# Patient Record
Sex: MALE | Race: WHITE | NOT HISPANIC OR LATINO | Employment: OTHER | URBAN - NONMETROPOLITAN AREA
[De-identification: names, ages, dates, MRNs, and addresses within clinical notes are randomized per-mention and may not be internally consistent; named-entity substitution may affect disease eponyms.]

---

## 2017-01-04 DIAGNOSIS — I10 BENIGN ESSENTIAL HYPERTENSION: Primary | ICD-10-CM

## 2017-01-05 RX ORDER — ATENOLOL 50 MG/1
TABLET ORAL
Qty: 90 TABLET | Refills: 2 | Status: SHIPPED | OUTPATIENT
Start: 2017-01-05 | End: 2017-07-03

## 2017-01-11 DIAGNOSIS — E78.5 HYPERLIPIDEMIA LDL GOAL <130: Primary | ICD-10-CM

## 2017-01-13 RX ORDER — PRAVASTATIN SODIUM 40 MG
TABLET ORAL
Qty: 90 TABLET | Refills: 2 | Status: SHIPPED | OUTPATIENT
Start: 2017-01-13 | End: 2017-05-12

## 2017-01-13 NOTE — TELEPHONE ENCOUNTER
Pravastatin Sodium 40mg tab     Last Written Prescription Date: 10-  Last Fill Quantity: 90, # refills: 0  Last Office Visit with [facility], P or [facility] prescribing provider: 10-   Next 5 appointments (look out 90 days)     Jan 31, 2017 11:00 AM   Return Visit with Radha Sales NP   [facility] [place] ([facility] [place] [facility])    [address] [place] [address]  [place] [address]   [phone]                   CHOL      140   10/11/2016  HDL       56   10/11/2016  LDL       61   10/11/2016  TRIG      113   10/11/2016  CHOLHDLRATIO      3.4   5/18/2015

## 2017-01-24 DIAGNOSIS — C50.911 PRIMARY BREAST MALIGNANCY, RIGHT (H): Primary | ICD-10-CM

## 2017-01-24 RX ORDER — TAMOXIFEN CITRATE 20 MG/1
20 TABLET ORAL DAILY
Qty: 90 TABLET | Refills: 3 | Status: SHIPPED | OUTPATIENT
Start: 2017-01-24 | End: 2017-02-09

## 2017-01-24 NOTE — PATIENT INSTRUCTIONS
Refill request for tamoxifen pt last seen in November, has appt to see you in Feb    Zara Hester RN

## 2017-02-01 DIAGNOSIS — K21.9 GASTROESOPHAGEAL REFLUX DISEASE, ESOPHAGITIS PRESENCE NOT SPECIFIED: Primary | ICD-10-CM

## 2017-02-01 NOTE — TELEPHONE ENCOUNTER
Omeprazole 20mg cap      Last Written Prescription Date: 10-  Last Fill Quantity: 90,  # refills: 0   Last Office Visit with G, P or [facility] prescribing provider: 10-                                         Next 5 appointments (look out 90 days)     Feb 07, 2017 11:30 AM   Return Visit with Radha Sales NP   [facility] ([facility])    [address] [place] [address] [place]  [place] [address]   [phone]

## 2017-02-03 ENCOUNTER — HOSPITAL ENCOUNTER (OUTPATIENT)
Dept: CT IMAGING | Facility: HOSPITAL | Age: 82
Discharge: HOME | End: 2017-02-03
Attending: NURSE PRACTITIONER | Admitting: NURSE PRACTITIONER
Payer: MEDICARE

## 2017-02-03 DIAGNOSIS — R73.9 ELEVATED BLOOD SUGAR: ICD-10-CM

## 2017-02-03 DIAGNOSIS — Z15.01 BRCA2 POSITIVE: ICD-10-CM

## 2017-02-03 DIAGNOSIS — Z15.09 BRCA2 POSITIVE: ICD-10-CM

## 2017-02-03 DIAGNOSIS — C50.911 PRIMARY BREAST MALIGNANCY, RIGHT (H): ICD-10-CM

## 2017-02-03 LAB
ALBUMIN SERPL-MCNC: 3.7 G/DL (ref 3.4–5)
ALP SERPL-CCNC: 57 U/L (ref 40–150)
ALT SERPL W P-5'-P-CCNC: 18 U/L (ref 0–70)
ANION GAP SERPL CALCULATED.3IONS-SCNC: 9 MMOL/L (ref 3–14)
AST SERPL W P-5'-P-CCNC: 16 U/L (ref 0–45)
BASOPHILS # BLD AUTO: 0.1 10E9/L (ref 0–0.2)
BASOPHILS NFR BLD AUTO: 0.8 %
BILIRUB SERPL-MCNC: 0.5 MG/DL (ref 0.2–1.3)
BUN SERPL-MCNC: 21 MG/DL (ref 7–30)
CALCIUM SERPL-MCNC: 8.7 MG/DL (ref 8.5–10.1)
CHLORIDE SERPL-SCNC: 105 MMOL/L (ref 94–109)
CO2 SERPL-SCNC: 26 MMOL/L (ref 20–32)
CREAT SERPL-MCNC: 1.15 MG/DL (ref 0.66–1.25)
DIFFERENTIAL METHOD BLD: ABNORMAL
EOSINOPHIL # BLD AUTO: 0.4 10E9/L (ref 0–0.7)
EOSINOPHIL NFR BLD AUTO: 6.6 %
ERYTHROCYTE [DISTWIDTH] IN BLOOD BY AUTOMATED COUNT: 15.2 % (ref 10–15)
EST. AVERAGE GLUCOSE BLD GHB EST-MCNC: 131 MG/DL
GFR SERPL CREATININE-BSD FRML MDRD: 61 ML/MIN/1.7M2
GLUCOSE SERPL-MCNC: 110 MG/DL (ref 70–99)
HBA1C MFR BLD: 6.2 % (ref 4.3–6)
HCT VFR BLD AUTO: 37 % (ref 40–53)
HGB BLD-MCNC: 12 G/DL (ref 13.3–17.7)
LDH SERPL L TO P-CCNC: 188 U/L (ref 85–227)
LYMPHOCYTES # BLD AUTO: 1.8 10E9/L (ref 0.8–5.3)
LYMPHOCYTES NFR BLD AUTO: 29.6 %
MCH RBC QN AUTO: 29.9 PG (ref 26.5–33)
MCHC RBC AUTO-ENTMCNC: 32.4 G/DL (ref 31.5–36.5)
MCV RBC AUTO: 92 FL (ref 78–100)
MONOCYTES # BLD AUTO: 0.5 10E9/L (ref 0–1.3)
MONOCYTES NFR BLD AUTO: 8.9 %
NEUTROPHILS # BLD AUTO: 3.2 10E9/L (ref 1.6–8.3)
NEUTROPHILS NFR BLD AUTO: 54.1 %
PLATELET # BLD AUTO: 223 10E9/L (ref 150–450)
POTASSIUM SERPL-SCNC: 4.7 MMOL/L (ref 3.4–5.3)
PROT SERPL-MCNC: 7.2 G/DL (ref 6.8–8.8)
PSA SERPL-ACNC: 1.81 UG/L (ref 0–4)
RBC # BLD AUTO: 4.01 10E12/L (ref 4.4–5.9)
SODIUM SERPL-SCNC: 140 MMOL/L (ref 133–144)
WBC # BLD AUTO: 5.9 10E9/L (ref 4–11)

## 2017-02-03 PROCEDURE — 83615 LACTATE (LD) (LDH) ENZYME: CPT | Performed by: NURSE PRACTITIONER

## 2017-02-03 PROCEDURE — 86300 IMMUNOASSAY TUMOR CA 15-3: CPT | Performed by: NURSE PRACTITIONER

## 2017-02-03 PROCEDURE — 83036 HEMOGLOBIN GLYCOSYLATED A1C: CPT | Performed by: NURSE PRACTITIONER

## 2017-02-03 PROCEDURE — 99000 SPECIMEN HANDLING OFFICE-LAB: CPT | Performed by: NURSE PRACTITIONER

## 2017-02-03 PROCEDURE — 36415 COLL VENOUS BLD VENIPUNCTURE: CPT | Performed by: NURSE PRACTITIONER

## 2017-02-03 PROCEDURE — 74177 CT ABD & PELVIS W/CONTRAST: CPT | Mod: TC

## 2017-02-03 PROCEDURE — G0103 PSA SCREENING: HCPCS | Performed by: NURSE PRACTITIONER

## 2017-02-03 PROCEDURE — 85025 COMPLETE CBC W/AUTO DIFF WBC: CPT | Performed by: NURSE PRACTITIONER

## 2017-02-03 PROCEDURE — 40000788 ZZHCL STATISTIC ESTIMATED AVERAGE GLUCOSE: Performed by: NURSE PRACTITIONER

## 2017-02-03 PROCEDURE — 80053 COMPREHEN METABOLIC PANEL: CPT | Performed by: NURSE PRACTITIONER

## 2017-02-03 RX ORDER — IOPAMIDOL 612 MG/ML
100 INJECTION, SOLUTION INTRAVASCULAR ONCE
Status: COMPLETED | OUTPATIENT
Start: 2017-02-03 | End: 2017-02-03

## 2017-02-03 RX ADMIN — DIATRIZOATE MEGLUMINE AND DIATRIZOATE SODIUM 30 ML: 660; 100 SOLUTION ORAL; RECTAL at 12:23

## 2017-02-03 RX ADMIN — IOPAMIDOL 100 ML: 612 INJECTION, SOLUTION INTRAVASCULAR at 12:23

## 2017-02-06 LAB — CANCER AG27-29 SERPL-ACNC: 59 U/ML

## 2017-02-09 ENCOUNTER — ONCOLOGY VISIT (OUTPATIENT)
Dept: ONCOLOGY | Facility: OTHER | Age: 82
End: 2017-02-09
Attending: NURSE PRACTITIONER
Payer: COMMERCIAL

## 2017-02-09 VITALS
HEART RATE: 74 BPM | OXYGEN SATURATION: 96 % | SYSTOLIC BLOOD PRESSURE: 126 MMHG | BODY MASS INDEX: 28.12 KG/M2 | WEIGHT: 175 LBS | TEMPERATURE: 98.2 F | RESPIRATION RATE: 14 BRPM | DIASTOLIC BLOOD PRESSURE: 72 MMHG | HEIGHT: 66 IN

## 2017-02-09 DIAGNOSIS — R06.2 WHEEZING: ICD-10-CM

## 2017-02-09 DIAGNOSIS — N64.4 BREAST PAIN: ICD-10-CM

## 2017-02-09 DIAGNOSIS — C50.911 PRIMARY BREAST MALIGNANCY, RIGHT (H): Primary | ICD-10-CM

## 2017-02-09 DIAGNOSIS — N40.0 PROSTATISM: ICD-10-CM

## 2017-02-09 PROCEDURE — 99212 OFFICE O/P EST SF 10 MIN: CPT

## 2017-02-09 PROCEDURE — 99214 OFFICE O/P EST MOD 30 MIN: CPT | Performed by: NURSE PRACTITIONER

## 2017-02-09 ASSESSMENT — PAIN SCALES - GENERAL: PAINLEVEL: NO PAIN (0)

## 2017-02-09 NOTE — PATIENT INSTRUCTIONS
We would like to see you back after mammo and CT are complete.. When you are in need of a refill, please call your pharmacy and they will send us a request. If you have any questions please call [phone]

## 2017-02-09 NOTE — PROGRESS NOTES
Oncology Follow-up Visit:  February 9, 2017    Reason for Visit:  Patient presents with:  RECHECK: 3 month f/u     Nursing Note and documentation reviewed: yes    HPI:  This is an 82-year-old male patient who presents to the oncology/hematology clinic today in follow up of right breast cancer.  Patient underwent a right mastectomy with axillary node dissection for breast cancer in July 2015.  He was placed on 20 mg of tamoxifen.  He continues on this daily and is tolerating well.  He is feeling good.  He is missing his wife.  He thinks the lymphedema in his RUE is improved some and continues to do his own therapy on it.  He has no issues with hot flashes or muscle pains.  He has no new visual changes or headaches or bone pain.  He does complain of some discomfort to the left breast that is new over the past month or so.  No lumps or nipple discharge.    Oncologic History: Patient had presented with complaints of right nipple irritation over a six-month period and was noted to have a mass in the right breast. Mammogram was completed followed by biopsy and pathology showed an invasive carcinoma. On 7/27/2015, he underwent a right modified radical mastectomy with axillary node dissection by Dr. Garsia. 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 DCIS was present, grade 2 with expansive comedonecrosis. Node dissection revealed 1/17 nodes revealing micrometastasis. Tumor was ER/OH positive, HER-2/thomas negative. Patient was staged pathologic T1c N1mi M0.    At consultation, patient was not interested in adjuvant chemotherapy and was placed on tamoxifen 20 mg daily.    Current Chemo Regime/TX: Tamoxifen 20mg daily  Current Cycle: n/a  # of completed cycles: n/a    Previous treatment:  n/a    Past Medical History   Diagnosis Date     Shortness of breath 03/28/2006     Dysphagia 06/28/2004     Need for  prophylactic vaccination and inoculation 12/10/2001     Other and unspecified hyperlipidemia 05/01/2001     Unspecified essential hypertension 05/18/2000     Shoulder pain 06/15/2012     Need for prophylactic vaccination and inoculation, 11/06/2003     Prediabetes      HTN (hypertension)        Past Surgical History   Procedure Laterality Date     Orthopedic surgery       bilateral knee replacements     Cardiac surgery       angiogram 1992 [facility]     Colonoscopy       1998     Gi surgery       EGD     Gi surgery       Pt has EGD with removal of food from esophagus     Genitourinary surgery       prostate bx [facility]     Genitourinary surgery       Prostate bx [facility]     Mastectomy simple, sentinel node, combined Right 7/27/2015     Procedure: COMBINED MASTECTOMY SIMPLE, SENTINEL NODE;  Surgeon: Aliyah Garsia MD;  Location: [facility] OR       Family History   Problem Relation Age of Onset     Cancer - colorectal Mother      Eye Disorder Mother      cataracts     Musculoskeletal Disorder Mother      CTS     CANCER Father      lung cancer     Genitourinary Problems Father      prostate problems     C.A.D. Brother      CABG     Eye Disorder Brother      cataracts       Social History     Social History     Marital Status:      Spouse Name: Marisa     Number of Children: N/A     Years of Education: N/A     Occupational History     Not on file.     Social History Main Topics     Smoking status: Former Smoker     Types: Cigarettes     Smokeless tobacco: Never Used      Comment: tried to quit-yes     Alcohol Use: No      Comment: former     Drug Use: No     Sexual Activity: Not on file     Other Topics Concern     Caffeine Concern Yes     coffee, 2 cups daily     Parent/Sibling W/ Cabg, Mi Or Angioplasty Before 65f 55m? No     Social History Narrative       Current Outpatient Prescriptions   Medication     omeprazole (PRILOSEC) 20 MG CR capsule     pravastatin (PRAVACHOL) 40 MG tablet     atenolol (TENORMIN) 50 MG  "tablet     levothyroxine (SYNTHROID/LEVOTHROID) 75 MCG tablet     ALPRAZolam (XANAX) 0.5 MG tablet     tamoxifen (NOLVADEX) 20 MG tablet     tamsulosin (FLOMAX) 0.4 MG 24 hr capsule     triamcinolone (KENALOG) 0.5 % cream     sennosides (SENOKOT) 8.6 MG tablet     psyllium (METAMUCIL) 58.6 % POWD     Ascorbic Acid (VITAMIN C PO)     VENTOLIN  (90 BASE) MCG/ACT inhaler     [DISCONTINUED] tamoxifen (NOLVADEX) 20 MG tablet     No current facility-administered medications for this visit.        No Known Allergies    Review Of Systems:  Constitutional: denies fever, weight changes, chills, and night sweats.  Eyes: denies blurred or double vision  Ears/Nose/Throat: denies ear pain, nose problems, difficulty swallowing  Respiratory: denies shortness of breath, cough   Skin: denies rash, lesions  Breast/Chest wall: see HPI  Cardiovascular: denies chest pain, palpitations, edema  Gastrointestinal: denies abdominal pain, bloating, nausea, vomiting, early satiety  Genitourinary: denies difficulty with urination, blood in urine  Musculoskeletal:denies new muscle pain, bone pain  Neurologic: denies lightheadedness, headaches, numbness or tingling  Psychiatric: denies anxiety, depression  Hematologic/Lymphatic/Immunologic: denies easy bruising, easy bleeding, lumps or bumps noted  Endocrine: Denies increased thirst    Physical Exam:  /72 mmHg  Pulse 74  Temp(Src) 98.2  F (36.8  C) (Oral)  Resp 14  Ht 1.676 m (5' 6\")  Wt 79.379 kg (175 lb)  BMI 28.26 kg/m2  SpO2 96%    GENERAL APPEARANCE: Healthy, alert and in no acute distress.  HEENT: Normocephalic, Sclerae anicteric. Oropharynx without ulcers, lesions, or thrush.  NECK:  No asymmetry or masses, no thyromegaly.  LYMPHATICS: No palpable cervical, supraclavicular, axillary, or inguinal nodes   RESP:  Expiratory wheezes t/o, respirations regular and easy  CARDIOVASCULAR: Regular rate and rhythm. Normal S1, S2  ABDOMEN: Soft, nontender. Bowel sounds auscultated " all 4 quadrants. No palpable organomegaly or masses.  BREAST/Chest wall: no lumps, masses; pain with palpation to the lateral portoin of the left breast  MUSCULOSKELETAL: Extremities without gross deformities noted. No edema of bilateral lower extremities.  Edema remains to RUE.  SKIN: No suspicious lesions or rashes.  NEURO: Alert and oriented x 3.  Gait steady.  PSYCHIATRIC: Mentation and affect appear normal.  Mood appropriate.    Laboratory:  Results for orders placed or performed in visit on 02/03/17   CBC with platelets differential   Result Value Ref Range    WBC 5.9 4.0 - 11.0 10e9/L    RBC Count 4.01 (L) 4.4 - 5.9 10e12/L    Hemoglobin 12.0 (L) 13.3 - 17.7 g/dL    Hematocrit 37.0 (L) 40.0 - 53.0 %    MCV 92 78 - 100 fl    MCH 29.9 26.5 - 33.0 pg    MCHC 32.4 31.5 - 36.5 g/dL    RDW 15.2 (H) 10.0 - 15.0 %    Platelet Count 223 150 - 450 10e9/L    Diff Method Automated Method     % Neutrophils 54.1 %    % Lymphocytes 29.6 %    % Monocytes 8.9 %    % Eosinophils 6.6 %    % Basophils 0.8 %    Absolute Neutrophil 3.2 1.6 - 8.3 10e9/L    Absolute Lymphocytes 1.8 0.8 - 5.3 10e9/L    Absolute Monocytes 0.5 0.0 - 1.3 10e9/L    Absolute Eosinophils 0.4 0.0 - 0.7 10e9/L    Absolute Basophils 0.1 0.0 - 0.2 10e9/L   Comprehensive metabolic panel   Result Value Ref Range    Sodium 140 133 - 144 mmol/L    Potassium 4.7 3.4 - 5.3 mmol/L    Chloride 105 94 - 109 mmol/L    Carbon Dioxide 26 20 - 32 mmol/L    Anion Gap 9 3 - 14 mmol/L    Glucose 110 (H) 70 - 99 mg/dL    Urea Nitrogen 21 7 - 30 mg/dL    Creatinine 1.15 0.66 - 1.25 mg/dL    GFR Estimate 61 >60 mL/min/1.7m2    GFR Estimate If Black 74 >60 mL/min/1.7m2    Calcium 8.7 8.5 - 10.1 mg/dL    Bilirubin Total 0.5 0.2 - 1.3 mg/dL    Albumin 3.7 3.4 - 5.0 g/dL    Protein Total 7.2 6.8 - 8.8 g/dL    Alkaline Phosphatase 57 40 - 150 U/L    ALT 18 0 - 70 U/L    AST 16 0 - 45 U/L   Ca27.29  breast tumor marker   Result Value Ref Range    CA 27-29 59 U/mL   Lactate  Dehydrogenase   Result Value Ref Range    Lactate Dehydrogenase 188 85 - 227 U/L   Prostate spec antigen screen   Result Value Ref Range    PSA 1.81 0 - 4 ug/L   Hemoglobin A1c   Result Value Ref Range    Hemoglobin A1C 6.2 (H) 4.3 - 6.0 %   Estimated Average Glucose   Result Value Ref Range    Estimated Average Glucose 131 mg/dL       Imaging Studies:      Results for orders placed or performed in visit on 11/16/16   CT Abdomen Pelvis w Contrast    Narrative    ABDOMEN AND PELVIS CT    HISTORY:  Male breast cancer, evaluate for metastases.    FINDINGS:  There is some calcific pleural plaquing seen in both lungs.  There is interstitial thickening seen bilaterally.  The liver is free  of masses or biliary duct enlargement.  No gallstones are seen.  The  spleen and pancreas appear normal.  There are no adrenal masses.  Right and left kidneys show no evidence of mass or hydronephrosis.  Periaortic lymph nodes are normal in caliber.  In the pelvis, the  bladder and rectum appear normal.  The prostate is enlarged.  Sigmoid  diverticulosis is noted.  Severe degenerative changes are present in  the lumbar spine.    IMPRESSION:  NO EVIDENCE OF METASTATIC DISEASE FROM THE PATIENT'S  BREAST CANCER.  Exam Date: Feb 03, 2017 12:48:00 AM  Author: FLORENTIN PRATHER  This report is final and signed         ASSESSMENT/PLAN:    #1  Breast cancer:  Diagnosed with Pathologic T1c N1mi M0 carcinoma of the right breast in July 2015.  He underwent mastectomy with node dissection with 1/17 nodes revealing micrometastases; tumor was 2cm, ER/SC positive, HER-2/thomas negative.  He'll continue with tamoxifen 20 mg daily.  Will plan routine follow up once results are received for the CT chest and mammogram/US.    #2  Breast pain:  Will obtain a left mammogram and ultrasound and follow up with the results.    #3  Wheezing:  New.  CT chest was completed last October.  Will obtain a repeat CT chest and follow up with the results.    #4  BPH:  PSA  checked related to BRCA2 status and normal.  CT shows enlarged prostate.  On flomax through his PCP.    #5  Elevated blood sugar: A1C 6.2.  Will monitor in follow ups.    I encouraged patient to call with any questions or concerns.      Radha Sales  FNP-BC

## 2017-02-09 NOTE — NURSING NOTE
"Chief Complaint   Patient presents with     RECHECK     3 month f/u       Initial /72 mmHg  Pulse 74  Temp(Src) 98.2  F (36.8  C) (Oral)  Resp 14  Ht 1.676 m (5' 6\")  Wt 79.379 kg (175 lb)  BMI 28.26 kg/m2  SpO2 96% Estimated body mass index is 28.26 kg/(m^2) as calculated from the following:    Height as of this encounter: 1.676 m (5' 6\").    Weight as of this encounter: 79.379 kg (175 lb).  Medication Reconciliation: complete     Patient was assessed using the NCCN psychosocial distress thermometer. Patient rated the score as a 0. Patient rated current stressors as none. Stressors will be brought to the attention of provider or Oncology RN Care Coordinator for a score of 6 or greater or per nurses discretion.       Zara Hester RN          "

## 2017-02-09 NOTE — MR AVS SNAPSHOT
After Visit Summary   2/9/2017    Lio Daly    MRN: [id number]           Patient Information     Date Of Birth          5/28/1934        Visit Information        Provider Department      2/9/2017 1:30 PM Radha Sales NP [facility]        Today's Diagnoses     Primary breast malignancy, right (H)    -  1     Wheezing         Breast pain           Care Instructions    We would like to see you back after mammo and CT are complete.. When you are in need of a refill, please call your pharmacy and they will send us a request. If you have any questions please call [phone]        Follow-ups after your visit        Your next 10 appointments already scheduled     Feb 15, 2017 12:30 PM   Radiology with [facility] CT SCAN   [facility] CT Scan ([facility] )    [address] [id number] [phone]            Feb 15, 2017  1:30 PM   MAMMOGRAM with  MAMMOGRAM [facility])    [address] [facility] [address]   [phone]           Do not wear any body powder, lotions, deodorant or perfume the day of the exam. Bring a list of all medications, especially hormones.  If your last mammogram was not done at [place] [facility], please bring your mammogram films. We will need the name of your MD/PA to send a copy of your report.            Feb 15, 2017  2:00 PM   Radiology with [facility] UNTRASOUND 1   [facility] Ultrasound ([facility] )    [address] [place] [address]   [phone]            Feb 17, 2017  1:30 PM   Return Visit with Radha Sales NP   [facility])    [address] [facility] [address] [phone]              Who to contact     If you have questions or need follow up information about today's clinic visit or your schedule please contact [facility] directly at [phone].  Normal or non-critical lab and imaging results will be communicated to you  "by [employer]art, letter or phone within 4 business days after the clinic has received the results. If you do not hear from us within 7 days, please contact the clinic through Ge[employer] or phone. If you have a critical or abnormal lab result, we will notify you by phone as soon as possible.  Submit refill requests through [email or web address] or call your pharmacy and they will forward the refill request to us. Please allow 3 business days for your refill to be completed.          Additional Information About Your Visit        [facility] [facility]am[facility] HospitalTeleFlip Information     [email or web address] lets you send messages to your doctor, view your test results, renew your prescriptions, schedule appointments and more. To sign up, go to [email or web address] . Click on \"Log in\" on the left side of the screen, which will take you to the Welcome page. Then click on \"Sign up Now\" on the right side of the page.     You will be asked to enter the access code listed below, as well as some personal information. Please follow the directions to create your username and password.     Your access code is: [email or web address]  Expires: 5/10/2017  2:27 PM     Your access code will  in 90 days. If you need help or a new code, please call your [facility] clinic or [phone].        Care EveryWhere ID     This is your Care EveryWhere ID. This could be used by other organizations to access your [facility] medical records  [id number]        Your Vitals Were     Pulse Temperature Respirations Height BMI (Body Mass Index) Pulse Oximetry    74 98.2  F (36.8  C) (Oral) 14 1.676 m (5' 6\") 28.26 kg/m2 96%       Blood Pressure from Last 3 Encounters:   17 126/72   16 146/88   10/11/16 136/66    Weight from Last 3 Encounters:   17 79.379 kg (175 lb)   16 82.827 kg (182 lb 9.6 oz)   10/11/16 83.915 kg (185 lb)              We Performed the Following     CT Chest w Contrast     MA Diagnostic Left w/[place]     US Breast Left Limited 1-3 Quadrants        Primary Care Provider Office " Phone # Fax #    Rubén Wren [phone]       [facility] [address]  [facility] [address]        Thank you!     Thank you for [facility]  for your care. Our goal is always to provide you with excellent care. Hearing back from our patients is one way we can continue to improve our services. Please take a few minutes to complete the written survey that you may receive in the mail after your visit with us. Thank you!             Your Updated Medication List - Protect others around you: Learn how to safely use, store and throw away your medicines at www.disposemymeds.org.          This list is accurate as of: 2/9/17  2:46 PM.  Always use your most recent med list.                   Brand Name Dispense Instructions for use    ALPRAZolam 0.5 MG tablet    XANAX    60 tablet    TAKE 1 OR 2 TABLETS BY MOUTH AT BEDTIME AS NEEDED       atenolol 50 MG tablet    TENORMIN    90 tablet    TAKE 1 TABLET BY MOUTH DAILY       levothyroxine 75 MCG tablet    SYNTHROID/LEVOTHROID    30 tablet    TAKE 1 TABLET BY MOUTH DAILY       omeprazole 20 MG CR capsule    priLOSEC    90 capsule    TAKE 1 CAPSULE BY MOUTH DAILY       pravastatin 40 MG tablet    PRAVACHOL    90 tablet    TAKE 1 TABLET BY MOUTH EVERY MORNING AND 1 EVERY OTHER EVENING       psyllium 58.6 % Powd    METAMUCIL     Take 1 teaspoonful by mouth daily       sennosides 8.6 MG tablet    SENOKOT     Take 2 tablets by mouth At Bedtime       tamoxifen 20 MG tablet    NOLVADEX    90 tablet    Take 1 tablet (20 mg) by mouth daily       tamsulosin 0.4 MG capsule    FLOMAX    90 capsule    TAKE 1 CAPSULE BY MOUTH DAILY       triamcinolone 0.5 % cream    KENALOG    30 g    Apply sparingly to affected area three times daily.       VENTOLIN  (90 BASE) MCG/ACT Inhaler   Generic drug:  albuterol     18 each    USE 2 PUFFS EVERY 6 HOURS AS NEEDED FOR SHORTNESS OF BREATH/DYSPNEA       VITAMIN C PO      Take 500 mg by mouth  daily

## 2017-02-14 DIAGNOSIS — N64.4 PAIN OF LEFT BREAST: Primary | ICD-10-CM

## 2017-02-14 DIAGNOSIS — C50.921 MALIGNANT NEOPLASM OF RIGHT MALE BREAST, UNSPECIFIED SITE OF BREAST: ICD-10-CM

## 2017-02-15 ENCOUNTER — HOSPITAL ENCOUNTER (OUTPATIENT)
Dept: CT IMAGING | Facility: HOSPITAL | Age: 82
Discharge: HOME | End: 2017-02-15
Attending: NURSE PRACTITIONER | Admitting: NURSE PRACTITIONER
Payer: MEDICARE

## 2017-02-15 ENCOUNTER — HOSPITAL ENCOUNTER (OUTPATIENT)
Dept: ULTRASOUND IMAGING | Facility: HOSPITAL | Age: 82
End: 2017-02-15
Attending: NURSE PRACTITIONER
Payer: MEDICARE

## 2017-02-15 PROCEDURE — G0206 DX MAMMO INCL CAD UNI: HCPCS | Mod: TC,LT | Performed by: RADIOLOGY

## 2017-02-15 PROCEDURE — 76642 ULTRASOUND BREAST LIMITED: CPT | Mod: TC,LT

## 2017-02-15 PROCEDURE — 71260 CT THORAX DX C+: CPT | Mod: TC

## 2017-02-15 PROCEDURE — 77061 BREAST TOMOSYNTHESIS UNI: CPT | Mod: TC,LT | Performed by: RADIOLOGY

## 2017-02-15 RX ORDER — IOPAMIDOL 612 MG/ML
100 INJECTION, SOLUTION INTRAVASCULAR ONCE
Status: COMPLETED | OUTPATIENT
Start: 2017-02-15 | End: 2017-02-15

## 2017-02-15 RX ADMIN — IOPAMIDOL 100 ML: 612 INJECTION, SOLUTION INTRAVASCULAR at 13:04

## 2017-02-17 ENCOUNTER — ONCOLOGY VISIT (OUTPATIENT)
Dept: ONCOLOGY | Facility: OTHER | Age: 82
End: 2017-02-17
Attending: NURSE PRACTITIONER
Payer: COMMERCIAL

## 2017-02-17 VITALS
SYSTOLIC BLOOD PRESSURE: 136 MMHG | WEIGHT: 181 LBS | TEMPERATURE: 98.3 F | OXYGEN SATURATION: 97 % | RESPIRATION RATE: 18 BRPM | HEART RATE: 73 BPM | DIASTOLIC BLOOD PRESSURE: 81 MMHG | BODY MASS INDEX: 27.43 KG/M2 | HEIGHT: 68 IN

## 2017-02-17 DIAGNOSIS — J32.9 SINUSITIS, UNSPECIFIED CHRONICITY, UNSPECIFIED LOCATION: ICD-10-CM

## 2017-02-17 DIAGNOSIS — C50.921 MALIGNANT NEOPLASM OF RIGHT MALE BREAST, UNSPECIFIED SITE OF BREAST: Primary | ICD-10-CM

## 2017-02-17 PROCEDURE — 99213 OFFICE O/P EST LOW 20 MIN: CPT | Performed by: NURSE PRACTITIONER

## 2017-02-17 PROCEDURE — 99212 OFFICE O/P EST SF 10 MIN: CPT

## 2017-02-17 ASSESSMENT — PAIN SCALES - GENERAL: PAINLEVEL: NO PAIN (0)

## 2017-02-17 NOTE — NURSING NOTE
"Chief Complaint   Patient presents with     RECHECK     Follow up breast/imaging results.       Cold Symptoms     Patient has had a cough and sinus issues for about one week.  No fever and appetite and liquid intake okay       Initial /81 (BP Location: Left arm, Patient Position: Chair, Cuff Size: Adult Regular)  Pulse 73  Temp 98.3  F (36.8  C) (Tympanic)  Resp 18  Ht 1.727 m (5' 8\")  Wt 82.1 kg (181 lb)  SpO2 97%  BMI 27.52 kg/m2 Estimated body mass index is 27.52 kg/(m^2) as calculated from the following:    Height as of this encounter: 1.727 m (5' 8\").    Weight as of this encounter: 82.1 kg (181 lb).  Medication Reconciliation: complete     Gi Lazo    Patient was assessed using the NCCN psychosocial distress thermometer. Patient rated the score as a 0. Patient rated current stressors as n/a. Stressors will be brought to the attention of provider or Oncology RN Care Coordinator for a score of 6 or greater or per nurses discretion.   Gi Lazo      "

## 2017-02-17 NOTE — MR AVS SNAPSHOT
After Visit Summary   2/17/2017    Lio Daly    MRN: [id number]           Patient Information     Date Of Birth          5/28/1934        Visit Information        Provider Department      2/17/2017 1:30 PM Radha Sales NP [address]        Today's Diagnoses     Sinusitis, unspecified chronicity, unspecified location    -  1      Care Instructions    We would like to see you back in 3 months. Please come 1week(s) prior for lab work.  When you are in need of a refill of your medications, please call your pharmacy and they will send us the request. If you have any questions please call [phone]          Follow-ups after your visit        Your next 10 appointments already scheduled     May 10, 2017  1:00 PM CDT   LAB with NA LAB   [facility] ([facility])    [address]  [facility] [address] [phone]            May 17, 2017  1:00 PM CDT   Return Visit with Radha Sales NP   [facility] ([facility])    [address] [place] [address] Ave  [place] [id number] [phone]              Who to contact     If you have questions or need follow up information about today's clinic visit or your schedule please contact [facility] directly at [phone].  Normal or non-critical lab and imaging results will be communicated to you by MyChart, letter or phone within 4 business days after the clinic has received the results. If you do not hear from us within 7 days, please contact the clinic through MyChart or phone. If you have a critical or abnormal lab result, we will notify you by phone as soon as possible.  Submit refill requests through [employer] or call your pharmacy and they will forward the refill request to us. Please allow 3 business days for your refill to be completed.          Additional Information About Your Visit        [employer] Information     [employer] lets you send messages to your doctor, view your test  "results, renew your prescriptions, schedule appointments and more. To sign up, go to [email or web address] . Click on \"Log in\" on the left side of the screen, which will take you to the Welcome page. Then click on \"Sign up Now\" on the right side of the page.     You will be asked to enter the access code listed below, as well as some personal information. Please follow the directions to create your username and password.     Your access code is: [email or web address]  Expires: 5/10/2017  2:27 PM     Your access code will  in 90 days. If you need help or a new code, please call your [facility] clinic or [phone].        Care EveryWhere ID     This is your Care EveryWhere ID. This could be used by other organizations to access your [facility] medical records  [id number]        Your Vitals Were     Pulse Temperature Respirations Height Pulse Oximetry BMI (Body Mass Index)    73 98.3  F (36.8  C) (Tympanic) 18 1.727 m (5' 8\") 97% 27.52 kg/m2       Blood Pressure from Last 3 Encounters:   17 136/81   17 126/72   16 146/88    Weight from Last 3 Encounters:   17 82.1 kg (181 lb)   17 79.4 kg (175 lb)   16 82.8 kg (182 lb 9.6 oz)              Today, you had the following     No orders found for display         Today's Medication Changes          These changes are accurate as of: 17  2:09 PM.  If you have any questions, ask your nurse or doctor.               Start taking these medicines.        Dose/Directions    amoxicillin-clavulanate 875-125 MG per tablet   Commonly known as:  AUGMENTIN   Used for:  Sinusitis, unspecified chronicity, unspecified location   Started by:  Radha Sales NP        Dose:  1 tablet   Take 1 tablet by mouth 2 times daily   Quantity:  20 tablet   Refills:  0            Where to get your medicines      These medications were sent to [facility] - [address]  [address] [address]     Phone:  " [phone]     amoxicillin-clavulanate 875-125 MG per tablet                Primary Care Provider Office Phone # Fax #    Rubén Wren [phone]       [facility] [address] [facility] [address]        Thank you!     Thank you for choosing [facility]  [facility] your [facility]. Our goal is always to provide you with excellent care. Hearing back from our patients is one way we can continue to improve our services. Please take a few minutes to complete the written survey that you may receive in the mail after your visit with us. Thank you!             Your Updated Medication List - Protect others around you: Learn how to safely use, store and throw away your medicines at www.disposemymeds.org.          This list is accurate as of: 2/17/17  2:09 PM.  Always use your most recent med list.                   Brand Name Dispense Instructions for use    ALPRAZolam 0.5 MG tablet    XANAX    60 tablet    TAKE 1 OR 2 TABLETS BY MOUTH AT BEDTIME AS NEEDED       amoxicillin-clavulanate 875-125 MG per tablet    AUGMENTIN    20 tablet    Take 1 tablet by mouth 2 times daily       atenolol 50 MG tablet    TENORMIN    90 tablet    TAKE 1 TABLET BY MOUTH DAILY       levothyroxine 75 MCG tablet    SYNTHROID/LEVOTHROID    30 tablet    TAKE 1 TABLET BY MOUTH DAILY       omeprazole 20 MG CR capsule    priLOSEC    90 capsule    TAKE 1 CAPSULE BY MOUTH DAILY       pravastatin 40 MG tablet    PRAVACHOL    90 tablet    TAKE 1 TABLET BY MOUTH EVERY MORNING AND 1 EVERY OTHER EVENING       psyllium 58.6 % Powd    METAMUCIL     Take 1 teaspoonful by mouth daily       sennosides 8.6 MG tablet    SENOKOT     Take 2 tablets by mouth At Bedtime       tamoxifen 20 MG tablet    NOLVADEX    90 tablet    Take 1 tablet (20 mg) by mouth daily       tamsulosin 0.4 MG capsule    FLOMAX    90 capsule    TAKE 1 CAPSULE BY MOUTH DAILY       triamcinolone 0.5 % cream    KENALOG    30 g    Apply sparingly to affected  area three times daily.       VENTOLIN  (90 BASE) MCG/ACT Inhaler   Generic drug:  albuterol     18 each    USE 2 PUFFS EVERY 6 HOURS AS NEEDED FOR SHORTNESS OF BREATH/DYSPNEA       VITAMIN C PO      Take 500 mg by mouth daily

## 2017-02-17 NOTE — PROGRESS NOTES
Oncology Visit:  February 17, 2017    Reason for Visit:  Patient presents with:  RECHECK: Follow up breast/imaging results.    Cold Symptoms: Patient has had a cough and sinus issues for about one week.  No fever and appetite and liquid intake okay     Nursing Note and documentation reviewed: yes    HPI:  This is an 82-year-old male patient who presents to the oncology/hematology clinic today for results of recent imaging.  He was seen in follow for right breast cancer on 2/9/17.  Patient underwent a right mastectomy with axillary node dissection in July 2015.  He had complaints of left breast pain and also wheezing.  Mammogram and ultrasound along with CT chest was done last week. He has complaints today of sinus congestion and is coughing some.  He states this started after he was here on 2/9/17 and doesn't seem to be getting better.  He is using a nebulizer once a day.  He has no SOB and no fevers.  He has no other new complaints since our visit on 2/9.    Oncologic History: Patient had presented with complaints of right nipple irritation over a six-month period and was noted to have a mass in the right breast. Mammogram was completed followed by biopsy and pathology showed an invasive carcinoma. On 7/27/2015, he underwent a right modified radical mastectomy with axillary node dissection by Dr. Garsia. 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 DCIS was present, grade 2 with expansive comedonecrosis. Node dissection revealed 1/17 nodes revealing micrometastasis. Tumor was ER/IL positive, HER-2/thomas negative. Patient was staged pathologic T1c N1mi M0.     At consultation, patient was not interested in adjuvant chemotherapy and was placed on tamoxifen 20 mg daily.     Current Chemo Regime/TX: Tamoxifen 20mg daily  Current Cycle: n/a  # of completed cycles: n/a     Previous treatment:  n/a    Past  Medical History   Diagnosis Date     Dysphagia 06/28/2004     HTN (hypertension)      Need for prophylactic vaccination and inoculation 12/10/2001     Need for prophylactic vaccination and inoculation, 11/06/2003     Other and unspecified hyperlipidemia 05/01/2001     Prediabetes      Shortness of breath 03/28/2006     Shoulder pain 06/15/2012     Unspecified essential hypertension 05/18/2000       Past Surgical History   Procedure Laterality Date     Orthopedic surgery       bilateral knee replacements     Cardiac surgery       angiogram 1992 [facility]     Colonoscopy       1998     Gi surgery       EGD     Gi surgery       Pt has EGD with removal of food from esophagus     Genitourinary surgery       prostate bx [place]     Genitourinary surgery       Prostate bx [facility]     Mastectomy simple, sentinel node, combined Right 7/27/2015     Procedure: COMBINED MASTECTOMY SIMPLE, SENTINEL NODE;  Surgeon: Aliyah Garsia MD;  Location: [facility] OR       Family History   Problem Relation Age of Onset     Cancer - colorectal Mother      Eye Disorder Mother      cataracts     Musculoskeletal Disorder Mother      CTS     CANCER Father      lung cancer     Genitourinary Problems Father      prostate problems     C.A.D. Brother      CABG     Eye Disorder Brother      cataracts       Social History     Social History     Marital status:      Spouse name: Marisa     Number of children: N/A     Years of education: N/A     Occupational History     Not on file.     Social History Main Topics     Smoking status: Former Smoker     Types: Cigarettes     Smokeless tobacco: Never Used      Comment: tried to quit-yes     Alcohol use No      Comment: former     Drug use: No     Sexual activity: Not on file     Other Topics Concern     Caffeine Concern Yes     coffee, 2 cups daily     Parent/Sibling W/ Cabg, Mi Or Angioplasty Before 65f 55m? No     Social History Narrative       Current Outpatient Prescriptions   Medication      "omeprazole (PRILOSEC) 20 MG CR capsule     pravastatin (PRAVACHOL) 40 MG tablet     atenolol (TENORMIN) 50 MG tablet     levothyroxine (SYNTHROID/LEVOTHROID) 75 MCG tablet     ALPRAZolam (XANAX) 0.5 MG tablet     tamoxifen (NOLVADEX) 20 MG tablet     tamsulosin (FLOMAX) 0.4 MG 24 hr capsule     sennosides (SENOKOT) 8.6 MG tablet     psyllium (METAMUCIL) 58.6 % POWD     Ascorbic Acid (VITAMIN C PO)     triamcinolone (KENALOG) 0.5 % cream     VENTOLIN  (90 BASE) MCG/ACT inhaler     No current facility-administered medications for this visit.         No Known Allergies      Physical Exam:  /81 (BP Location: Left arm, Patient Position: Chair, Cuff Size: Adult Regular)  Pulse 73  Temp 98.3  F (36.8  C) (Tympanic)  Resp 18  Ht 1.727 m (5' 8\")  Wt 82.1 kg (181 lb)  SpO2 97%  BMI 27.52 kg/m2    GENERAL APPEARANCE: Healthy, alert and in no acute distress.  HEENT: Normocephalic, Sclerae anicteric. Oropharynx without ulcers, lesions, or thrush.  NECK:  No asymmetry or masses, no thyromegaly.  LYMPHATICS: No palpable cervical, supraclavicular, axillary, or inguinal nodes   RESP: Lungs with expiratory wheezes bilaterally, respirations regular and easy  CARDIOVASCULAR: Regular rate and rhythm. Normal S1, S2; no murmur, gallop, or rub.  NEURO: Alert and oriented x 3.  Gait steady.  PSYCHIATRIC: Mentation and affect appear normal.  Mood appropriate.    Laboratory:  None for today      Imaging Studies:      DIAGNOSTIC LEFT MAMMOGRAM WITH TOMOSYNTHESIS     REPORT: The patient has gynecomastia. No left breast masses or  malignant calcifications are noted.     IMPRESSION: GYNECOMASTIA. NO MASSES SUSPICIOUS FOR MALIGNANCY ARE  SEEN.     BI-RADS CATEGORY: 2 - Benign finding.     BREAST DENSITY: B - There are scattered areas of fibroglandular  density.     The examination was reviewed with R2 CAD.  Exam Date: No Exam date!  Author: FLORENTIN PRATHER  This report is final and null    Results for orders placed or performed during " the hospital encounter of 02/15/17   US Breast Left    Narrative    LEFT BREAST ULTRASOUND    FINDINGS:  No breast masses are seen by ultrasound at the 3 o'clock  position, 2 to 3 cm from the nipple.    BI-RADS CATEGORY:  1 - Negative.  Exam Date: Feb 15, 2017 01:55:00 PM  Author: FLORENTIN PRATHER  This report is final and signed         CT SCAN OF CHEST WITH IV CONTRAST     REPORT: There is calcific pleural plaques in both hemithoraces, as  well as along the right hemidiaphragm. There is some interstitial  thickening seen peripherally in both lungs. No pulmonary masses or  infiltrates are seen. There is no hilar or mediastinal masses or  lymphadenopathy. Axillary and supraclavicular lymph nodes appear  normal. Severe arthritic changes are present in both shoulders.     IMPRESSION: CALCIFIC PLEURAL PLAQUING BILATERALLY. INTERSTITIAL  THICKENING SEEN IN THE LUNGS.  Exam Date: Feb 15, 2017 01:00:42 PM  Author: FLORENTIN PRATHER  This report is final and signed       ASSESSMENT/PLAN:    #1  Breast cancer:  Diagnosed with Pathologic T1c N1mi M0 carcinoma of the right breast in July 2015.  He underwent mastectomy with node dissection with 1/17 nodes revealing micrometastases; tumor was 2cm, ER/NV positive, HER-2/thomas negative.  He'll continue with tamoxifen 20 mg daily.  He will follow up in 3 months with CBC, CMP, LDH and CA 27.29.    #2  Sinusitis:  Will treat with Augmentin 75/125mg BID for 10 days.  I recommended follow up with his PCP with no improvement or worsening.    I encouraged patient to call with any questions or concerns.    Radha Sales  FNP-BC

## 2017-02-17 NOTE — PATIENT INSTRUCTIONS
We would like to see you back in 3 months. Please come 1week(s) prior for lab work.  When you are in need of a refill of your medications, please call your pharmacy and they will send us the request. If you have any questions please call [phone]

## 2017-02-18 PROBLEM — C50.921: Status: ACTIVE | Noted: 2017-02-18

## 2017-04-18 DIAGNOSIS — F41.9 ANXIETY: ICD-10-CM

## 2017-04-19 NOTE — TELEPHONE ENCOUNTER
Xanax      Last Written Prescription Date: 11/2/16  Last Fill Quantity: 60,  # refills: 5   Last Office Visit with G, [facility] or [facility] prescribing provider: 10/11/16                                         Next 5 appointments (look out 90 days)     May 10, 2017  1:30 PM CDT   (Arrive by 1:15 PM)   SHORT with Rubén Wren MD   [facility] ([facility])    [address]  [facility] [address]   [phone]            May 17, 2017  1:00 PM CDT   Return Visit with Radha Sales NP   [facility] [place] ([facility] [place] [facility])    [address]  [place] [address]   [phone]

## 2017-04-20 RX ORDER — ALPRAZOLAM 0.5 MG
TABLET ORAL
Qty: 60 TABLET | Refills: 0 | Status: SHIPPED | OUTPATIENT
Start: 2017-04-20 | End: 2017-05-20

## 2017-05-10 ENCOUNTER — OFFICE VISIT (OUTPATIENT)
Dept: FAMILY MEDICINE | Facility: OTHER | Age: 82
End: 2017-05-10
Attending: FAMILY MEDICINE
Payer: MEDICARE

## 2017-05-10 ENCOUNTER — APPOINTMENT (OUTPATIENT)
Dept: LAB | Facility: OTHER | Age: 82
End: 2017-05-10
Attending: NURSE PRACTITIONER
Payer: MEDICARE

## 2017-05-10 VITALS
SYSTOLIC BLOOD PRESSURE: 112 MMHG | HEART RATE: 87 BPM | BODY MASS INDEX: 26.22 KG/M2 | TEMPERATURE: 98.9 F | WEIGHT: 173 LBS | HEIGHT: 68 IN | DIASTOLIC BLOOD PRESSURE: 86 MMHG | RESPIRATION RATE: 20 BRPM | OXYGEN SATURATION: 97 %

## 2017-05-10 DIAGNOSIS — M54.16 LUMBAR RADICULOPATHY: Primary | ICD-10-CM

## 2017-05-10 DIAGNOSIS — J32.9 SINUSITIS, UNSPECIFIED CHRONICITY, UNSPECIFIED LOCATION: ICD-10-CM

## 2017-05-10 DIAGNOSIS — G47.00 INSOMNIA, UNSPECIFIED TYPE: ICD-10-CM

## 2017-05-10 DIAGNOSIS — C50.921 MALIGNANT NEOPLASM OF RIGHT MALE BREAST, UNSPECIFIED SITE OF BREAST: ICD-10-CM

## 2017-05-10 LAB
BASOPHILS # BLD AUTO: 0 10E9/L (ref 0–0.2)
BASOPHILS NFR BLD AUTO: 0.6 %
DIFFERENTIAL METHOD BLD: ABNORMAL
EOSINOPHIL # BLD AUTO: 0.4 10E9/L (ref 0–0.7)
EOSINOPHIL NFR BLD AUTO: 5.2 %
ERYTHROCYTE [DISTWIDTH] IN BLOOD BY AUTOMATED COUNT: 14.7 % (ref 10–15)
HCT VFR BLD AUTO: 36.7 % (ref 40–53)
HGB BLD-MCNC: 12 G/DL (ref 13.3–17.7)
LYMPHOCYTES # BLD AUTO: 1.7 10E9/L (ref 0.8–5.3)
LYMPHOCYTES NFR BLD AUTO: 24.2 %
MCH RBC QN AUTO: 30.1 PG (ref 26.5–33)
MCHC RBC AUTO-ENTMCNC: 32.7 G/DL (ref 31.5–36.5)
MCV RBC AUTO: 92 FL (ref 78–100)
MONOCYTES # BLD AUTO: 0.6 10E9/L (ref 0–1.3)
MONOCYTES NFR BLD AUTO: 8.4 %
NEUTROPHILS # BLD AUTO: 4.3 10E9/L (ref 1.6–8.3)
NEUTROPHILS NFR BLD AUTO: 61.6 %
PLATELET # BLD AUTO: 269 10E9/L (ref 150–450)
RBC # BLD AUTO: 3.99 10E12/L (ref 4.4–5.9)
WBC # BLD AUTO: 6.9 10E9/L (ref 4–11)

## 2017-05-10 PROCEDURE — 99000 SPECIMEN HANDLING OFFICE-LAB: CPT | Performed by: NURSE PRACTITIONER

## 2017-05-10 PROCEDURE — 86300 IMMUNOASSAY TUMOR CA 15-3: CPT | Mod: ZL | Performed by: NURSE PRACTITIONER

## 2017-05-10 PROCEDURE — 99212 OFFICE O/P EST SF 10 MIN: CPT

## 2017-05-10 PROCEDURE — 83615 LACTATE (LD) (LDH) ENZYME: CPT | Mod: ZL | Performed by: NURSE PRACTITIONER

## 2017-05-10 PROCEDURE — 99213 OFFICE O/P EST LOW 20 MIN: CPT | Performed by: PHYSICIAN ASSISTANT

## 2017-05-10 PROCEDURE — 72100 X-RAY EXAM L-S SPINE 2/3 VWS: CPT | Mod: TC

## 2017-05-10 PROCEDURE — 85025 COMPLETE CBC W/AUTO DIFF WBC: CPT | Mod: ZL | Performed by: NURSE PRACTITIONER

## 2017-05-10 PROCEDURE — 80053 COMPREHEN METABOLIC PANEL: CPT | Mod: ZL | Performed by: NURSE PRACTITIONER

## 2017-05-10 PROCEDURE — 36415 COLL VENOUS BLD VENIPUNCTURE: CPT | Mod: ZL | Performed by: NURSE PRACTITIONER

## 2017-05-10 RX ORDER — ZOLPIDEM TARTRATE 5 MG/1
5 TABLET ORAL
Qty: 30 TABLET | Refills: 5 | Status: SHIPPED | OUTPATIENT
Start: 2017-05-10 | End: 2017-07-10

## 2017-05-10 RX ORDER — PREDNISONE 20 MG/1
40 TABLET ORAL DAILY
Qty: 10 TABLET | Refills: 0 | Status: SHIPPED | OUTPATIENT
Start: 2017-05-10 | End: 2017-05-15

## 2017-05-10 ASSESSMENT — ANXIETY QUESTIONNAIRES
3. WORRYING TOO MUCH ABOUT DIFFERENT THINGS: NOT AT ALL
IF YOU CHECKED OFF ANY PROBLEMS ON THIS QUESTIONNAIRE, HOW DIFFICULT HAVE THESE PROBLEMS MADE IT FOR YOU TO DO YOUR WORK, TAKE CARE OF THINGS AT HOME, OR GET ALONG WITH OTHER PEOPLE: NOT DIFFICULT AT ALL
5. BEING SO RESTLESS THAT IT IS HARD TO SIT STILL: NOT AT ALL
6. BECOMING EASILY ANNOYED OR IRRITABLE: SEVERAL DAYS
GAD7 TOTAL SCORE: 1
1. FEELING NERVOUS, ANXIOUS, OR ON EDGE: NOT AT ALL
2. NOT BEING ABLE TO STOP OR CONTROL WORRYING: NOT AT ALL
4. TROUBLE RELAXING: NOT AT ALL
7. FEELING AFRAID AS IF SOMETHING AWFUL MIGHT HAPPEN: NOT AT ALL

## 2017-05-10 ASSESSMENT — PAIN SCALES - GENERAL: PAINLEVEL: EXTREME PAIN (9)

## 2017-05-10 NOTE — NURSING NOTE
"Chief Complaint   Patient presents with     Pain     back and legs       Initial /86 (BP Location: Left arm, Patient Position: Chair, Cuff Size: Adult Large)  Pulse 87  Temp 98.9  F (37.2  C) (Tympanic)  Resp 20  Ht 5' 8\" (1.727 m)  Wt 173 lb (78.5 kg)  SpO2 97%  BMI 26.3 kg/m2 Estimated body mass index is 26.3 kg/(m^2) as calculated from the following:    Height as of this encounter: 5' 8\" (1.727 m).    Weight as of this encounter: 173 lb (78.5 kg).  Medication Reconciliation: complete   Emelia King      "

## 2017-05-10 NOTE — PROGRESS NOTES
Subjective:  HPI: Lio Daly is a 82 year old male who presents with a 2 month history of low back pain on the left. Pain radiates to the lateral thigh, calf and to bottom of foot with associated paresthesias. Denies injury. Pain is a 5-6/10. It is worse with walking too much. He has been to the chiropractor twice. He got 2 TENS units from him. Next he is having difficulty sleeping. Has taken Ambien in past.    Past Medical History:   Diagnosis Date     Dysphagia 06/28/2004     HTN (hypertension)      Need for prophylactic vaccination and inoculation 12/10/2001     Need for prophylactic vaccination and inoculation, 11/06/2003     Other and unspecified hyperlipidemia 05/01/2001     Prediabetes      Shortness of breath 03/28/2006     Shoulder pain 06/15/2012     Unspecified essential hypertension 05/18/2000     Past Surgical History:   Procedure Laterality Date     CARDIAC SURGERY      angiogram 1992 [facility]     COLONOSCOPY      1998     GENITOURINARY SURGERY      prostate bx [place]     GENITOURINARY SURGERY      Prostate bx [facility]     GI SURGERY      EGD     GI SURGERY      Pt has EGD with removal of food from esophagus     MASTECTOMY SIMPLE, SENTINEL NODE, COMBINED Right 7/27/2015    Procedure: COMBINED MASTECTOMY SIMPLE, SENTINEL NODE;  Surgeon: Aliyah Garsia MD;  Location: [facility] OR     ORTHOPEDIC SURGERY      bilateral knee replacements     Current Outpatient Prescriptions   Medication     ALPRAZolam (XANAX) 0.5 MG tablet     omeprazole (PRILOSEC) 20 MG CR capsule     pravastatin (PRAVACHOL) 40 MG tablet     atenolol (TENORMIN) 50 MG tablet     levothyroxine (SYNTHROID/LEVOTHROID) 75 MCG tablet     tamoxifen (NOLVADEX) 20 MG tablet     tamsulosin (FLOMAX) 0.4 MG 24 hr capsule     triamcinolone (KENALOG) 0.5 % cream     sennosides (SENOKOT) 8.6 MG tablet     psyllium (METAMUCIL) 58.6 % POWD     Ascorbic Acid (VITAMIN C PO)     VENTOLIN  (90 BASE) MCG/ACT inhaler     No current  facility-administered medications for this visit.      Social History   Substance Use Topics     Smoking status: Former Smoker     Types: Cigarettes     Smokeless tobacco: Never Used      Comment: tried to quit-yes     Alcohol use No      Comment: former       ROS:  CONSTITUTIONAL: negative for fever, chills, change in weight  MUSCULOSKELETAL: as above  NEURO: as above    Objective:  B/P: 112/86, T: 98.9, P: 87, R: 20    GEN: healthy, alert and no distress  DERM: no suspicious lesions or rash  MS:ROM of back is decreased due to pain. Patient is NTPP over thoracic and lumbar spine. Is TTP over lumbar paraspinous area and over left SI joint. Straight leg raising is negative  NEURO: Sensation intact. Patellar and achilles DTR's 2+ and symmetric bilateral    Assessment:  (M54.16) Lumbar radiculopathy  (primary encounter diagnosis)  Comment: Wait for read. Prednisone burst at 40 mg  Plan: XR LUMBAR SPINE 2/3 VIEWS (Clinic Performed),         predniSONE (DELTASONE) 20 MG tablet            (G47.00) Insomnia, unspecified type  Plan: zolpidem (AMBIEN) 5 MG tablet                    [unfilled]:      1.  Stretching and strengthening exercises.       2.  Prn heat or ice application.      3.  Follow-up if symptoms persist or worsen.      Katerina Beverly PA-C

## 2017-05-10 NOTE — MR AVS SNAPSHOT
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  Normal or non-critical lab and imaging results will be communicated to you by MyChart, letter or phone within 4 business days after the clinic has received the results. If you do not hear from us within 7 days, please contact the clinic through [facility] or phone. If you have a critical or abnormal lab result, we will notify you by phone as soon as possible.  Submit refill requests through [employer] or call your pharmacy and they will forward the refill request to us. Please allow 3 business days for your refill to be completed.          Additional Information About Your Visit        [employer] Information     [employer] lets you send messages to your doctor, view your test results, renew your prescriptions, schedule appointments and more. To sign up, go to [email or web address] . Click on \"Log in\" on the left side of the screen, which will take you to the Welcome page. Then click on \"Sign up Now\" on the right side of the page.     You will be asked to enter the access " "code listed below, as well as some personal information. Please follow the directions to create your username and password.     Your access code is: [email or web address]  Expires: 2017  5:11 PM     Your access code will  in 90 days. If you need help or a new code, please call your [facility] clinic or [phone].        Care EveryWhere ID     This is your Care EveryWhere ID. This could be used by other organizations to access your [facility] medical records  [id number]        Your Vitals Were     Pulse Temperature Respirations Height Pulse Oximetry BMI (Body Mass Index)    87 98.9  F (37.2  C) (Tympanic) 20 5' 8\" (1.727 m) 97% 26.3 kg/m2       Blood Pressure from Last 3 Encounters:   05/10/17 112/86   17 136/81   17 126/72    Weight from Last 3 Encounters:   05/10/17 173 lb (78.5 kg)   17 181 lb (82.1 kg)   17 175 lb (79.4 kg)              We Performed the Following     Ca27.29  breast tumor marker     CBC with platelets differential     Comprehensive metabolic panel     Lactate Dehydrogenase     XR LUMBAR SPINE 2/3 VIEWS (Clinic Performed)          Today's Medication Changes          These changes are accurate as of: 5/10/17  5:11 PM.  If you have any questions, ask your nurse or doctor.               Start taking these medicines.        Dose/Directions    predniSONE 20 MG tablet   Commonly known as:  DELTASONE   Used for:  Lumbar radiculopathy   Started by:  Katerina Beverly PA        Dose:  40 mg   Take 2 tablets (40 mg) by mouth daily for 5 days   Quantity:  10 tablet   Refills:  0       zolpidem 5 MG tablet   Commonly known as:  AMBIEN   Used for:  Insomnia, unspecified type   Started by:  Katerina Beverly PA        Dose:  5 mg   Take 1 tablet (5 mg) by mouth nightly as needed for sleep   Quantity:  30 tablet   Refills:  5            Where to get your medicines      These medications were sent to [facility] - [place], [place] - [address]  [address], [place] " [place] [address]     Phone:  [phone]     predniSONE 20 MG tablet         Some of these will need a paper prescription and others can be bought over the counter.  Ask your nurse if you have questions.     Bring a paper prescription for each of these medications     zolpidem 5 MG tablet                Primary Care Provider Office Phone # Fax #    Rubén Wren [phone]       [facility] [address]  [facility] [address]        Thank you!     Thank you for choosing [facility]  for your care. Our goal is always to provide you with excellent care. Hearing back from our patients is one way we can continue to improve our services. Please take a few minutes to complete the written survey that you may receive in the mail after your visit with us. Thank you!             Your Updated Medication List - Protect others around you: Learn how to safely use, store and throw away your medicines at www.disposemymeds.org.          This list is accurate as of: 5/10/17  5:11 PM.  Always use your most recent med list.                   Brand Name Dispense Instructions for use    ALPRAZolam 0.5 MG tablet    XANAX    60 tablet    TAKE 1 OR 2 TABLETS BY MOUTH AT BEDTIME AS NEEDED       atenolol 50 MG tablet    TENORMIN    90 tablet    TAKE 1 TABLET BY MOUTH DAILY       levothyroxine 75 MCG tablet    SYNTHROID/LEVOTHROID    30 tablet    TAKE 1 TABLET BY MOUTH DAILY       omeprazole 20 MG CR capsule    priLOSEC    90 capsule    TAKE 1 CAPSULE BY MOUTH DAILY       pravastatin 40 MG tablet    PRAVACHOL    90 tablet    TAKE 1 TABLET BY MOUTH EVERY MORNING AND 1 EVERY OTHER EVENING       predniSONE 20 MG tablet    DELTASONE    10 tablet    Take 2 tablets (40 mg) by mouth daily for 5 days       psyllium 58.6 % Powd    METAMUCIL     Take 1 teaspoonful by mouth daily       sennosides 8.6 MG tablet    SENOKOT     Take 2 tablets by mouth At Bedtime       tamoxifen 20 MG tablet    NOLVADEX    90 tablet     Take 1 tablet (20 mg) by mouth daily       tamsulosin 0.4 MG capsule    FLOMAX    90 capsule    TAKE 1 CAPSULE BY MOUTH DAILY       triamcinolone 0.5 % cream    KENALOG    30 g    Apply sparingly to affected area three times daily.       VENTOLIN  (90 BASE) MCG/ACT Inhaler   Generic drug:  albuterol     18 each    USE 2 PUFFS EVERY 6 HOURS AS NEEDED FOR SHORTNESS OF BREATH/DYSPNEA       VITAMIN C PO      Take 500 mg by mouth daily       zolpidem 5 MG tablet    AMBIEN    30 tablet    Take 1 tablet (5 mg) by mouth nightly as needed for sleep

## 2017-05-11 LAB
ALBUMIN SERPL-MCNC: 3.8 G/DL (ref 3.4–5)
ALP SERPL-CCNC: 58 U/L (ref 40–150)
ALT SERPL W P-5'-P-CCNC: 17 U/L (ref 0–70)
ANION GAP SERPL CALCULATED.3IONS-SCNC: 9 MMOL/L (ref 3–14)
AST SERPL W P-5'-P-CCNC: 19 U/L (ref 0–45)
BILIRUB SERPL-MCNC: 0.2 MG/DL (ref 0.2–1.3)
BUN SERPL-MCNC: 24 MG/DL (ref 7–30)
CALCIUM SERPL-MCNC: 8.8 MG/DL (ref 8.5–10.1)
CHLORIDE SERPL-SCNC: 109 MMOL/L (ref 94–109)
CO2 SERPL-SCNC: 23 MMOL/L (ref 20–32)
CREAT SERPL-MCNC: 0.99 MG/DL (ref 0.66–1.25)
GFR SERPL CREATININE-BSD FRML MDRD: 72 ML/MIN/1.7M2
GLUCOSE SERPL-MCNC: 123 MG/DL (ref 70–99)
LDH SERPL L TO P-CCNC: 194 U/L (ref 85–227)
POTASSIUM SERPL-SCNC: 4.2 MMOL/L (ref 3.4–5.3)
PROT SERPL-MCNC: 7.6 G/DL (ref 6.8–8.8)
SODIUM SERPL-SCNC: 141 MMOL/L (ref 133–144)

## 2017-05-11 ASSESSMENT — PATIENT HEALTH QUESTIONNAIRE - PHQ9: SUM OF ALL RESPONSES TO PHQ QUESTIONS 1-9: 9

## 2017-05-11 ASSESSMENT — ANXIETY QUESTIONNAIRES: GAD7 TOTAL SCORE: 1

## 2017-05-12 DIAGNOSIS — E78.5 HYPERLIPIDEMIA LDL GOAL <130: ICD-10-CM

## 2017-05-12 DIAGNOSIS — N40.0 BPH (BENIGN PROSTATIC HYPERPLASIA): ICD-10-CM

## 2017-05-12 LAB — CANCER AG27-29 SERPL-ACNC: 62 U/ML

## 2017-05-12 RX ORDER — TAMSULOSIN HYDROCHLORIDE 0.4 MG/1
CAPSULE ORAL
Qty: 90 CAPSULE | Refills: 3 | Status: SHIPPED | OUTPATIENT
Start: 2017-05-12 | End: 2018-01-08

## 2017-05-12 RX ORDER — PRAVASTATIN SODIUM 40 MG
TABLET ORAL
Qty: 90 TABLET | Refills: 3 | Status: SHIPPED | OUTPATIENT
Start: 2017-05-12 | End: 2017-11-14

## 2017-05-12 NOTE — TELEPHONE ENCOUNTER
TAMSULOSIN HCL 0.4 MG CAPSULE      Last Written Prescription Date: 12/07/2016  Last Fill Quantity: 90, # refills: 0    Last Office Visit with [facility] or [facility] prescribing provider:  05/10/2017   Future Office Visit:    Next 5 appointments (look out 90 days)     May 17, 2017  3:45 PM CDT   Return Visit with Radha Sales NP   [facility] ([facility])    [address]  [facility] [address]   [phone]                    BP Readings from Last 3 Encounters:   05/10/17 112/86   02/17/17 136/81   02/09/17 126/72      PRAVASTATIN SODIUM 40 MG TAB     Last Written Prescription Date: 03/14/2017  Last Fill Quantity: 90, # refills: 0  Last Office Visit with [facility], [facility] or [facility] prescribing provider: 05/10/2017  Next 5 appointments (look out 90 days)     May 17, 2017  3:45 PM CDT   Return Visit with Radha Sales NP   [facility] ([facility])    [address]  [place] [address]   [phone]                   Lab Results   Component Value Date    CHOL 140 10/11/2016     Lab Results   Component Value Date    HDL 56 10/11/2016     Lab Results   Component Value Date    LDL 61 10/11/2016     Lab Results   Component Value Date    TRIG 113 10/11/2016     Lab Results   Component Value Date    CHOLHDLRATIO 3.4 05/18/2015

## 2017-05-17 ENCOUNTER — ONCOLOGY VISIT (OUTPATIENT)
Dept: ONCOLOGY | Facility: OTHER | Age: 82
End: 2017-05-17
Attending: NURSE PRACTITIONER
Payer: COMMERCIAL

## 2017-05-17 VITALS
HEART RATE: 107 BPM | OXYGEN SATURATION: 95 % | HEIGHT: 67 IN | TEMPERATURE: 98.9 F | SYSTOLIC BLOOD PRESSURE: 129 MMHG | RESPIRATION RATE: 18 BRPM | DIASTOLIC BLOOD PRESSURE: 85 MMHG | WEIGHT: 172 LBS | BODY MASS INDEX: 27 KG/M2

## 2017-05-17 DIAGNOSIS — C50.921 MALIGNANT NEOPLASM OF RIGHT MALE BREAST, UNSPECIFIED SITE OF BREAST: Primary | ICD-10-CM

## 2017-05-17 PROCEDURE — 99213 OFFICE O/P EST LOW 20 MIN: CPT | Performed by: NURSE PRACTITIONER

## 2017-05-17 PROCEDURE — 99212 OFFICE O/P EST SF 10 MIN: CPT

## 2017-05-17 ASSESSMENT — PAIN SCALES - GENERAL: PAINLEVEL: NO PAIN (0)

## 2017-05-17 NOTE — PROGRESS NOTES
Oncology Follow-up Visit:  May 17, 2017    Reason for Visit:  Patient presents with:  RECHECK: Follow up breast cancer.  Legs have been bothering him the last two months  *_* Health Care Directive *_*: has paperwork      Nursing Note and documentation reviewed: yes     HPI:  This is an 82-year-old male patient who presents to the oncology/hematology clinic today in follow up of right breast cancer.  Patient underwent a right mastectomy with axillary node dissection in July 2015. He was placed on 20 mg of tamoxifen and remains on this.  He feels he is tolerating it well.  He is feeling good.  He continues to grieve for his wife and misses her.  He had his flomax increased related to urinary issues and he is urinating better.  He is having trouble falling asleep and tried a few nights of ambien that didn't work  He is going to try Melatonin.  He was dealing with pain in his left lower extremity and low back and has had chiropractic treatments and a burst of prednisone and this is better.  He had xrays through his PCP and he will follow up there for further evaluation as they told him he had arthritis.  He is doing his lymphedema exercises and this is much improved.     Oncologic History: Patient had presented with complaints of right nipple irritation over a six-month period and was noted to have a mass in the right breast. Mammogram was completed followed by biopsy and pathology showed an invasive carcinoma. On 7/27/2015, he underwent a right modified radical mastectomy with axillary node dissection by Dr. Garsia. 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 DCIS was present, grade 2 with expansive comedonecrosis. Node dissection revealed 1/17 nodes revealing micrometastasis. Tumor was ER/KS positive, HER-2/thomas negative. Patient was staged pathologic T1c N1mi M0.      At consultation, patient was not  interested in adjuvant chemotherapy and was placed on tamoxifen 20 mg daily.    BRCA 2+      Current Chemo Regime/TX: Tamoxifen 20mg daily  Current Cycle: n/a  # of completed cycles: n/a      Previous treatment:  n/a    Past Medical History:   Diagnosis Date     Dysphagia 06/28/2004     HTN (hypertension)      Need for prophylactic vaccination and inoculation 12/10/2001     Need for prophylactic vaccination and inoculation, 11/06/2003     Other and unspecified hyperlipidemia 05/01/2001     Prediabetes      Shortness of breath 03/28/2006     Shoulder pain 06/15/2012     Unspecified essential hypertension 05/18/2000       Past Surgical History:   Procedure Laterality Date     CARDIAC SURGERY      angiogram 1992 [facility]     COLONOSCOPY      1998     GENITOURINARY SURGERY      prostate bx [facility]     GENITOURINARY SURGERY      Prostate bx [facility]     GI SURGERY      EGD     GI SURGERY      Pt has EGD with removal of food from esophagus     MASTECTOMY SIMPLE, SENTINEL NODE, COMBINED Right 7/27/2015    Procedure: COMBINED MASTECTOMY SIMPLE, SENTINEL NODE;  Surgeon: Aliyah Garsia MD;  Location: [facility]     ORTHOPEDIC SURGERY      bilateral knee replacements       Family History   Problem Relation Age of Onset     Cancer - colorectal Mother      Eye Disorder Mother      cataracts     Musculoskeletal Disorder Mother      CTS     CANCER Father      lung cancer     Genitourinary Problems Father      prostate problems     C.A.D. Brother      CABG     Eye Disorder Brother      cataracts       Social History     Social History     Marital status:      Spouse name: Marisa     Number of children: N/A     Years of education: N/A     Occupational History     Not on file.     Social History Main Topics     Smoking status: Former Smoker     Types: Cigarettes     Smokeless tobacco: Never Used      Comment: tried to quit-yes     Alcohol use No      Comment: former     Drug use: No     Sexual activity: Not on file     Other  "Topics Concern     Caffeine Concern Yes     coffee, 2 cups daily     Parent/Sibling W/ Cabg, Mi Or Angioplasty Before 65f 55m? No     Social History Narrative       Current Outpatient Prescriptions   Medication     tamsulosin (FLOMAX) 0.4 MG capsule     pravastatin (PRAVACHOL) 40 MG tablet     ALPRAZolam (XANAX) 0.5 MG tablet     omeprazole (PRILOSEC) 20 MG CR capsule     atenolol (TENORMIN) 50 MG tablet     levothyroxine (SYNTHROID/LEVOTHROID) 75 MCG tablet     tamoxifen (NOLVADEX) 20 MG tablet     triamcinolone (KENALOG) 0.5 % cream     psyllium (METAMUCIL) 58.6 % POWD     Ascorbic Acid (VITAMIN C PO)     zolpidem (AMBIEN) 5 MG tablet     sennosides (SENOKOT) 8.6 MG tablet     VENTOLIN  (90 BASE) MCG/ACT inhaler     No current facility-administered medications for this visit.         No Known Allergies    Review Of Systems:  Constitutional: denies fever, weight changes, chills, and night sweats.  Eyes: denies blurred or double vision  Ears/Nose/Throat: denies ear pain, nose problems, difficulty swallowing  Respiratory: denies shortness of breath, cough; gets winded with activity  Skin: denies rash, lesions  Breast/Chest wall: denies pain, lumps or discharge  Cardiovascular: denies chest pain, palpitations, edema  Gastrointestinal: denies abdominal pain, bloating, nausea, vomiting, early satiety  Genitourinary: see hPI  Musculoskeletal:  See HPI  Neurologic: denies lightheadedness, headaches, has numbness or tingling  Psychiatric: denies anxiety, depression  Hematologic/Lymphatic/Immunologic: denies easy bruising, easy bleeding, lumps or bumps noted  Endocrine: Denies increased thirst    Physical Exam:  /85 (BP Location: Left arm, Patient Position: Chair, Cuff Size: Adult Regular)  Pulse 107  Temp 98.9  F (37.2  C) (Tympanic)  Resp 18  Ht 1.702 m (5' 7\")  Wt 78 kg (172 lb)  SpO2 95%  BMI 26.94 kg/m2    GENERAL APPEARANCE: Healthy, alert and in no acute distress.  HEENT: Normocephalic, Sclerae " anicteric. Oropharynx without ulcers, lesions, or thrush.  NECK:  No asymmetry or masses, no thyromegaly.  LYMPHATICS: No palpable cervical, supraclavicular, axillary, or inguinal nodes   RESP: Lungs clear to auscultation bilaterally, respirations regular and easy  CARDIOVASCULAR: Regular rate and rhythm. Normal S1, S2  ABDOMEN: Soft, nontender. Bowel sounds auscultated all 4 quadrants. No palpable organomegaly or masses.  BREAST/Chest wall: no concerning lumps, masses or tenderness bilaterally  MUSCULOSKELETAL: Edema to the right upper extremity remains  SKIN: No suspicious lesions or rashes.  NEURO: Alert and oriented x 3.  Gait steady.  PSYCHIATRIC: Mentation and affect appear normal.  Mood appropriate.    Laboratory:  Component Value Flag Ref Range Units Status Collected Lab   WBC 6.9  4.0 - 11.0 10e9/L Final 05/10/2017  4:17 PM NA   RBC Count 3.99 (L) 4.4 - 5.9 10e12/L Final 05/10/2017  4:17 PM NA   Hemoglobin 12.0 (L) 13.3 - 17.7 g/dL Final 05/10/2017  4:17 PM NA   Hematocrit 36.7 (L) 40.0 - 53.0 % Final 05/10/2017  4:17 PM NA   MCV 92  78 - 100 fl Final 05/10/2017  4:17 PM NA   MCH 30.1  26.5 - 33.0 pg Final 05/10/2017  4:17 PM NA   MCHC 32.7  31.5 - 36.5 g/dL Final 05/10/2017  4:17 PM NA   RDW 14.7  10.0 - 15.0 % Final 05/10/2017  4:17 PM NA   Platelet Count 269  150 - 450 10e9/L Final 05/10/2017  4:17 PM NA   Diff Method     Final 05/10/2017  4:17 PM NA   Automated Method   % Neutrophils 61.6   % Final 05/10/2017  4:17 PM NA   % Lymphocytes 24.2   % Final 05/10/2017  4:17 PM NA   % Monocytes 8.4   % Final 05/10/2017  4:17 PM NA   % Eosinophils 5.2   % Final 05/10/2017  4:17 PM NA   % Basophils 0.6   % Final 05/10/2017  4:17 PM NA   Absolute Neutrophil 4.3  1.6 - 8.3 10e9/L Final 05/10/2017  4:17 PM NA   Absolute Lymphocytes 1.7  0.8 - 5.3 10e9/L Final 05/10/2017  4:17 PM NA   Absolute Monocytes 0.6  0.0 - 1.3 10e9/L Final 05/10/2017  4:17 PM NA   Absolute Eosinophils 0.4  0.0 - 0.7 10e9/L Final 05/10/2017   4:17 PM NA   Absolute Basophils 0.0  0.0 - 0.2 10e9/L Final 05/10/2017  4:17 PM      Component Value Flag Ref Range Units Status Collected Lab   Sodium 141  133 - 144 mmol/L Final 05/10/2017  4:17 PM [facility]   Potassium 4.2  3.4 - 5.3 mmol/L Final 05/10/2017  4:17 PM [facility]   Chloride 109  94 - 109 mmol/L Final 05/10/2017  4:17 PM [facility]   Carbon Dioxide 23  20 - 32 mmol/L Final 05/10/2017  4:17 PM HI   Anion Gap 9  3 - 14 mmol/L Final 05/10/2017  4:17 PM [facility]   Glucose 123 (H) 70 - 99 mg/dL Final 05/10/2017  4:17 PM [facility]   Urea Nitrogen 24  7 - 30 mg/dL Final 05/10/2017  4:17 PM [facility]   Creatinine 0.99  0.66 - 1.25 mg/dL Final 05/10/2017  4:17 PM [facility]   GFR Estimate 72  >60 mL/min/1.7m2 Final 05/10/2017  4:17 PM [facility]   Comment:   Non  GFR Calc   GFR Estimate If Black 87  >60 mL/min/1.7m2 Final 05/10/2017  4:17 PM [facility]   Comment:   African American GFR Calc   Calcium 8.8  8.5 - 10.1 mg/dL Final 05/10/2017  4:17 PM [facility]   Bilirubin Total 0.2  0.2 - 1.3 mg/dL Final 05/10/2017  4:17 PM [facility]   Albumin 3.8  3.4 - 5.0 g/dL Final 05/10/2017  4:17 PM [facility]   Protein Total 7.6  6.8 - 8.8 g/dL Final 05/10/2017  4:17 PM [facility]   Alkaline Phosphatase 58  40 - 150 U/L Final 05/10/2017  4:17 PM [facility]   ALT 17  0 - 70 U/L Final 05/10/2017  4:17 PM [facility]   AST 19  0 - 45 U/L Final 05/10/2017  4:17 PM [facility]     Component Value Flag Ref Range Units Status Collected Lab   Lactate Dehydrogenase 194  85 - 227 U/L Final 05/10/2017  4:17 PM [facility]     Component Value Flag Ref Range Units Status Collected Lab   CA 27-29 62   U/mL Final 05/10/2017  4:17 PM 51     Imaging Studies:  None for today      ASSESSMENT/PLAN:    #1  Breast cancer:  Diagnosed with Pathologic T1c N1mi M0 carcinoma of the right breast in July 2015.  He underwent mastectomy with node dissection with 1/17 nodes revealing micrometastases; tumor was 2cm, ER/ND positive, HER-2/thomas negative.  He'll continue with tamoxifen 20 mg daily and follow up in 3 months with a CBC, CMP, LDH and CA 27.29.    I  encouraged patient to call with any questions or concerns.      Radha Sales  FNP-BC

## 2017-05-17 NOTE — PATIENT INSTRUCTIONS
We would like to see you back in 3 months. Please come 1week(s) prior for lab work our pharmacy and they will send us a request. If you have any questions please call [phone]

## 2017-05-17 NOTE — NURSING NOTE
"Chief Complaint   Patient presents with     RECHECK     Follow up breast cancer.  Legs have been bothering him the last two months       Initial /85 (BP Location: Left arm, Patient Position: Chair, Cuff Size: Adult Regular)  Pulse 107  Temp 98.9  F (37.2  C) (Tympanic)  Resp 18  Ht 1.702 m (5' 7\")  Wt 78 kg (172 lb)  SpO2 95%  BMI 26.94 kg/m2 Estimated body mass index is 26.94 kg/(m^2) as calculated from the following:    Height as of this encounter: 1.702 m (5' 7\").    Weight as of this encounter: 78 kg (172 lb).  Medication Reconciliation: complete     Gi Lazo      Patient was assessed using the NCCN psychosocial distress thermometer. Patient rated the score as a 0. Patient rated current stressors as n/a. Stressors will be brought to the attention of provider or Oncology RN Care Coordinator for a score of 6 or greater or per nurses discretion.     Gi Lazo        "

## 2017-05-17 NOTE — MR AVS SNAPSHOT
"              After Visit Summary   5/17/2017    Lio Daly    MRN: [id number]           Patient Information     Date Of Birth          5/28/1934        Visit Information        Provider Department      5/17/2017 3:45 PM Radha Sales NP [facility]        Care Instructions    We would like to see you back in 3 months. Please come 1week(s) prior for lab work our pharmacy and they will send us a request. If you have any questions please call [phone]        Follow-ups after your visit        Your next 10 appointments already scheduled     Aug 09, 2017  2:00 PM CDT   LAB with NA LAB   [facility] ([facility])    [address]  [facility] [address] [phone]            Aug 16, 2017  2:00 PM CDT   Return Visit with Rahda Sales NP   [facility] ([facility])    [address]  [facility] [id number] [phone]              Who to contact     If you have questions or need follow up information about today's clinic visit or your schedule please contact [facility] directly at [phone].  Normal or non-critical lab and imaging results will be communicated to you by MyChart, letter or phone within 4 business days after the clinic has received the results. If you do not hear from us within 7 days, please contact the clinic through MyChart or phone. If you have a critical or abnormal lab result, we will notify you by phone as soon as possible.  Submit refill requests through Keyade or call your pharmacy and they will forward the refill request to us. Please allow 3 business days for your refill to be completed.          Additional Information About Your Visit        MyChart Information     Keyade lets you send messages to your doctor, view your test results, renew your prescriptions, schedule appointments and more. To sign up, go to [email or web address] . Click on \"Log in\" on the left side of the screen, which " "will take you to the Welcome page. Then click on \"Sign up Now\" on the right side of the page.     You will be asked to enter the access code listed below, as well as some personal information. Please follow the directions to create your username and password.     Your access code is: [email or web address]  Expires: 2017  5:11 PM     Your access code will  in 90 days. If you need help or a new code, please call your [place] clinic or [phone].        Care EveryWhere ID     This is your Care EveryWhere ID. This could be used by other organizations to access your [place] medical records  [id number]        Your Vitals Were     Pulse Temperature Respirations Height Pulse Oximetry BMI (Body Mass Index)    107 98.9  F (37.2  C) (Tympanic) 18 1.702 m (5' 7\") 95% 26.94 kg/m2       Blood Pressure from Last 3 Encounters:   17 129/85   05/10/17 112/86   17 136/81    Weight from Last 3 Encounters:   17 78 kg (172 lb)   05/10/17 78.5 kg (173 lb)   17 82.1 kg (181 lb)              Today, you had the following     No orders found for display         Today's Medication Changes          These changes are accurate as of: 17 11:59 PM.  If you have any questions, ask your nurse or doctor.               These medicines have changed or have updated prescriptions.        Dose/Directions    tamsulosin 0.4 MG capsule   Commonly known as:  FLOMAX   This may have changed:  See the new instructions.   Used for:  BPH (benign prostatic hyperplasia)        TAKE 1 CAPSULE BY MOUTH DAILY   Quantity:  90 capsule   Refills:  3                Primary Care Provider Office Phone # Fax #    Rubén Wren [phone]       [address] [address]        Thank you!     Thank you for [facility]  for your care. Our goal is always to provide you with excellent care. Hearing back from our patients is one way we can continue to improve our services. " Please take a few minutes to complete the written survey that you may receive in the mail after your visit with us. Thank you!             Your Updated Medication List - Protect others around you: Learn how to safely use, store and throw away your medicines at www.disposemymeds.org.          This list is accurate as of: 5/17/17 11:59 PM.  Always use your most recent med list.                   Brand Name Dispense Instructions for use    ALPRAZolam 0.5 MG tablet    XANAX    60 tablet    TAKE 1 OR 2 TABLETS BY MOUTH AT BEDTIME AS NEEDED       atenolol 50 MG tablet    TENORMIN    90 tablet    TAKE 1 TABLET BY MOUTH DAILY       levothyroxine 75 MCG tablet    SYNTHROID/LEVOTHROID    30 tablet    TAKE 1 TABLET BY MOUTH DAILY       omeprazole 20 MG CR capsule    priLOSEC    90 capsule    TAKE 1 CAPSULE BY MOUTH DAILY       pravastatin 40 MG tablet    PRAVACHOL    90 tablet    TAKE 1 TABLET BY MOUTH EVERY MORNING AND 1 EVERY OTHER EVENING       psyllium 58.6 % Powd    METAMUCIL     Take 1 teaspoonful by mouth daily       sennosides 8.6 MG tablet    SENOKOT     Take 2 tablets by mouth At Bedtime Reported on 5/17/2017       tamoxifen 20 MG tablet    NOLVADEX    90 tablet    Take 1 tablet (20 mg) by mouth daily       tamsulosin 0.4 MG capsule    FLOMAX    90 capsule    TAKE 1 CAPSULE BY MOUTH DAILY       triamcinolone 0.5 % cream    KENALOG    30 g    Apply sparingly to affected area three times daily.       VENTOLIN  (90 BASE) MCG/ACT Inhaler   Generic drug:  albuterol     18 each    USE 2 PUFFS EVERY 6 HOURS AS NEEDED FOR SHORTNESS OF BREATH/DYSPNEA       VITAMIN C PO      Take 500 mg by mouth daily       zolpidem 5 MG tablet    AMBIEN    30 tablet    Take 1 tablet (5 mg) by mouth nightly as needed for sleep

## 2017-05-19 ENCOUNTER — TELEPHONE (OUTPATIENT)
Dept: FAMILY MEDICINE | Facility: OTHER | Age: 82
End: 2017-05-19

## 2017-05-19 NOTE — TELEPHONE ENCOUNTER
See note below. I do not see that he has had an MRI in the last 4 years. Does this need to be done first? He saw Katerina Beverly on 05/10/17 for Lumbar Radiculopathy. Please advise regarding Ambien also.

## 2017-05-20 DIAGNOSIS — F41.9 ANXIETY: ICD-10-CM

## 2017-05-20 NOTE — TELEPHONE ENCOUNTER
Should probably see me for both problems when he is able.  We will need an MRI if he wants to get shots and we could get before he sees me.  I would advise melatonin for his sleep OTC 5 mg to start with.  Other meds have a lot of side effects.  Thanks.  Rubén Wren

## 2017-05-22 NOTE — TELEPHONE ENCOUNTER
I called the pt and notified. He has tried the Melatonin and is helping. Can you please put him in the schedule tomorrow at 3:15pm for a FU on back pain, pt is aware.

## 2017-05-23 ENCOUNTER — OFFICE VISIT (OUTPATIENT)
Dept: FAMILY MEDICINE | Facility: OTHER | Age: 82
End: 2017-05-23
Attending: FAMILY MEDICINE
Payer: COMMERCIAL

## 2017-05-23 VITALS
WEIGHT: 165 LBS | OXYGEN SATURATION: 97 % | TEMPERATURE: 97.6 F | SYSTOLIC BLOOD PRESSURE: 116 MMHG | BODY MASS INDEX: 25.84 KG/M2 | DIASTOLIC BLOOD PRESSURE: 70 MMHG | RESPIRATION RATE: 20 BRPM | HEART RATE: 103 BPM

## 2017-05-23 DIAGNOSIS — N40.0 BENIGN NODULAR PROSTATIC HYPERPLASIA, PRESENCE OF LOWER URINARY TRACT SYMPTOMS UNSPECIFIED: ICD-10-CM

## 2017-05-23 DIAGNOSIS — M54.16 LUMBAR RADICULOPATHY: Primary | ICD-10-CM

## 2017-05-23 PROCEDURE — 99212 OFFICE O/P EST SF 10 MIN: CPT

## 2017-05-23 PROCEDURE — 80048 BASIC METABOLIC PNL TOTAL CA: CPT | Mod: ZL | Performed by: FAMILY MEDICINE

## 2017-05-23 PROCEDURE — 99214 OFFICE O/P EST MOD 30 MIN: CPT | Performed by: FAMILY MEDICINE

## 2017-05-23 PROCEDURE — 36415 COLL VENOUS BLD VENIPUNCTURE: CPT | Mod: ZL | Performed by: FAMILY MEDICINE

## 2017-05-23 RX ORDER — PREDNISONE 20 MG/1
TABLET ORAL
Qty: 20 TABLET | Refills: 0 | Status: SHIPPED | OUTPATIENT
Start: 2017-05-23 | End: 2017-07-10

## 2017-05-23 RX ORDER — ALPRAZOLAM 0.5 MG
TABLET ORAL
Qty: 60 TABLET | Refills: 0 | Status: SHIPPED | OUTPATIENT
Start: 2017-05-23 | End: 2017-06-20

## 2017-05-23 ASSESSMENT — PAIN SCALES - GENERAL: PAINLEVEL: MODERATE PAIN (5)

## 2017-05-23 NOTE — MR AVS SNAPSHOT
After Visit Summary   5/23/2017    Lio Daly    MRN: [id number]           Patient Information     Date Of Birth          5/28/1934        Visit Information        Provider Department      5/23/2017 3:15 PM Rubén Wren MD [facility]        Today's Diagnoses     Lumbar radiculopathy    -  1    Benign nodular prostatic hyperplasia, presence of lower urinary tract symptoms unspecified          Care Instructions    F/u with ongoing concerns.         Follow-ups after your visit        Your next 10 appointments already scheduled     May 26, 2017  5:00 PM CDT   Radiology with [facility]   [facility] ([facility] )    [address] [id number] [phone]            Aug 09, 2017  2:00 PM CDT   LAB with NA LAB   [facility] ([facility])    [address]  [facility] [address]   [phone]            Aug 16, 2017  2:00 PM CDT   Return Visit with Radha Sales NP   [facility] ([facility])    [address] [facility] [id number] [phone]              Who to contact     If you have questions or need follow up information about today's clinic visit or your schedule please contact [facility] directly at [phone].  Normal or non-critical lab and imaging results will be communicated to you by XMS Penvisionhart, letter or phone within 4 business days after the clinic has received the results. If you do not hear from us within 7 days, please contact the clinic through XMS Penvisionhart or phone. If you have a critical or abnormal lab result, we will notify you by phone as soon as possible.  Submit refill requests through VB Rags or call your pharmacy and they will forward the refill request to us. Please allow 3 business days for your refill to be completed.          Additional Information About Your Visit        VB Rags Information     VB Rags lets you send messages to your doctor, view your test results,  "renew your prescriptions, schedule appointments and more. To sign up, go to [email or web address] . Click on \"Log in\" on the left side of the screen, which will take you to the Welcome page. Then click on \"Sign up Now\" on the right side of the page.     You will be asked to enter the access code listed below, as well as some personal information. Please follow the directions to create your username and password.     Your access code is: [email or web address]  Expires: 2017  5:11 PM     Your access code will  in 90 days. If you need help or a new code, please call your [place] clinic or [phone].        Care EveryWhere ID     This is your Care EveryWhere ID. This could be used by other organizations to access your [place] medical records  [id number]        Your Vitals Were     Pulse Temperature Respirations Pulse Oximetry BMI (Body Mass Index)       103 97.6  F (36.4  C) (Tympanic) 20 97% 25.84 kg/m2        Blood Pressure from Last 3 Encounters:   17 116/70   17 129/85   05/10/17 112/86    Weight from Last 3 Encounters:   17 165 lb (74.8 kg)   17 172 lb (78 kg)   05/10/17 173 lb (78.5 kg)              We Performed the Following     Basic metabolic panel     MR Lumbar Spine w/o Contrast          Today's Medication Changes          These changes are accurate as of: 17  3:50 PM.  If you have any questions, ask your nurse or doctor.               Start taking these medicines.        Dose/Directions    predniSONE 20 MG tablet   Commonly known as:  DELTASONE   Used for:  Lumbar radiculopathy   Started by:  Rubén Wren MD        Take 3 tabs (60 mg) by mouth daily x 3 days, 2 tabs (40 mg) daily x 3 days, 1 tab (20 mg) daily x 3 days, then 1/2 tab (10 mg) x 3 days.   Quantity:  20 tablet   Refills:  0         These medicines have changed or have updated prescriptions.        Dose/Directions    tamsulosin 0.4 MG capsule   Commonly known as:  FLOMAX   This may have changed:  See " the new instructions.   Used for:  BPH (benign prostatic hyperplasia)        TAKE 1 CAPSULE BY MOUTH DAILY   Quantity:  90 capsule   Refills:  3            Where to get your medicines      These medications were sent to [facility] - [address]  [address] [place] [address]     Phone:  [phone]     predniSONE 20 MG tablet                Primary Care Provider Office Phone # Fax #    Rubén Wren [phone]       [facility] [address]  [facility] [address]        Thank you!     Thank you for choosing [facility]  for your care. Our goal is always to provide you with excellent care. Hearing back from our patients is one way we can continue to improve our services. Please take a few minutes to complete the written survey that you may receive in the mail after your visit with us. Thank you!             Your Updated Medication List - Protect others around you: Learn how to safely use, store and throw away your medicines at www.disposemymeds.org.          This list is accurate as of: 5/23/17  3:50 PM.  Always use your most recent med list.                   Brand Name Dispense Instructions for use    ALPRAZolam 0.5 MG tablet    XANAX    60 tablet    TAKE 1 OR 2 TABLETS BY MOUTH AT BEDTIME AS NEEDED       atenolol 50 MG tablet    TENORMIN    90 tablet    TAKE 1 TABLET BY MOUTH DAILY       levothyroxine 75 MCG tablet    SYNTHROID/LEVOTHROID    30 tablet    TAKE 1 TABLET BY MOUTH DAILY       omeprazole 20 MG CR capsule    priLOSEC    90 capsule    TAKE 1 CAPSULE BY MOUTH DAILY       pravastatin 40 MG tablet    PRAVACHOL    90 tablet    TAKE 1 TABLET BY MOUTH EVERY MORNING AND 1 EVERY OTHER EVENING       predniSONE 20 MG tablet    DELTASONE    20 tablet    Take 3 tabs (60 mg) by mouth daily x 3 days, 2 tabs (40 mg) daily x 3 days, 1 tab (20 mg) daily x 3 days, then 1/2 tab (10 mg) x 3 days.       psyllium 58.6 % Powd    METAMUCIL      Take 1 teaspoonful by mouth daily       sennosides 8.6 MG tablet    SENOKOT     Take 2 tablets by mouth At Bedtime Reported on 5/17/2017       tamoxifen 20 MG tablet    NOLVADEX    90 tablet    Take 1 tablet (20 mg) by mouth daily       tamsulosin 0.4 MG capsule    FLOMAX    90 capsule    TAKE 1 CAPSULE BY MOUTH DAILY       triamcinolone 0.5 % cream    KENALOG    30 g    Apply sparingly to affected area three times daily.       VENTOLIN  (90 BASE) MCG/ACT Inhaler   Generic drug:  albuterol     18 each    USE 2 PUFFS EVERY 6 HOURS AS NEEDED FOR SHORTNESS OF BREATH/DYSPNEA       VITAMIN C PO      Take 500 mg by mouth daily       zolpidem 5 MG tablet    AMBIEN    30 tablet    Take 1 tablet (5 mg) by mouth nightly as needed for sleep

## 2017-05-23 NOTE — PROGRESS NOTES
Lio Daly    May 23, 2017    Chief Complaint   Patient presents with     RECHECK       SUBJECTIVE:  Here for f/u LBP.  Having significant pain in the left leg, numb and tingling.  A lot of back pain as well. Steroid helped and he would like to do this again.  The pain is severe at times with any activity.  Numb pain especially down right leg.  Also, doubled his flomax and it works way better.  I said this is fine.  He has no SE from the increase.      Past Medical History:   Diagnosis Date     Dysphagia 06/28/2004     HTN (hypertension)      Need for prophylactic vaccination and inoculation 12/10/2001     Need for prophylactic vaccination and inoculation, 11/06/2003     Other and unspecified hyperlipidemia 05/01/2001     Prediabetes      Shortness of breath 03/28/2006     Shoulder pain 06/15/2012     Unspecified essential hypertension 05/18/2000       Past Surgical History:   Procedure Laterality Date     CARDIAC SURGERY      angiogram 1992 [facility]     COLONOSCOPY      1998     GENITOURINARY SURGERY      prostate bx [place]     GENITOURINARY SURGERY      Prostate bx [facility]     GI SURGERY      EGD     GI SURGERY      Pt has EGD with removal of food from esophagus     MASTECTOMY SIMPLE, SENTINEL NODE, COMBINED Right 7/27/2015    Procedure: COMBINED MASTECTOMY SIMPLE, SENTINEL NODE;  Surgeon: Aliyah Garsia MD;  Location: [facility]     ORTHOPEDIC SURGERY      bilateral knee replacements       Current Outpatient Prescriptions   Medication Sig Dispense Refill     ALPRAZolam (XANAX) 0.5 MG tablet TAKE 1 OR 2 TABLETS BY MOUTH AT BEDTIME AS NEEDED 60 tablet 0     predniSONE (DELTASONE) 20 MG tablet Take 3 tabs (60 mg) by mouth daily x 3 days, 2 tabs (40 mg) daily x 3 days, 1 tab (20 mg) daily x 3 days, then 1/2 tab (10 mg) x 3 days. 20 tablet 0     tamsulosin (FLOMAX) 0.4 MG capsule TAKE 1 CAPSULE BY MOUTH DAILY (Patient taking differently: Takes one in morning and one at night) 90 capsule 3     pravastatin  (PRAVACHOL) 40 MG tablet TAKE 1 TABLET BY MOUTH EVERY MORNING AND 1 EVERY OTHER EVENING 90 tablet 3     omeprazole (PRILOSEC) 20 MG CR capsule TAKE 1 CAPSULE BY MOUTH DAILY 90 capsule 2     atenolol (TENORMIN) 50 MG tablet TAKE 1 TABLET BY MOUTH DAILY 90 tablet 2     levothyroxine (SYNTHROID/LEVOTHROID) 75 MCG tablet TAKE 1 TABLET BY MOUTH DAILY 30 tablet 11     tamoxifen (NOLVADEX) 20 MG tablet Take 1 tablet (20 mg) by mouth daily 90 tablet 0     triamcinolone (KENALOG) 0.5 % cream Apply sparingly to affected area three times daily. 30 g 1     sennosides (SENOKOT) 8.6 MG tablet Take 2 tablets by mouth At Bedtime Reported on 5/17/2017       psyllium (METAMUCIL) 58.6 % POWD Take 1 teaspoonful by mouth daily       Ascorbic Acid (VITAMIN C PO) Take 500 mg by mouth daily       VENTOLIN  (90 BASE) MCG/ACT inhaler USE 2 PUFFS EVERY 6 HOURS AS NEEDED FOR SHORTNESS OF BREATH/DYSPNEA 18 each 1     zolpidem (AMBIEN) 5 MG tablet Take 1 tablet (5 mg) by mouth nightly as needed for sleep (Patient not taking: Reported on 5/23/2017) 30 tablet 5       No Known Allergies    Family History   Problem Relation Age of Onset     Cancer - colorectal Mother      Eye Disorder Mother      cataracts     Musculoskeletal Disorder Mother      CTS     CANCER Father      lung cancer     Genitourinary Problems Father      prostate problems     C.A.D. Brother      CABG     Eye Disorder Brother      cataracts       Social History     Social History     Marital status:      Spouse name: Marisa     Number of children: N/A     Years of education: N/A     Occupational History     Not on file.     Social History Main Topics     Smoking status: Former Smoker     Types: Cigarettes     Smokeless tobacco: Never Used      Comment: tried to quit-yes     Alcohol use No      Comment: former     Drug use: No     Sexual activity: Not on file     Other Topics Concern     Caffeine Concern Yes     coffee, 2 cups daily     Parent/Sibling W/ Cabg, Mi Or  Angioplasty Before 65f 55m? No     Social History Narrative       5 point ROS negative except as noted above in HPI, including Gen., Resp., CV, GI &  system review.     OBJECTIVE:  B/P: 116/70, T: 97.6, P: 103, R: 20    GENERAL APPEARANCE: Alert, no acute distress  CV: regular rate and rhythm, no murmur, rub or gallop  RESP: lungs clear to auscultation bilaterally  ABDOMEN: normal bowel sounds, soft, nontender, no hepatosplenomegaly or other masses  SKIN: no suspicious lesions or rashes to visualized skin  NEURO: Alert, oriented x 3, speech and mentation normal    ASSESSMENT and PLAN:  (M54.16) Lumbar radiculopathy  (primary encounter diagnosis)  Comment: ongoing  Plan: MR Lumbar Spine w/o Contrast, Basic metabolic         panel, predniSONE (DELTASONE) 20 MG tablet        Get MRI and anticipate setting up for shots.  Steroid burst and taper with the recent use as we get this figured out.      (N40.0) Benign nodular prostatic hyperplasia, presence of lower urinary tract symptoms unspecified  Comment: as above.   Plan: ok for bid flomax.

## 2017-05-23 NOTE — NURSING NOTE
"Chief Complaint   Patient presents with     RECHECK       Initial /70 (BP Location: Left arm, Patient Position: Chair, Cuff Size: Adult Regular)  Pulse 103  Temp 97.6  F (36.4  C) (Tympanic)  Resp 20  Wt 165 lb (74.8 kg)  SpO2 97%  BMI 25.84 kg/m2 Estimated body mass index is 25.84 kg/(m^2) as calculated from the following:    Height as of 5/17/17: 5' 7\" (1.702 m).    Weight as of this encounter: 165 lb (74.8 kg).  Medication Reconciliation: complete   Raven Dixon    "

## 2017-05-24 LAB
ANION GAP SERPL CALCULATED.3IONS-SCNC: 11 MMOL/L (ref 3–14)
BUN SERPL-MCNC: 17 MG/DL (ref 7–30)
CALCIUM SERPL-MCNC: 8.9 MG/DL (ref 8.5–10.1)
CHLORIDE SERPL-SCNC: 110 MMOL/L (ref 94–109)
CO2 SERPL-SCNC: 22 MMOL/L (ref 20–32)
CREAT SERPL-MCNC: 1.07 MG/DL (ref 0.66–1.25)
GFR SERPL CREATININE-BSD FRML MDRD: 66 ML/MIN/1.7M2
GLUCOSE SERPL-MCNC: 122 MG/DL (ref 70–99)
POTASSIUM SERPL-SCNC: 4.8 MMOL/L (ref 3.4–5.3)
SODIUM SERPL-SCNC: 143 MMOL/L (ref 133–144)

## 2017-05-26 ENCOUNTER — HOSPITAL ENCOUNTER (OUTPATIENT)
Dept: MRI IMAGING | Facility: HOSPITAL | Age: 82
Discharge: HOME | End: 2017-05-26
Attending: FAMILY MEDICINE | Admitting: FAMILY MEDICINE
Payer: MEDICARE

## 2017-05-26 PROCEDURE — A9585 GADOBUTROL INJECTION: HCPCS | Mod: TC

## 2017-05-26 PROCEDURE — 72158 MRI LUMBAR SPINE W/O & W/DYE: CPT | Mod: TC

## 2017-05-26 RX ORDER — GADOBUTROL 604.72 MG/ML
7.5 INJECTION INTRAVENOUS ONCE
Status: COMPLETED | OUTPATIENT
Start: 2017-05-26 | End: 2017-05-26

## 2017-05-26 RX ADMIN — GADOBUTROL 7.5 ML: 604.72 INJECTION INTRAVENOUS at 17:53

## 2017-05-31 ENCOUNTER — TELEPHONE (OUTPATIENT)
Dept: FAMILY MEDICINE | Facility: OTHER | Age: 82
End: 2017-05-31

## 2017-05-31 DIAGNOSIS — M48.061 SPINAL STENOSIS OF LUMBAR REGION: Primary | ICD-10-CM

## 2017-06-05 ENCOUNTER — HOSPITAL ENCOUNTER (OUTPATIENT)
Dept: GENERAL RADIOLOGY | Facility: HOSPITAL | Age: 82
Discharge: HOME | End: 2017-06-05
Attending: FAMILY MEDICINE | Admitting: FAMILY MEDICINE
Payer: MEDICARE

## 2017-06-05 DIAGNOSIS — M48.061 SPINAL STENOSIS OF LUMBAR REGION: Primary | ICD-10-CM

## 2017-06-05 PROCEDURE — 99212 OFFICE O/P EST SF 10 MIN: CPT | Mod: TC

## 2017-06-05 NOTE — PROGRESS NOTES
"Pain Management Referral Consult    PATIENT HISTORICAL:    Patient Anatomy/region of concern: lumbar    When and how did your pain begin?   Date 2-17  Event no injury    Location of the pain? Low back pain, bilateral leg pain    Describe what pain feels like: tingling Does it interfere with daily activities? yes    What makes your pain better? tylenol      Focal tenderness at one point along the length of a taut band (\"knotted muscle\")?  No    Restricted range of motion of the involved muscle or joint?  Yes    Did you have a previous injection series where you found relief of at least 3 months?  No  Have you had surgery in the area of pain?   No  If yes, when was the surgery?    What treatments have you already had for your pain?   Add length of time in weeks for all that apply.    Treatment Tried it--helped Tried it-- nohelp Made it worse   Pain clinic      Physical therapy      Chiropractic care      Spine injections       Other nerve blocks      Surgery      Exercise      Others (list):                Injection Documentation of Provider History    PER-PROVIDER HISTORY, Dr. irwin  Is this for treatment of acute herpes zoster, post herpetic neuralgia, post-decompressive radiculitis, or post-surgical scarring?   No  Does the patient have radiculopathy or sciatica?  Yes  How long has the patient had any physical therapy, home therapy or chiropractor care?  0 weeks.  How long has the patient taken NSaids or muscle relaxants?  0 weeks.  Is there any history of systemic/local infection or unstable medical conditions?  No    "

## 2017-06-08 ENCOUNTER — HOSPITAL ENCOUNTER (OUTPATIENT)
Dept: INTERVENTIONAL RADIOLOGY/VASCULAR | Facility: HOSPITAL | Age: 82
Discharge: HOME | End: 2017-06-08
Attending: FAMILY MEDICINE | Admitting: FAMILY MEDICINE
Payer: MEDICARE

## 2017-06-08 PROCEDURE — 62323 NJX INTERLAMINAR LMBR/SAC: CPT | Mod: TC

## 2017-06-08 PROCEDURE — 25000128 H RX IP 250 OP 636: Performed by: RADIOLOGY

## 2017-06-08 RX ORDER — METHYLPREDNISOLONE ACETATE 80 MG/ML
80 INJECTION, SUSPENSION INTRA-ARTICULAR; INTRALESIONAL; INTRAMUSCULAR; SOFT TISSUE ONCE
Status: COMPLETED | OUTPATIENT
Start: 2017-06-08 | End: 2017-06-08

## 2017-06-08 RX ORDER — METHYLPREDNISOLONE ACETATE 80 MG/ML
INJECTION, SUSPENSION INTRA-ARTICULAR; INTRALESIONAL; INTRAMUSCULAR; SOFT TISSUE
Status: DISCONTINUED
Start: 2017-06-08 | End: 2017-06-09 | Stop reason: HOSPADM

## 2017-06-08 RX ORDER — IOPAMIDOL 612 MG/ML
15 INJECTION, SOLUTION INTRATHECAL ONCE
Status: COMPLETED | OUTPATIENT
Start: 2017-06-08 | End: 2017-06-08

## 2017-06-08 RX ADMIN — IOPAMIDOL 6 ML: 612 INJECTION, SOLUTION INTRATHECAL at 14:00

## 2017-06-08 RX ADMIN — METHYLPREDNISOLONE ACETATE 80 MG: 80 INJECTION, SUSPENSION INTRA-ARTICULAR; INTRALESIONAL; INTRAMUSCULAR; SOFT TISSUE at 14:02

## 2017-06-08 NOTE — PROGRESS NOTES
Verified post-procedural status.  There were no complicationsand patient has no symptoms..  The patient had no questions or concerns.Patient reports pain scale of 5/10, and No bleeding.

## 2017-06-08 NOTE — IP AVS SNAPSHOT
[facility]    [address] [place] [address]    Phone:  [phone]    Fax:  [phone]                                       After Visit Summary   6/8/2017    Lio Daly    MRN: [id number]           After Visit Summary Signature Page     I have received my discharge instructions, and my questions have been answered. I have discussed any challenges I see with this plan with the nurse or doctor.    ..........................................................................................................................................  Patient/Patient Representative Signature      ..........................................................................................................................................  Patient Representative Print Name and Relationship to Patient    ..................................................               ................................................  Date                                            Time    ..........................................................................................................................................  Reviewed by Signature/Title    ...................................................              ..............................................  Date                                                            Time

## 2017-06-08 NOTE — DISCHARGE INSTRUCTIONS
Home number on file [phone] (home)  Is it ok to leave a message if you are not home  yes    Dr. Ely completed your epidural steroid injection lumbar procedure on 6/8/2017.    Current Pain Level (0-10 Scale): 8/10  Post Pain Level (0-10):  5/10    Patient will be contacted by a diagnostic imaging nurse via telephone for follow up on pain levels in 2 weeks.    Radiology Discharge instructions for Steroid Injection    Activity Level:     Do not do any heavy activity or exercise for 24 hours.   Do not drive for 4 hours after your injection.  Diet:   Return to your normal diet.  Medications:   If you have stopped taking your Aspirin, Coumadin/Warfarin, Ibuprofen, or any   other blood thinner for this procedure you may resume in the morning unless   your primary care provider has given you other instructions.    Diabetics may see an increase in blood sugar after steroid injections. If you are concerned about your blood sugar, please contact your family doctor.    Site Care:  Remove the bandage and bathe or shower the morning after the procedure.      Call your Primary Care Provider if you have the following (if your primary care provider is not available please seek emergency care):   Nausea with vomiting   Severe headache   Drowsiness or confusion   Redness or drainage at the injection or puncture site   Temperature over 101 degrees F   Other concerns   Worsening back pain   Stiff neck

## 2017-06-08 NOTE — IP AVS SNAPSHOT
MRN:[id number]                      After Visit Summary   6/8/2017    Lio Daly    MRN: [id number]           Visit Information        Provider Department      6/8/2017  1:30 PM Radiologist, Need Interventional; [facility] INTERVENTIONAL ROOM [facility] Interventional Radiology           Review of your medicines      UNREVIEWED medicines. Ask your doctor about these medicines        Dose / Directions    ALPRAZolam 0.5 MG tablet   Commonly known as:  XANAX   Used for:  Anxiety        TAKE 1 OR 2 TABLETS BY MOUTH AT BEDTIME AS NEEDED   Quantity:  60 tablet   Refills:  0       atenolol 50 MG tablet   Commonly known as:  TENORMIN   Used for:  Benign essential hypertension        TAKE 1 TABLET BY MOUTH DAILY   Quantity:  90 tablet   Refills:  2       levothyroxine 75 MCG tablet   Commonly known as:  SYNTHROID/LEVOTHROID   Used for:  Hypothyroidism        TAKE 1 TABLET BY MOUTH DAILY   Quantity:  30 tablet   Refills:  11       omeprazole 20 MG CR capsule   Commonly known as:  priLOSEC   Used for:  Gastroesophageal reflux disease, esophagitis presence not specified        TAKE 1 CAPSULE BY MOUTH DAILY   Quantity:  90 capsule   Refills:  2       pravastatin 40 MG tablet   Commonly known as:  PRAVACHOL   Used for:  Hyperlipidemia LDL goal <130        TAKE 1 TABLET BY MOUTH EVERY MORNING AND 1 EVERY OTHER EVENING   Quantity:  90 tablet   Refills:  3       predniSONE 20 MG tablet   Commonly known as:  DELTASONE   Used for:  Lumbar radiculopathy        Take 3 tabs (60 mg) by mouth daily x 3 days, 2 tabs (40 mg) daily x 3 days, 1 tab (20 mg) daily x 3 days, then 1/2 tab (10 mg) x 3 days.   Quantity:  20 tablet   Refills:  0       psyllium 58.6 % Powd   Commonly known as:  METAMUCIL        Dose:  1 teaspoonful   Take 1 teaspoonful by mouth daily   Refills:  0       sennosides 8.6 MG tablet   Commonly known as:  SENOKOT        Dose:  2 tablet   Take 2 tablets by mouth At Bedtime Reported on 5/17/2017   Refills:  0        tamoxifen 20 MG tablet   Commonly known as:  NOLVADEX   Used for:  Malignant neoplasm of right male breast, unspecified site of breast (H)        Dose:  20 mg   Take 1 tablet (20 mg) by mouth daily   Quantity:  90 tablet   Refills:  0       tamsulosin 0.4 MG capsule   Commonly known as:  FLOMAX   Used for:  BPH (benign prostatic hyperplasia)        TAKE 1 CAPSULE BY MOUTH DAILY   Quantity:  90 capsule   Refills:  3       triamcinolone 0.5 % cream   Commonly known as:  KENALOG   Used for:  Rash        Apply sparingly to affected area three times daily.   Quantity:  30 g   Refills:  1       VENTOLIN  (90 BASE) MCG/ACT Inhaler   Used for:  SOB (shortness of breath)   Generic drug:  albuterol        USE 2 PUFFS EVERY 6 HOURS AS NEEDED FOR SHORTNESS OF BREATH/DYSPNEA   Quantity:  18 each   Refills:  1       VITAMIN C PO        Dose:  500 mg   Take 500 mg by mouth daily   Refills:  0       zolpidem 5 MG tablet   Commonly known as:  AMBIEN   Used for:  Insomnia, unspecified type        Dose:  5 mg   Take 1 tablet (5 mg) by mouth nightly as needed for sleep   Quantity:  30 tablet   Refills:  5                Protect others around you: Learn how to safely use, store and throw away your medicines at www.disposemymeds.org.         Follow-ups after your visit        Your next 10 appointments already scheduled     Aug 09, 2017  2:00 PM CDT   LAB with NA LAB   [facility] ([facility])    [address]  [facility] [address]   [phone]            Aug 16, 2017  2:00 PM CDT   Return Visit with Radha Sales NP   [facility] ([facility])    [address] [place] [address]   [phone]               Care Instructions        Further instructions from your care team       Home number on file [phone] (home)  Is it ok to leave a message if you are not home  yes    Dr. Ely completed your epidural steroid injection lumbar procedure on 6/8/2017.    Current Pain  "Level (0-10 Scale): 8/10  Post Pain Level (0-10):  5/10    Patient will be contacted by a diagnostic imaging nurse via telephone for follow up on pain levels in 2 weeks.    Radiology Discharge instructions for Steroid Injection    Activity Level:     Do not do any heavy activity or exercise for 24 hours.   Do not drive for 4 hours after your injection.  Diet:   Return to your normal diet.  Medications:   If you have stopped taking your Aspirin, Coumadin/Warfarin, Ibuprofen, or any   other blood thinner for this procedure you may resume in the morning unless   your primary care provider has given you other instructions.    Diabetics may see an increase in blood sugar after steroid injections. If you are concerned about your blood sugar, please contact your family doctor.    Site Care:  Remove the bandage and bathe or shower the morning after the procedure.      Call your Primary Care Provider if you have the following (if your primary care provider is not available please seek emergency care):   Nausea with vomiting   Severe headache   Drowsiness or confusion   Redness or drainage at the injection or puncture site   Temperature over 101 degrees F   Other concerns   Worsening back pain   Stiff neck       Additional Information About Your Visit        Snaptu Information     Snaptu lets you send messages to your doctor, view your test results, renew your prescriptions, schedule appointments and more. To sign up, go to [email or web address] . Click on \"Log in\" on the left side of the screen, which will take you to the Welcome page. Then click on \"Sign up Now\" on the right side of the page.     You will be asked to enter the access code listed below, as well as some personal information. Please follow the directions to create your username and password.     Your access code is: [email or web address]  Expires: 2017  5:11 PM     Your access code will  in 90 days. If you need help or a new code, please call your " [facility] or [phone].        Care EveryWhere ID     This is your Care EveryWhere ID. This could be used by other organizations to access your [facility] medical records  [id number]         Primary Care Provider Office Phone # Fax #    Rubén Wren [phone]      Thank you!     Thank you for choosing [facility] for your care. Our goal is always to provide you with excellent care. Hearing back from our patients is one way we can continue to improve our services. Please take a few minutes to complete the written survey that you may receive in the mail after you visit with us. Thank you!             Medication List: This is a list of all your medications and when to take them. Check marks below indicate your daily home schedule. Keep this list as a reference.      Medications           Morning Afternoon Evening Bedtime As Needed    ALPRAZolam 0.5 MG tablet   Commonly known as:  XANAX   TAKE 1 OR 2 TABLETS BY MOUTH AT BEDTIME AS NEEDED                                atenolol 50 MG tablet   Commonly known as:  TENORMIN   TAKE 1 TABLET BY MOUTH DAILY                                levothyroxine 75 MCG tablet   Commonly known as:  SYNTHROID/LEVOTHROID   TAKE 1 TABLET BY MOUTH DAILY                                omeprazole 20 MG CR capsule   Commonly known as:  priLOSEC   TAKE 1 CAPSULE BY MOUTH DAILY                                pravastatin 40 MG tablet   Commonly known as:  PRAVACHOL   TAKE 1 TABLET BY MOUTH EVERY MORNING AND 1 EVERY OTHER EVENING                                predniSONE 20 MG tablet   Commonly known as:  DELTASONE   Take 3 tabs (60 mg) by mouth daily x 3 days, 2 tabs (40 mg) daily x 3 days, 1 tab (20 mg) daily x 3 days, then 1/2 tab (10 mg) x 3 days.                                psyllium 58.6 % Powd   Commonly known as:  METAMUCIL   Take 1 teaspoonful by mouth daily                                sennosides 8.6 MG tablet   Commonly known as:  SENOKOT   Take 2 tablets  by mouth At Bedtime Reported on 5/17/2017                                tamoxifen 20 MG tablet   Commonly known as:  NOLVADEX   Take 1 tablet (20 mg) by mouth daily                                tamsulosin 0.4 MG capsule   Commonly known as:  FLOMAX   TAKE 1 CAPSULE BY MOUTH DAILY                                triamcinolone 0.5 % cream   Commonly known as:  KENALOG   Apply sparingly to affected area three times daily.                                VENTOLIN  (90 BASE) MCG/ACT Inhaler   USE 2 PUFFS EVERY 6 HOURS AS NEEDED FOR SHORTNESS OF BREATH/DYSPNEA   Generic drug:  albuterol                                VITAMIN C PO   Take 500 mg by mouth daily                                zolpidem 5 MG tablet   Commonly known as:  AMBIEN   Take 1 tablet (5 mg) by mouth nightly as needed for sleep

## 2017-06-20 ENCOUNTER — OFFICE VISIT (OUTPATIENT)
Dept: FAMILY MEDICINE | Facility: OTHER | Age: 82
End: 2017-06-20
Attending: PHYSICIAN ASSISTANT
Payer: COMMERCIAL

## 2017-06-20 VITALS
TEMPERATURE: 98 F | HEIGHT: 67 IN | WEIGHT: 163 LBS | SYSTOLIC BLOOD PRESSURE: 124 MMHG | HEART RATE: 76 BPM | OXYGEN SATURATION: 96 % | DIASTOLIC BLOOD PRESSURE: 58 MMHG | RESPIRATION RATE: 20 BRPM | BODY MASS INDEX: 25.58 KG/M2

## 2017-06-20 DIAGNOSIS — A69.20 ERYTHEMA MIGRANS (LYME DISEASE): Primary | ICD-10-CM

## 2017-06-20 DIAGNOSIS — F41.9 ANXIETY: ICD-10-CM

## 2017-06-20 DIAGNOSIS — Z71.89 ACP (ADVANCE CARE PLANNING): Chronic | ICD-10-CM

## 2017-06-20 PROCEDURE — 99212 OFFICE O/P EST SF 10 MIN: CPT

## 2017-06-20 PROCEDURE — 99213 OFFICE O/P EST LOW 20 MIN: CPT | Performed by: PHYSICIAN ASSISTANT

## 2017-06-20 RX ORDER — DOXYCYCLINE 100 MG/1
100 CAPSULE ORAL 2 TIMES DAILY
Qty: 28 CAPSULE | Refills: 0 | Status: SHIPPED | OUTPATIENT
Start: 2017-06-20 | End: 2017-07-10

## 2017-06-20 ASSESSMENT — PAIN SCALES - GENERAL: PAINLEVEL: NO PAIN (0)

## 2017-06-20 NOTE — PROGRESS NOTES
Subjective:  Lio Daly is a 83 year old male who presents after a deer tick bite 5-6 weeks ago. He developed a large rash at site on right chest that is now fading. He denies other related symptoms.        PMH, PSH, FH reviewed and unchanged    Current Outpatient Prescriptions   Medication     ALPRAZolam (XANAX) 0.5 MG tablet     predniSONE (DELTASONE) 20 MG tablet     tamsulosin (FLOMAX) 0.4 MG capsule     pravastatin (PRAVACHOL) 40 MG tablet     zolpidem (AMBIEN) 5 MG tablet     omeprazole (PRILOSEC) 20 MG CR capsule     atenolol (TENORMIN) 50 MG tablet     levothyroxine (SYNTHROID/LEVOTHROID) 75 MCG tablet     tamoxifen (NOLVADEX) 20 MG tablet     triamcinolone (KENALOG) 0.5 % cream     sennosides (SENOKOT) 8.6 MG tablet     psyllium (METAMUCIL) 58.6 % POWD     Ascorbic Acid (VITAMIN C PO)     VENTOLIN  (90 BASE) MCG/ACT inhaler     No current facility-administered medications for this visit.        No Known Allergies    Review of Systems:  Gen: negative for fever, chills, change in weight  Derm: See HPI       Objective:    B/P: 124/58, T: 98, P: 76, R: 20    Physical Exam:  Constitutional: healthy, alert and no distress  Skin: 4.5 cm hyperpigmented rash right chest. Faint target appearance  Psych: Mood is euthymic with corresponding affect      Assessment and Plan  (A69.20) Erythema migrans (Lyme disease)  (primary encounter diagnosis)  Plan: doxycycline (VIBRAMYCIN) 100 MG capsule            (Z71.89) ACP (advance care planning)          Rx per EPIC.  Risk/benefit/side effects and intended purposes discussed.   Follow up with worsening sx or failure to improve or with any questions or concerns.     Katerina Beverly PA-C

## 2017-06-20 NOTE — MR AVS SNAPSHOT
"              After Visit Summary   6/20/2017    Lio Daly    MRN: [id number]           Patient Information     Date Of Birth          5/28/1934        Visit Information        Provider Department      6/20/2017 9:30 AM Katerina Beverly PA [facility]        Today's Diagnoses     Erythema migrans (Lyme disease)    -  1    ACP (advance care planning)           Follow-ups after your visit        Your next 10 appointments already scheduled     Aug 09, 2017  2:00 PM CDT   LAB with NA LAB   [facility] ([facility] )    [address]  [facility] [address]   [phone]            Aug 16, 2017  2:00 PM CDT   Return Visit with Radha Sales NP   [facility] ([facility] )    [address]  [facility] [id number] [phone]              Who to contact     If you have questions or need follow up information about today's clinic visit or your schedule please contact [facility] directly at [phone].  Normal or non-critical lab and imaging results will be communicated to you by CardioVIPhart, letter or phone within 4 business days after the clinic has received the results. If you do not hear from us within 7 days, please contact the clinic through CardioVIPhart or phone. If you have a critical or abnormal lab result, we will notify you by phone as soon as possible.  Submit refill requests through [employer] or call your pharmacy and they will forward the refill request to us. Please allow 3 business days for your refill to be completed.          Additional Information About Your Visit        [employer] Information     EnteroMedics lets you send messages to your doctor, view your test results, renew your prescriptions, schedule appointments and more. To sign up, go to [email or web address] . Click on \"Log in\" on the left side of the screen, which will take you to the Welcome page. Then click on \"Sign up Now\" on the right " "side of the page.     You will be asked to enter the access code listed below, as well as some personal information. Please follow the directions to create your username and password.     Your access code is: [email or web address]  Expires: 2017  5:11 PM     Your access code will  in 90 days. If you need help or a new code, please call your [facility] clinic or [phone].        Care EveryWhere ID     This is your Care EveryWhere ID. This could be used by other organizations to access your [facility] medical records  [id number]        Your Vitals Were     Pulse Temperature Respirations Height Pulse Oximetry BMI (Body Mass Index)    76 98  F (36.7  C) (Tympanic) 20 5' 7\" (1.702 m) 96% 25.53 kg/m2       Blood Pressure from Last 3 Encounters:   17 124/58   17 116/70   17 129/85    Weight from Last 3 Encounters:   17 163 lb (73.9 kg)   17 165 lb (74.8 kg)   17 172 lb (78 kg)              Today, you had the following     No orders found for display         Today's Medication Changes          These changes are accurate as of: 17 10:04 AM.  If you have any questions, ask your nurse or doctor.               Start taking these medicines.        Dose/Directions    doxycycline 100 MG capsule   Commonly known as:  VIBRAMYCIN   Used for:  Erythema migrans (Lyme disease)   Started by:  Katerina Beverly PA        Dose:  100 mg   Take 1 capsule (100 mg) by mouth 2 times daily   Quantity:  28 capsule   Refills:  0         These medicines have changed or have updated prescriptions.        Dose/Directions    tamsulosin 0.4 MG capsule   Commonly known as:  FLOMAX   This may have changed:  See the new instructions.   Used for:  BPH (benign prostatic hyperplasia)        TAKE 1 CAPSULE BY MOUTH DAILY   Quantity:  90 capsule   Refills:  3            Where to get your medicines      These medications were sent to [facility] - [address]  [address], [place] " [place] [address]     Phone:  [phone]     doxycycline 100 MG capsule                Primary Care Provider Office Phone # Fax #    Rubén Wren [phone]       [facility] [address]  [facility] [address]        Thank you!     Thank you for choosing [facility]  for your care. Our goal is always to provide you with excellent care. Hearing back from our patients is one way we can continue to improve our services. Please take a few minutes to complete the written survey that you may receive in the mail after your visit with us. Thank you!             Your Updated Medication List - Protect others around you: Learn how to safely use, store and throw away your medicines at www.disposemymeds.org.          This list is accurate as of: 6/20/17 10:04 AM.  Always use your most recent med list.                   Brand Name Dispense Instructions for use    ALPRAZolam 0.5 MG tablet    XANAX    60 tablet    TAKE 1 OR 2 TABLETS BY MOUTH AT BEDTIME AS NEEDED       atenolol 50 MG tablet    TENORMIN    90 tablet    TAKE 1 TABLET BY MOUTH DAILY       doxycycline 100 MG capsule    VIBRAMYCIN    28 capsule    Take 1 capsule (100 mg) by mouth 2 times daily       levothyroxine 75 MCG tablet    SYNTHROID/LEVOTHROID    30 tablet    TAKE 1 TABLET BY MOUTH DAILY       omeprazole 20 MG CR capsule    priLOSEC    90 capsule    TAKE 1 CAPSULE BY MOUTH DAILY       pravastatin 40 MG tablet    PRAVACHOL    90 tablet    TAKE 1 TABLET BY MOUTH EVERY MORNING AND 1 EVERY OTHER EVENING       predniSONE 20 MG tablet    DELTASONE    20 tablet    Take 3 tabs (60 mg) by mouth daily x 3 days, 2 tabs (40 mg) daily x 3 days, 1 tab (20 mg) daily x 3 days, then 1/2 tab (10 mg) x 3 days.       psyllium 58.6 % Powd    METAMUCIL     Take 1 teaspoonful by mouth daily       sennosides 8.6 MG tablet    SENOKOT     Take 2 tablets by mouth At Bedtime Reported on 5/17/2017       tamoxifen 20 MG tablet    NOLVADEX    90 tablet     Take 1 tablet (20 mg) by mouth daily       tamsulosin 0.4 MG capsule    FLOMAX    90 capsule    TAKE 1 CAPSULE BY MOUTH DAILY       triamcinolone 0.5 % cream    KENALOG    30 g    Apply sparingly to affected area three times daily.       VENTOLIN  (90 BASE) MCG/ACT Inhaler   Generic drug:  albuterol     18 each    USE 2 PUFFS EVERY 6 HOURS AS NEEDED FOR SHORTNESS OF BREATH/DYSPNEA       VITAMIN C PO      Take 500 mg by mouth daily       zolpidem 5 MG tablet    AMBIEN    30 tablet    Take 1 tablet (5 mg) by mouth nightly as needed for sleep

## 2017-06-20 NOTE — NURSING NOTE
"Chief Complaint   Patient presents with     Insect Bites     Pt had a wood tick bite 5-6 weeks ago. Pt has slight redness. Pt did have redness at onset. Pt has numbness and tingling in feet. Pt numbness in left foot radiates to knee. Pt has back problems and did have this before the YOJANA. No fever noted. Pt did have a dizzy spell once since bite, but did not last long.          Initial /58 (BP Location: Right arm, Patient Position: Chair, Cuff Size: Adult Regular)  Pulse 76  Temp 98  F (36.7  C) (Tympanic)  Resp 20  Ht 5' 7\" (1.702 m)  Wt 163 lb (73.9 kg)  SpO2 96%  BMI 25.53 kg/m2 Estimated body mass index is 25.53 kg/(m^2) as calculated from the following:    Height as of this encounter: 5' 7\" (1.702 m).    Weight as of this encounter: 163 lb (73.9 kg).  Medication Reconciliation: complete   Flor Wyman    "

## 2017-06-22 ENCOUNTER — TELEPHONE (OUTPATIENT)
Dept: INTERVENTIONAL RADIOLOGY/VASCULAR | Facility: HOSPITAL | Age: 82
End: 2017-06-22

## 2017-06-22 RX ORDER — ALPRAZOLAM 0.5 MG
TABLET ORAL
Qty: 60 TABLET | Refills: 0 | Status: SHIPPED | OUTPATIENT
Start: 2017-06-22 | End: 2017-07-20

## 2017-06-30 ENCOUNTER — TELEPHONE (OUTPATIENT)
Dept: INTERVENTIONAL RADIOLOGY/VASCULAR | Facility: HOSPITAL | Age: 82
End: 2017-06-30

## 2017-06-30 DIAGNOSIS — M54.50 LOW BACK PAIN: ICD-10-CM

## 2017-06-30 DIAGNOSIS — R20.0 NUMBNESS OF LOWER EXTREMITY: ICD-10-CM

## 2017-06-30 DIAGNOSIS — M48.00 SPINAL STENOSIS: Primary | ICD-10-CM

## 2017-06-30 NOTE — TELEPHONE ENCOUNTER
Lio was called for follow up to his YOJANA injection.  Dr. Ely recommended trying YOJANA Transforaminal Bilateral L5-S1.  Order is placed in [facility].  Lio in on antibiotics starting the 3rd of July and will schedule after the 13th of July per protocol of antibiotics for injections.  He will call to schedule.

## 2017-07-03 ENCOUNTER — TELEPHONE (OUTPATIENT)
Dept: FAMILY MEDICINE | Facility: OTHER | Age: 82
End: 2017-07-03

## 2017-07-03 DIAGNOSIS — I10 BENIGN ESSENTIAL HYPERTENSION: ICD-10-CM

## 2017-07-03 NOTE — TELEPHONE ENCOUNTER
8:45 AM    Reason for Call: Phone Call    Description: patient needs clarification on status of his pain shot.  The shot was put on hold due to an antibiotic he was on for a tick bite.  Patient received a phone call from hospital on Friday 6/30/17 5:30pm explaining something about the shot that he did not quite understand.  Patient is asking that you reach out the hospital number that was left on his machine ([phone]) to find out exactly what needs to be done and call him back with that information.    Was an appointment offered for this call? n/a    Preferred method for responding to this message: Telephone Call    If we cannot reach you directly, may we leave a detailed response at the number you provided? Yes    Can this message wait until your PCP/provider returns, if available today? No, please call him today if possible (patient knows dr will not be back until 7/10)    Taty Gomez

## 2017-07-03 NOTE — TELEPHONE ENCOUNTER
1:05 PM    Reason for Call: OVERBOOK    Patient is having the following symptoms: Numbness/bilateral legs for 1 months.    The patient is requesting an appointment for July 10th or 11th with Dr Wren.    Was an appointment offered for this call? Yes    Preferred method for responding to this message: Telephone Call    If we cannot reach you directly, may we leave a detailed response at the number you provided? Yes    Can this message wait until your PCP/provider returns, if unavailable today? YES, pt aware provider out until next Monday    Shawna Calhoun

## 2017-07-03 NOTE — TELEPHONE ENCOUNTER
I called the patient because he was wondering if Dr. Wren had to put the order in for the next YOJANA injection.  I let him know the order has already been placed and he can call to schedule the next injection after July 13 per telephone call from 6/30/17.  Arin Heller LPN

## 2017-07-05 RX ORDER — ATENOLOL 50 MG/1
TABLET ORAL
Qty: 90 TABLET | Refills: 2 | Status: SHIPPED | OUTPATIENT
Start: 2017-07-05 | End: 2018-09-17

## 2017-07-10 ENCOUNTER — OFFICE VISIT (OUTPATIENT)
Dept: FAMILY MEDICINE | Facility: OTHER | Age: 82
End: 2017-07-10
Attending: FAMILY MEDICINE
Payer: COMMERCIAL

## 2017-07-10 VITALS
SYSTOLIC BLOOD PRESSURE: 110 MMHG | DIASTOLIC BLOOD PRESSURE: 70 MMHG | BODY MASS INDEX: 25.9 KG/M2 | WEIGHT: 165 LBS | TEMPERATURE: 97.2 F | HEIGHT: 67 IN | HEART RATE: 64 BPM

## 2017-07-10 DIAGNOSIS — M48.062 SPINAL STENOSIS OF LUMBAR REGION WITH NEUROGENIC CLAUDICATION: Primary | ICD-10-CM

## 2017-07-10 DIAGNOSIS — Z51.5 END OF LIFE CARE: ICD-10-CM

## 2017-07-10 DIAGNOSIS — Z71.89 ACP (ADVANCE CARE PLANNING): Chronic | ICD-10-CM

## 2017-07-10 PROCEDURE — 99212 OFFICE O/P EST SF 10 MIN: CPT

## 2017-07-10 PROCEDURE — 99214 OFFICE O/P EST MOD 30 MIN: CPT | Performed by: FAMILY MEDICINE

## 2017-07-10 RX ORDER — GABAPENTIN 100 MG/1
100 CAPSULE ORAL AT BEDTIME
Qty: 30 CAPSULE | Refills: 1 | Status: SHIPPED | OUTPATIENT
Start: 2017-07-10 | End: 2017-08-21

## 2017-07-10 ASSESSMENT — PAIN SCALES - GENERAL: PAINLEVEL: SEVERE PAIN (7)

## 2017-07-10 ASSESSMENT — ANXIETY QUESTIONNAIRES
GAD7 TOTAL SCORE: 2
4. TROUBLE RELAXING: NOT AT ALL
7. FEELING AFRAID AS IF SOMETHING AWFUL MIGHT HAPPEN: NOT AT ALL
3. WORRYING TOO MUCH ABOUT DIFFERENT THINGS: SEVERAL DAYS
1. FEELING NERVOUS, ANXIOUS, OR ON EDGE: NOT AT ALL
2. NOT BEING ABLE TO STOP OR CONTROL WORRYING: NOT AT ALL
5. BEING SO RESTLESS THAT IT IS HARD TO SIT STILL: NOT AT ALL
IF YOU CHECKED OFF ANY PROBLEMS ON THIS QUESTIONNAIRE, HOW DIFFICULT HAVE THESE PROBLEMS MADE IT FOR YOU TO DO YOUR WORK, TAKE CARE OF THINGS AT HOME, OR GET ALONG WITH OTHER PEOPLE: NOT DIFFICULT AT ALL
6. BECOMING EASILY ANNOYED OR IRRITABLE: SEVERAL DAYS

## 2017-07-10 NOTE — MR AVS SNAPSHOT
After Visit Summary   7/10/2017    Lio Daly    MRN: [id number]           Patient Information     Date Of Birth          5/28/1934        Visit Information        Provider Department      7/10/2017 8:30 AM Rubén Wren MD [facility]        Today's Diagnoses     Spinal stenosis of lumbar region with neurogenic claudication    -  1    ACP (advance care planning)        End of life care          Care Instructions    F/u with ongoing concerns.           Follow-ups after your visit        Your next 10 appointments already scheduled     Jul 14, 2017 11:30 AM CDT   Radiology with Need Interventional Radiologist, [facility] INTERVENTIONAL ROOM   [facility] ([facility] )    [address] [address]   [phone]            Aug 09, 2017  2:00 PM CDT   LAB with NA LAB   [facility] ([facility] )    [address]  [facility] [address]   [phone]            Aug 16, 2017  2:00 PM CDT   Return Visit with Radha Sales NP   [facility] ([facility] )    [address]  [facility] [address]   [phone]              Who to contact     If you have questions or need follow up information about today's clinic visit or your schedule please contact [facility] directly at [phone].  Normal or non-critical lab and imaging results will be communicated to you by MyChart, letter or phone within 4 business days after the clinic has received the results. If you do not hear from us within 7 days, please contact the clinic through MyChart or phone. If you have a critical or abnormal lab result, we will notify you by phone as soon as possible.  Submit refill requests through K[employer] or call your pharmacy and they will forward the refill request to us. Please allow 3 business days for your refill to be completed.          Additional Information About Your Visit       "  MyChart Information     [email or web address] lets you send messages to your doctor, view your test results, renew your prescriptions, schedule appointments and more. To sign up, go to [email or web address] . Click on \"Log in\" on the left side of the screen, which will take you to the Welcome page. Then click on \"Sign up Now\" on the right side of the page.     You will be asked to enter the access code listed below, as well as some personal information. Please follow the directions to create your username and password.     Your access code is: [email or web address]  Expires: 2017  5:11 PM     Your access code will  in 90 days. If you need help or a new code, please call your [facility] [place] clinic or [phone].        Care EveryWhere ID     This is your Care EveryWhere ID. This could be used by other organizations to access your [facility] medical records  [id number]        Your Vitals Were     Pulse Temperature Height BMI (Body Mass Index)          64 97.2  F (36.2  C) 5' 7\" (1.702 m) 25.84 kg/m2         Blood Pressure from Last 3 Encounters:   07/10/17 110/70   17 124/58   17 116/70    Weight from Last 3 Encounters:   07/10/17 165 lb (74.8 kg)   17 163 lb (73.9 kg)   17 165 lb (74.8 kg)              We Performed the Following     DNR/DNI          Today's Medication Changes          These changes are accurate as of: 7/10/17 12:29 PM.  If you have any questions, ask your nurse or doctor.               Start taking these medicines.        Dose/Directions    gabapentin 100 MG capsule   Commonly known as:  NEURONTIN   Used for:  Spinal stenosis of lumbar region with neurogenic claudication   Started by:  Rubén Wren MD        Dose:  100 mg   Take 1 capsule (100 mg) by mouth At Bedtime   Quantity:  30 capsule   Refills:  1         These medicines have changed or have updated prescriptions.        Dose/Directions    tamsulosin 0.4 MG capsule   Commonly known as:  FLOMAX   This may have changed:  See the " new instructions.   Used for:  BPH (benign prostatic hyperplasia)        TAKE 1 CAPSULE BY MOUTH DAILY   Quantity:  90 capsule   Refills:  3         Stop taking these medicines if you haven't already. Please contact your care team if you have questions.     zolpidem 5 MG tablet   Commonly known as:  AMBIEN   Stopped by:  Rubén Wren MD                Where to get your medicines      These medications were sent to [facility] [place] - [address]  [address] [facility] [place] [address]     Phone:  [phone]     gabapentin 100 MG capsule                Primary Care Provider Office Phone # Fax #    Rubén Wren [phone]       [facility] [address]  [facility] [address]        Equal Access to Services     [facility] : Hadii aad ku hadasho Soomaali, waaxda luqadaha, qaybta kaalmada adeegyada, waxay idiin hayaan harry snell . So [facility] [phone].    ATENCIÓN: Si habla español, tiene a dc disposición servicios gratuitos de asistencia lingüística. Llame al [phone].    We comply with applicable federal civil rights laws and [place] laws. We do not discriminate on the basis of race, color, national origin, age, disability sex, sexual orientation or gender identity.            Thank you!     Thank you for choosing [facility]  for your care. Our goal is always to provide you with excellent care. Hearing back from our patients is one way we can continue to improve our services. Please take a few minutes to complete the written survey that you may receive in the mail after your visit with us. Thank you!             Your Updated Medication List - Protect others around you: Learn how to safely use, store and throw away your medicines at www.disposemymeds.org.          This list is accurate as of: 7/10/17 12:29 PM.  Always use your most recent med list.                   Brand Name Dispense Instructions for use Diagnosis     ALPRAZolam 0.5 MG tablet    XANAX    60 tablet    TAKE 1 OR 2 TABLETS BY MOUTH AT BEDTIME AS NEEDED    Anxiety       atenolol 50 MG tablet    TENORMIN    90 tablet    TAKE 1 TABLET BY MOUTH DAILY    Benign essential hypertension       gabapentin 100 MG capsule    NEURONTIN    30 capsule    Take 1 capsule (100 mg) by mouth At Bedtime    Spinal stenosis of lumbar region with neurogenic claudication       levothyroxine 75 MCG tablet    SYNTHROID/LEVOTHROID    30 tablet    TAKE 1 TABLET BY MOUTH DAILY    Hypothyroidism       MELATONIN PO      Take 5 mg by mouth At Bedtime        omeprazole 20 MG CR capsule    priLOSEC    90 capsule    TAKE 1 CAPSULE BY MOUTH DAILY    Gastroesophageal reflux disease, esophagitis presence not specified       pravastatin 40 MG tablet    PRAVACHOL    90 tablet    TAKE 1 TABLET BY MOUTH EVERY MORNING AND 1 EVERY OTHER EVENING    Hyperlipidemia LDL goal <130       psyllium 58.6 % Powd    METAMUCIL     Take 1 teaspoonful by mouth daily        sennosides 8.6 MG tablet    SENOKOT     Take 2 tablets by mouth At Bedtime Reported on 5/17/2017        tamoxifen 20 MG tablet    NOLVADEX    90 tablet    Take 1 tablet (20 mg) by mouth daily    Malignant neoplasm of right male breast, unspecified site of breast       tamsulosin 0.4 MG capsule    FLOMAX    90 capsule    TAKE 1 CAPSULE BY MOUTH DAILY    BPH (benign prostatic hyperplasia)       triamcinolone 0.5 % cream    KENALOG    30 g    Apply sparingly to affected area three times daily.    Rash       VENTOLIN  (90 BASE) MCG/ACT Inhaler   Generic drug:  albuterol     18 each    USE 2 PUFFS EVERY 6 HOURS AS NEEDED FOR SHORTNESS OF BREATH/DYSPNEA    SOB (shortness of breath)       VITAMIN C PO      Take 500 mg by mouth daily

## 2017-07-10 NOTE — PROGRESS NOTES
Leodansylvie WILCOX Malika    July 10, 2017    Chief Complaint   Patient presents with     Numbness     x 6 weeks numbness and tingling bilateral legs       SUBJECTIVE:  Here for f/u.  Ongoing leg numbness and pain.  We discussed options at length, along with end of life cares.  See below.      Past Medical History:   Diagnosis Date     Dysphagia 06/28/2004     HTN (hypertension)      Need for prophylactic vaccination and inoculation 12/10/2001     Need for prophylactic vaccination and inoculation, 11/06/2003     Other and unspecified hyperlipidemia 05/01/2001     Prediabetes      Shortness of breath 03/28/2006     Shoulder pain 06/15/2012     Unspecified essential hypertension 05/18/2000       Past Surgical History:   Procedure Laterality Date     CARDIAC SURGERY      angiogram 1992 [facility]     COLONOSCOPY      1998     GENITOURINARY SURGERY      prostate bx [facility]     GENITOURINARY SURGERY      Prostate bx [facility]     GI SURGERY      EGD     GI SURGERY      Pt has EGD with removal of food from esophagus     MASTECTOMY SIMPLE, SENTINEL NODE, COMBINED Right 7/27/2015    Procedure: COMBINED MASTECTOMY SIMPLE, SENTINEL NODE;  Surgeon: Aliyah Garsia MD;  Location: [facility] OR     ORTHOPEDIC SURGERY      bilateral knee replacements       Current Outpatient Prescriptions   Medication Sig Dispense Refill     MELATONIN PO Take 5 mg by mouth At Bedtime       gabapentin (NEURONTIN) 100 MG capsule Take 1 capsule (100 mg) by mouth At Bedtime 30 capsule 1     atenolol (TENORMIN) 50 MG tablet TAKE 1 TABLET BY MOUTH DAILY 90 tablet 2     ALPRAZolam (XANAX) 0.5 MG tablet TAKE 1 OR 2 TABLETS BY MOUTH AT BEDTIME AS NEEDED 60 tablet 0     tamsulosin (FLOMAX) 0.4 MG capsule TAKE 1 CAPSULE BY MOUTH DAILY (Patient taking differently: Takes one in morning and one at night) 90 capsule 3     pravastatin (PRAVACHOL) 40 MG tablet TAKE 1 TABLET BY MOUTH EVERY MORNING AND 1 EVERY OTHER EVENING 90 tablet 3     omeprazole (PRILOSEC) 20 MG CR capsule  TAKE 1 CAPSULE BY MOUTH DAILY 90 capsule 2     levothyroxine (SYNTHROID/LEVOTHROID) 75 MCG tablet TAKE 1 TABLET BY MOUTH DAILY 30 tablet 11     tamoxifen (NOLVADEX) 20 MG tablet Take 1 tablet (20 mg) by mouth daily 90 tablet 0     triamcinolone (KENALOG) 0.5 % cream Apply sparingly to affected area three times daily. 30 g 1     sennosides (SENOKOT) 8.6 MG tablet Take 2 tablets by mouth At Bedtime Reported on 5/17/2017       psyllium (METAMUCIL) 58.6 % POWD Take 1 teaspoonful by mouth daily       Ascorbic Acid (VITAMIN C PO) Take 500 mg by mouth daily       VENTOLIN  (90 BASE) MCG/ACT inhaler USE 2 PUFFS EVERY 6 HOURS AS NEEDED FOR SHORTNESS OF BREATH/DYSPNEA 18 each 1       No Known Allergies    Family History   Problem Relation Age of Onset     Cancer - colorectal Mother      Eye Disorder Mother      cataracts     Musculoskeletal Disorder Mother      CTS     CANCER Father      lung cancer     Genitourinary Problems Father      prostate problems     C.A.D. Brother      CABG     Eye Disorder Brother      cataracts     Breast Cancer Other      Ovarian Cancer Other      Pancreatic Cancer Other        Social History     Social History     Marital status:      Spouse name: Marisa     Number of children: N/A     Years of education: N/A     Occupational History     Not on file.     Social History Main Topics     Smoking status: Former Smoker     Types: Cigarettes     Smokeless tobacco: Never Used      Comment: tried to quit-yes     Alcohol use No      Comment: former     Drug use: No     Sexual activity: Not on file     Other Topics Concern     Caffeine Concern Yes     coffee, 2 cups daily     Parent/Sibling W/ Cabg, Mi Or Angioplasty Before 65f 55m? No     Social History Narrative       5 point ROS negative except as noted above in HPI, including Gen., Resp., CV, GI &  system review.     OBJECTIVE:  B/P: 110/70, T: 97.2, P: 64, R: Data Unavailable    GENERAL APPEARANCE: Alert, no acute distress  SKIN: no  suspicious lesions or rashes to visualized skin  NEURO: Alert, oriented x 3, speech and mentation normal    ASSESSMENT and PLAN:  (M48.06) Spinal stenosis of lumbar region with neurogenic claudication  (primary encounter diagnosis)  Comment: ongoing  Plan: gabapentin (NEURONTIN) 100 MG capsule        Try the last shot first.  I showed him a model of the spine and explained.  If shot fails try neurontin and f/u in 3 weeks.  He undestands and we discussed risk/benefit.     (Z51.5) End of life care  Comment: discussed with daughter here.    Plan: DNR is very clear.  We discussed this at some length.      25 minutes spent with patient and daughter with back cares and end of life cares.  Over 50% in counseling about the end of life cares and his low back and the nerves, etc.

## 2017-07-10 NOTE — NURSING NOTE
"Chief Complaint   Patient presents with     Numbness       Initial /70  Pulse 64  Temp 97.2  F (36.2  C)  Ht 5' 7\" (1.702 m)  Wt 165 lb (74.8 kg)  BMI 25.84 kg/m2 Estimated body mass index is 25.84 kg/(m^2) as calculated from the following:    Height as of this encounter: 5' 7\" (1.702 m).    Weight as of this encounter: 165 lb (74.8 kg).  Medication Reconciliation: complete   Tonia Noriega    "

## 2017-07-11 ASSESSMENT — ANXIETY QUESTIONNAIRES: GAD7 TOTAL SCORE: 2

## 2017-07-11 ASSESSMENT — PATIENT HEALTH QUESTIONNAIRE - PHQ9: SUM OF ALL RESPONSES TO PHQ QUESTIONS 1-9: 10

## 2017-07-14 ENCOUNTER — HOSPITAL ENCOUNTER (OUTPATIENT)
Dept: INTERVENTIONAL RADIOLOGY/VASCULAR | Facility: HOSPITAL | Age: 82
Discharge: HOME | End: 2017-07-14
Attending: FAMILY MEDICINE | Admitting: FAMILY MEDICINE
Payer: MEDICARE

## 2017-07-14 PROCEDURE — 25000128 H RX IP 250 OP 636: Performed by: RADIOLOGY

## 2017-07-14 PROCEDURE — 64483 NJX AA&/STRD TFRM EPI L/S 1: CPT | Mod: TC | Performed by: RADIOLOGY

## 2017-07-14 PROCEDURE — 25000125 ZZHC RX 250

## 2017-07-14 PROCEDURE — 64483 NJX AA&/STRD TFRM EPI L/S 1: CPT | Mod: TC

## 2017-07-14 RX ORDER — DEXAMETHASONE SODIUM PHOSPHATE 10 MG/ML
INJECTION, SOLUTION INTRAMUSCULAR; INTRAVENOUS
Status: DISPENSED
Start: 2017-07-14 | End: 2017-07-14

## 2017-07-14 RX ORDER — DEXAMETHASONE SODIUM PHOSPHATE 10 MG/ML
10 INJECTION, SOLUTION INTRAMUSCULAR; INTRAVENOUS ONCE
Status: COMPLETED | OUTPATIENT
Start: 2017-07-14 | End: 2017-07-14

## 2017-07-14 RX ORDER — IOPAMIDOL 612 MG/ML
15 INJECTION, SOLUTION INTRATHECAL ONCE
Status: COMPLETED | OUTPATIENT
Start: 2017-07-14 | End: 2017-07-14

## 2017-07-14 RX ORDER — LIDOCAINE HYDROCHLORIDE 10 MG/ML
5 INJECTION, SOLUTION INFILTRATION; PERINEURAL ONCE
Status: DISCONTINUED | OUTPATIENT
Start: 2017-07-14 | End: 2017-07-15 | Stop reason: HOSPADM

## 2017-07-14 RX ORDER — LIDOCAINE HYDROCHLORIDE 10 MG/ML
INJECTION, SOLUTION EPIDURAL; INFILTRATION; INTRACAUDAL; PERINEURAL
Status: COMPLETED
Start: 2017-07-14 | End: 2017-07-14

## 2017-07-14 RX ADMIN — DEXAMETHASONE SODIUM PHOSPHATE 10 MG: 10 INJECTION INTRAMUSCULAR; INTRAVENOUS at 12:14

## 2017-07-14 RX ADMIN — DEXAMETHASONE SODIUM PHOSPHATE 10 MG: 10 INJECTION INTRAMUSCULAR; INTRAVENOUS at 12:13

## 2017-07-14 RX ADMIN — LIDOCAINE HYDROCHLORIDE 2 ML: 10 INJECTION, SOLUTION EPIDURAL; INFILTRATION; INTRACAUDAL; PERINEURAL at 12:13

## 2017-07-14 RX ADMIN — IOPAMIDOL 4 ML: 612 INJECTION, SOLUTION INTRATHECAL at 12:12

## 2017-07-14 NOTE — IP AVS SNAPSHOT
MRN:[id number]                      After Visit Summary   7/14/2017    Lio Daly    MRN: [id number]           Visit Information        Provider Department      7/14/2017 11:30 AM Radiologist, Need Interventional; [facility] INTERVENTIONAL ROOM [facility] Interventional Radiology           Review of your medicines      UNREVIEWED medicines. Ask your doctor about these medicines        Dose / Directions    ALPRAZolam 0.5 MG tablet   Commonly known as:  XANAX   Used for:  Anxiety        TAKE 1 OR 2 TABLETS BY MOUTH AT BEDTIME AS NEEDED   Quantity:  60 tablet   Refills:  0       atenolol 50 MG tablet   Commonly known as:  TENORMIN   Used for:  Benign essential hypertension        TAKE 1 TABLET BY MOUTH DAILY   Quantity:  90 tablet   Refills:  2       gabapentin 100 MG capsule   Commonly known as:  NEURONTIN   Used for:  Spinal stenosis of lumbar region with neurogenic claudication        Dose:  100 mg   Take 1 capsule (100 mg) by mouth At Bedtime   Quantity:  30 capsule   Refills:  1       levothyroxine 75 MCG tablet   Commonly known as:  SYNTHROID/LEVOTHROID   Used for:  Hypothyroidism        TAKE 1 TABLET BY MOUTH DAILY   Quantity:  30 tablet   Refills:  11       MELATONIN PO        Dose:  5 mg   Take 5 mg by mouth At Bedtime   Refills:  0       omeprazole 20 MG CR capsule   Commonly known as:  priLOSEC   Used for:  Gastroesophageal reflux disease, esophagitis presence not specified        TAKE 1 CAPSULE BY MOUTH DAILY   Quantity:  90 capsule   Refills:  2       pravastatin 40 MG tablet   Commonly known as:  PRAVACHOL   Used for:  Hyperlipidemia LDL goal <130        TAKE 1 TABLET BY MOUTH EVERY MORNING AND 1 EVERY OTHER EVENING   Quantity:  90 tablet   Refills:  3       psyllium 58.6 % Powd   Commonly known as:  METAMUCIL        Dose:  1 teaspoonful   Take 1 teaspoonful by mouth daily   Refills:  0       sennosides 8.6 MG tablet   Commonly known as:  SENOKOT        Dose:  2 tablet   Take 2 tablets by mouth  At Bedtime Reported on 5/17/2017   Refills:  0       tamoxifen 20 MG tablet   Commonly known as:  NOLVADEX   Used for:  Malignant neoplasm of right male breast, unspecified site of breast        Dose:  20 mg   Take 1 tablet (20 mg) by mouth daily   Quantity:  90 tablet   Refills:  0       tamsulosin 0.4 MG capsule   Commonly known as:  FLOMAX   Used for:  BPH (benign prostatic hyperplasia)        TAKE 1 CAPSULE BY MOUTH DAILY   Quantity:  90 capsule   Refills:  3       triamcinolone 0.5 % cream   Commonly known as:  KENALOG   Used for:  Rash        Apply sparingly to affected area three times daily.   Quantity:  30 g   Refills:  1       VENTOLIN  (90 BASE) MCG/ACT Inhaler   Used for:  SOB (shortness of breath)   Generic drug:  albuterol        USE 2 PUFFS EVERY 6 HOURS AS NEEDED FOR SHORTNESS OF BREATH/DYSPNEA   Quantity:  18 each   Refills:  1       VITAMIN C PO        Dose:  500 mg   Take 500 mg by mouth daily   Refills:  0                Protect others around you: Learn how to safely use, store and throw away your medicines at www.disposemymeds.org.         Follow-ups after your visit        Your next 10 appointments already scheduled     Aug 09, 2017  2:00 PM CDT   LAB with NA LAB   [facility] ([facility] )    [address]  [facility] [address]   [phone]            Aug 16, 2017  2:00 PM CDT   Return Visit with Radha Sales NP   [facility] ([facility] )    [address] [place] [address]   [phone]               Care Instructions        Further instructions from your care team       Home number on file [phone] (home)  Is it ok to leave a message at this number(s)? Yes    Dr. Ely completed your procedure on 7/14/2017.    Current Pain Level (0-10 Scale): 4/10  Post Pain Level (0-10):  0/10    Radiology Discharge instructions for Steroid Injection    Activity Level:     Do not do any heavy activity or  "exercise for 24 hours.   Do not drive for 4 hours after your injection.  Diet:   Return to your normal diet.  Medications:   If you have stopped taking your Aspirin, Coumadin/Warfarin, Ibuprofen, or any   other blood thinner for this procedure you may resume in the morning unless   your primary care provider has given you other instructions.    Diabetics may see an increase in blood sugar after steroid injections. If you are concerned about your blood sugar, please contact your family doctor.    Site Care:  Remove the bandage and bathe or shower the morning after the procedure.      This is a Pain Management procedure.  You will be contacted in two weeks for follow up.    Call your Primary Care Provider if you have the following (if your primary care provider is not available please seek emergency care):   Nausea with vomiting   Severe headache   Drowsiness or confusion   Redness or drainage at the injection or puncture site   Temperature over 101 degrees F   Other concerns   Worsening back pain   Stiff neck       Additional Information About Your Visit        [employer] Information     Page2Images lets you send messages to your doctor, view your test results, renew your prescriptions, schedule appointments and more. To sign up, go to [email or web address] . Click on \"Log in\" on the left side of the screen, which will take you to the Welcome page. Then click on \"Sign up Now\" on the right side of the page.     You will be asked to enter the access code listed below, as well as some personal information. Please follow the directions to create your username and password.     Your access code is: [email or web address]  Expires: 2017  5:11 PM     Your access code will  in 90 days. If you need help or a new code, please call your [place] clinic or [phone].        Care EveryWhere ID     This is your Care EveryWhere ID. This could be used by other organizations to access your [place] medical records  [id number]         " Primary Care Provider Office Phone # Fax #    Rubén Wren [phone]      Equal Access to Services     DEEP ROBERTSON : Hadii aad ku hadruvirginia Celestejack, wajoseda mckinleydonaldoha, lambert kamikaela palmer, eileen santanain hayaasaritha parterreynaldo rae lareinaldosaritha kurtis. So [facility] [phone].    ATENCIÓN: Si habla español, tiene a dc disposición servicios gratuitos de asistencia lingüística. Llame al [phone].    We comply with applicable federal civil rights laws and [place] laws. We do not discriminate on the basis of race, color, national origin, age, disability sex, sexual orientation or gender identity.            Thank you!     Thank you for choosing [facility] for your care. Our goal is always to provide you with excellent care. Hearing back from our patients is one way we can continue to improve our services. Please take a few minutes to complete the written survey that you may receive in the mail after you visit with us. Thank you!             Medication List: This is a list of all your medications and when to take them. Check marks below indicate your daily home schedule. Keep this list as a reference.      Medications           Morning Afternoon Evening Bedtime As Needed    ALPRAZolam 0.5 MG tablet   Commonly known as:  XANAX   TAKE 1 OR 2 TABLETS BY MOUTH AT BEDTIME AS NEEDED                                atenolol 50 MG tablet   Commonly known as:  TENORMIN   TAKE 1 TABLET BY MOUTH DAILY                                gabapentin 100 MG capsule   Commonly known as:  NEURONTIN   Take 1 capsule (100 mg) by mouth At Bedtime                                levothyroxine 75 MCG tablet   Commonly known as:  SYNTHROID/LEVOTHROID   TAKE 1 TABLET BY MOUTH DAILY                                MELATONIN PO   Take 5 mg by mouth At Bedtime                                omeprazole 20 MG CR capsule   Commonly known as:  priLOSEC   TAKE 1 CAPSULE BY MOUTH DAILY                                pravastatin 40 MG tablet   Commonly  known as:  PRAVACHOL   TAKE 1 TABLET BY MOUTH EVERY MORNING AND 1 EVERY OTHER EVENING                                psyllium 58.6 % Powd   Commonly known as:  METAMUCIL   Take 1 teaspoonful by mouth daily                                sennosides 8.6 MG tablet   Commonly known as:  SENOKOT   Take 2 tablets by mouth At Bedtime Reported on 5/17/2017                                tamoxifen 20 MG tablet   Commonly known as:  NOLVADEX   Take 1 tablet (20 mg) by mouth daily                                tamsulosin 0.4 MG capsule   Commonly known as:  FLOMAX   TAKE 1 CAPSULE BY MOUTH DAILY                                triamcinolone 0.5 % cream   Commonly known as:  KENALOG   Apply sparingly to affected area three times daily.                                VENTOLIN  (90 BASE) MCG/ACT Inhaler   USE 2 PUFFS EVERY 6 HOURS AS NEEDED FOR SHORTNESS OF BREATH/DYSPNEA   Generic drug:  albuterol                                VITAMIN C PO   Take 500 mg by mouth daily

## 2017-07-14 NOTE — IP AVS SNAPSHOT
[facility]    [address] [place] [address]    Phone:  [phone]    Fax:  [phone]                                       After Visit Summary   7/14/2017    Lio Daly    MRN: [id number]           After Visit Summary Signature Page     I have received my discharge instructions, and my questions have been answered. I have discussed any challenges I see with this plan with the nurse or doctor.    ..........................................................................................................................................  Patient/Patient Representative Signature      ..........................................................................................................................................  Patient Representative Print Name and Relationship to Patient    ..................................................               ................................................  Date                                            Time    ..........................................................................................................................................  Reviewed by Signature/Title    ...................................................              ..............................................  Date                                                            Time

## 2017-07-14 NOTE — PROGRESS NOTES
Procedure: Spine: LUMBAR    Radiologist:Dr. Ely    There were no complicationsand patient has no symptoms..      Sedation:None. Local Anesthestic used  No sedation    Complications: None    Condition: Stable    Post-procedure pain score as of today is: 0    See dictated procedure note for full details and results.    Lizzette Rosales

## 2017-07-14 NOTE — DISCHARGE INSTRUCTIONS
Home number on file [phone] (home)  Is it ok to leave a message at this number(s)? Yes    Dr. Ely completed your procedure on 7/14/2017.    Current Pain Level (0-10 Scale): 4/10  Post Pain Level (0-10):  0/10    Radiology Discharge instructions for Steroid Injection    Activity Level:     Do not do any heavy activity or exercise for 24 hours.   Do not drive for 4 hours after your injection.  Diet:   Return to your normal diet.  Medications:   If you have stopped taking your Aspirin, Coumadin/Warfarin, Ibuprofen, or any   other blood thinner for this procedure you may resume in the morning unless   your primary care provider has given you other instructions.    Diabetics may see an increase in blood sugar after steroid injections. If you are concerned about your blood sugar, please contact your family doctor.    Site Care:  Remove the bandage and bathe or shower the morning after the procedure.      This is a Pain Management procedure.  You will be contacted in two weeks for follow up.    Call your Primary Care Provider if you have the following (if your primary care provider is not available please seek emergency care):   Nausea with vomiting   Severe headache   Drowsiness or confusion   Redness or drainage at the injection or puncture site   Temperature over 101 degrees F   Other concerns   Worsening back pain   Stiff neck

## 2017-07-14 NOTE — PROGRESS NOTES
Fluoro time for this case was 33 Seconds, less than 5 min  Was patient held? NO  If yes, by whom?

## 2017-07-17 ENCOUNTER — TELEPHONE (OUTPATIENT)
Dept: ONCOLOGY | Facility: OTHER | Age: 82
End: 2017-07-17

## 2017-07-17 NOTE — TELEPHONE ENCOUNTER
"Message received from patient that he has lymphedema in right arm and right shoulder hurts a lot. Updated  and would like patient see sooner. Called patient and updated. \"Not at this time, I have ice on it now and will take it easy, and if it gets worse I'll call\".    Janina Au    "

## 2017-07-20 ENCOUNTER — TELEPHONE (OUTPATIENT)
Dept: FAMILY MEDICINE | Facility: OTHER | Age: 82
End: 2017-07-20

## 2017-07-20 DIAGNOSIS — F41.9 ANXIETY: ICD-10-CM

## 2017-07-20 NOTE — TELEPHONE ENCOUNTER
Sorry, cannot use this in combination with his xanax especially.  Advise referral to PT if he would like, and using IBU/aleve and tylenol at this point.  Evaluation if necessary as well.  Thanks.me

## 2017-07-20 NOTE — TELEPHONE ENCOUNTER
I called the pt he is still taking Xanax. He takes 2 at HS. Pt took 2 Tylenol this am and takes 2 at HS. Pt is icing. Pt is offered an appt at 1:30pm, arriving at 1:15 pm tomorrow. He is aware. Please put in the schedule.

## 2017-07-21 NOTE — TELEPHONE ENCOUNTER
xanax      Last Written Prescription Date: 6/22/17  Last Fill Quantity: 60,  # refills: 0   Last Office Visit with G, P or [facility] prescribing provider: 7/10/17                                         Next 5 appointments (look out 90 days)     Jul 25, 2017  2:30 PM CDT   (Arrive by 2:15 PM)   Return Visit with Radha Sales NP   [facility] [place]    [address] [place] [address]   [phone]            Aug 16, 2017  2:00 PM CDT   Return Visit with Radha Sales NP   [facility] ([facility] [place]    [address] [place] [address]   [phone]

## 2017-07-24 DIAGNOSIS — K21.9 GASTROESOPHAGEAL REFLUX DISEASE, ESOPHAGITIS PRESENCE NOT SPECIFIED: ICD-10-CM

## 2017-07-24 RX ORDER — ALPRAZOLAM 0.5 MG
TABLET ORAL
Qty: 60 TABLET | Refills: 0 | Status: SHIPPED | OUTPATIENT
Start: 2017-07-24 | End: 2017-08-21

## 2017-07-25 ENCOUNTER — ONCOLOGY VISIT (OUTPATIENT)
Dept: ONCOLOGY | Facility: OTHER | Age: 82
End: 2017-07-25
Attending: NURSE PRACTITIONER
Payer: COMMERCIAL

## 2017-07-25 VITALS
BODY MASS INDEX: 25.7 KG/M2 | DIASTOLIC BLOOD PRESSURE: 83 MMHG | OXYGEN SATURATION: 93 % | WEIGHT: 169.6 LBS | HEIGHT: 68 IN | TEMPERATURE: 98 F | HEART RATE: 81 BPM | SYSTOLIC BLOOD PRESSURE: 132 MMHG

## 2017-07-25 DIAGNOSIS — G89.29 CHRONIC RIGHT SHOULDER PAIN: Primary | ICD-10-CM

## 2017-07-25 DIAGNOSIS — C50.921 MALIGNANT NEOPLASM OF RIGHT MALE BREAST, UNSPECIFIED ESTROGEN RECEPTOR STATUS, UNSPECIFIED SITE OF BREAST (H): ICD-10-CM

## 2017-07-25 DIAGNOSIS — M25.511 CHRONIC RIGHT SHOULDER PAIN: Primary | ICD-10-CM

## 2017-07-25 PROCEDURE — 99213 OFFICE O/P EST LOW 20 MIN: CPT | Performed by: NURSE PRACTITIONER

## 2017-07-25 PROCEDURE — 73030 X-RAY EXAM OF SHOULDER: CPT | Mod: TC,RT

## 2017-07-25 PROCEDURE — 99212 OFFICE O/P EST SF 10 MIN: CPT | Mod: 25

## 2017-07-25 RX ORDER — HYDROCODONE BITARTRATE AND ACETAMINOPHEN 5; 325 MG/1; MG/1
1 TABLET ORAL EVERY 6 HOURS PRN
Qty: 20 TABLET | Refills: 0 | Status: SHIPPED | OUTPATIENT
Start: 2017-07-25 | End: 2017-08-07

## 2017-07-25 ASSESSMENT — ANXIETY QUESTIONNAIRES
GAD7 TOTAL SCORE: 0
2. NOT BEING ABLE TO STOP OR CONTROL WORRYING: NOT AT ALL
3. WORRYING TOO MUCH ABOUT DIFFERENT THINGS: NOT AT ALL
IF YOU CHECKED OFF ANY PROBLEMS ON THIS QUESTIONNAIRE, HOW DIFFICULT HAVE THESE PROBLEMS MADE IT FOR YOU TO DO YOUR WORK, TAKE CARE OF THINGS AT HOME, OR GET ALONG WITH OTHER PEOPLE: NOT DIFFICULT AT ALL
5. BEING SO RESTLESS THAT IT IS HARD TO SIT STILL: NOT AT ALL
6. BECOMING EASILY ANNOYED OR IRRITABLE: NOT AT ALL
1. FEELING NERVOUS, ANXIOUS, OR ON EDGE: NOT AT ALL
7. FEELING AFRAID AS IF SOMETHING AWFUL MIGHT HAPPEN: NOT AT ALL

## 2017-07-25 ASSESSMENT — PATIENT HEALTH QUESTIONNAIRE - PHQ9: 5. POOR APPETITE OR OVEREATING: NOT AT ALL

## 2017-07-25 ASSESSMENT — PAIN SCALES - GENERAL: PAINLEVEL: MODERATE PAIN (4)

## 2017-07-25 NOTE — PROGRESS NOTES
Oncology Visit:  July 25, 2017    Reason for Visit:  Patient presents with:  Musculoskeletal Problem: complaints of right shoulder and neck pain.     Nursing Note and documentation reviewed: yes    HPI:  This is an 83-year-old male patient who presents to the oncology/hematology clinic today in a nonroutine fashion.  Patient was diagnosed with right breast cancer in July 2015 and is currently on tamoxifen 20 mg daily.  He is due for regular followup in August 2017.    Patient comes in today questioning whether or not he can receive a steroid injection in the right shoulder and possibly neck related to increased pain.  Note patient has had a right total mastectomy with lymph node node dissection and does experience lymphedema at this time.  Patient has had much difficulty with the right shoulder over the past couple years and MRI was completed in October 2015 showing abnormalities of the rotator cuff.  He states he has had increased pain over the past couple weeks and is using only Tylenol and states this has not helped the pain.  Patient is now using a sling stating that pain decreases with less movement.  He does also have some complaints of pain in the posterior neck and states he has not undergone evaluation for this.  Denies any pain in the right upper extremity, denies any numbness or tingling.  Note, he does tell me he he threw out his back a couple months ago and has received 3 injections to date which has improved the low back pain.    Past Medical History:   Diagnosis Date     Dysphagia 06/28/2004     HTN (hypertension)      Need for prophylactic vaccination and inoculation 12/10/2001     Need for prophylactic vaccination and inoculation, 11/06/2003     Other and unspecified hyperlipidemia 05/01/2001     Prediabetes      Shortness of breath 03/28/2006     Shoulder pain 06/15/2012     Unspecified essential hypertension 05/18/2000       Past Surgical History:   Procedure Laterality Date     CARDIAC SURGERY       angiogram 1992 [facility]     COLONOSCOPY      1998     GENITOURINARY SURGERY      prostate bx [place]     GENITOURINARY SURGERY      Prostate bx [facility]     GI SURGERY      EGD     GI SURGERY      Pt has EGD with removal of food from esophagus     MASTECTOMY SIMPLE, SENTINEL NODE, COMBINED Right 7/27/2015    Procedure: COMBINED MASTECTOMY SIMPLE, SENTINEL NODE;  Surgeon: Aliyah Garsia MD;  Location: [facility]     ORTHOPEDIC SURGERY      bilateral knee replacements       Family History   Problem Relation Age of Onset     Cancer - colorectal Mother      Eye Disorder Mother      cataracts     Musculoskeletal Disorder Mother      CTS     CANCER Father      lung cancer     Genitourinary Problems Father      prostate problems     C.A.D. Brother      CABG     Eye Disorder Brother      cataracts     Breast Cancer Other      Ovarian Cancer Other      Pancreatic Cancer Other        Social History     Social History     Marital status:      Spouse name: Marisa     Number of children: N/A     Years of education: N/A     Occupational History     Not on file.     Social History Main Topics     Smoking status: Former Smoker     Types: Cigarettes     Smokeless tobacco: Never Used      Comment: tried to quit-yes     Alcohol use No      Comment: former     Drug use: No     Sexual activity: Not on file     Other Topics Concern     Caffeine Concern Yes     coffee, 2 cups daily     Parent/Sibling W/ Cabg, Mi Or Angioplasty Before 65f 55m? No     Social History Narrative       Current Outpatient Prescriptions   Medication     ALPRAZolam (XANAX) 0.5 MG tablet     MELATONIN PO     atenolol (TENORMIN) 50 MG tablet     tamsulosin (FLOMAX) 0.4 MG capsule     pravastatin (PRAVACHOL) 40 MG tablet     omeprazole (PRILOSEC) 20 MG CR capsule     levothyroxine (SYNTHROID/LEVOTHROID) 75 MCG tablet     tamoxifen (NOLVADEX) 20 MG tablet     triamcinolone (KENALOG) 0.5 % cream     sennosides (SENOKOT) 8.6 MG tablet     psyllium (METAMUCIL)  "58.6 % POWD     Ascorbic Acid (VITAMIN C PO)     VENTOLIN  (90 BASE) MCG/ACT inhaler     gabapentin (NEURONTIN) 100 MG capsule     No current facility-administered medications for this visit.         No Known Allergies    Physical Exam:  /83 (BP Location: Left arm, Patient Position: Sitting, Cuff Size: Adult Regular)  Pulse 81  Temp 98  F (36.7  C) (Tympanic)  Ht 1.715 m (5' 7.5\")  Wt 76.9 kg (169 lb 9.6 oz)  SpO2 93%  BMI 26.17 kg/m2    GENERAL APPEARANCE: Healthy, alert and in no acute distress.  HEENT: Normocephalic, Sclerae anicteric. Oropharynx without ulcers, lesions, or thrush.  NECK: Supple. No asymmetry or masses, no thyromegaly.  LYMPHATICS: No palpable cervical, supraclavicular, axillary, or inguinal nodes   RESP: Lungs clear to auscultation bilaterally, respirations regular and easy  CARDIOVASCULAR: Regular rate and rhythm. Normal S1, S2; no murmur, gallop, or rub.  ABDOMEN: Soft, nontender. Bowel sounds auscultated all 4 quadrants. No palpable organomegaly or masses.  MUSCULOSKELETAL: Extremities without gross deformities noted. No edema of bilateral lower extremities.  NEURO: Alert and oriented x 3.  Gait steady.  PSYCHIATRIC: Mentation and affect appear normal.  Mood appropriate.    Laboratory:  None for today      Imaging Studies:  None for today      ASSESSMENT/PLAN:    #1 Right shoulder pain: I did recommend against having an injection in the right shoulder related to mastectomy and lymph node dissection and also current lymphedema.  I did explain the infection risk.  I recommended ibuprofen 400-600 mg 3 times a day and I did also given him a prescription for Norco 5/325 #20 to take one tablet every 6 hours as needed for pain.  I did explain to patient that if he is feeling lightheaded or feels this medication is too strong he may cut the pills in half.  In addition, patient does take Xanax at night for sleep and I did recommend not taking the norco at night with the Xanax.  " Patient will try ibuprofen first and use a hydrocodone only as needed.  I did recommend that he followup with his PCP for further management of the right shoulder pain.  We will obtain a right shoulder x-ray today to evaluate for any changes in the bone appearance.  Related to previous breast cancer history.  He is aware will call him with that result.    #2 Breast cancer: He will followup in August as planned.    I encouraged patient to call with any questions or concerns.      Radha LIM-BC

## 2017-07-25 NOTE — MR AVS SNAPSHOT
After Visit Summary   7/25/2017    Lio Daly    MRN: [id number]           Patient Information     Date Of Birth          5/28/1934        Visit Information        Provider Department      7/25/2017 2:30 PM Radha Sales NP [facility] [place]        Today's Diagnoses     Malignant neoplasm of right male breast, unspecified estrogen receptor status, unspecified site of breast (H)    -  1    Chronic right shoulder pain          Care Instructions    We would like to see you back as planned in August. We will call you with the shoulder xray results.  Please fill the prescription for hydrocodone and use sparingly.  Do not take at the same time as the Xanax.  You may try ibuprofen 400-600mg three times a day.  When you are in need of a refill on your Tamoxifen, please call your pharmacy and they will send us a request. If you have any questions please call [phone]  Other instructions:  Follow up with Dr. Wren for further management of shoulder pain            Follow-ups after your visit        Your next 10 appointments already scheduled     Aug 09, 2017  2:00 PM CDT   LAB with NA LAB   [facility] ([facility] )    [address] [facility] [address]   [phone]            Aug 16, 2017  2:00 PM CDT   Return Visit with Radha Sales NP   [facility] ([facility] )    [address] [facility] [id number] [phone]              Who to contact     If you have questions or need follow up information about today's clinic visit or your schedule please contact [facility] directly at [phone].  Normal or non-critical lab and imaging results will be communicated to you by MyChart, letter or phone within 4 business days after the clinic has received the results. If you do not hear from us within 7 days, please contact the clinic through MyChart or phone. If you have a critical or  "abnormal lab result, we will notify you by phone as soon as possible.  Submit refill requests through [employer] or call your pharmacy and they will forward the refill request to us. Please allow 3 business days for your refill to be completed.          Additional Information About Your Visit        [email or web address] Information     [employer] lets you send messages to your doctor, view your test results, renew your prescriptions, schedule appointments and more. To sign up, go to [email or web address] . Click on \"Log in\" on the left side of the screen, which will take you to the Welcome page. Then click on \"Sign up Now\" on the right side of the page.     You will be asked to enter the access code listed below, as well as some personal information. Please follow the directions to create your username and password.     Your access code is: [email or web address]  Expires: 2017  5:11 PM     Your access code will  in 90 days. If you need help or a new code, please call your [facility] clinic or [phone].        Care EveryWhere ID     This is your Care EveryWhere ID. This could be used by other organizations to access your [facility] medical records  [id number]        Your Vitals Were     Pulse Temperature Height Pulse Oximetry BMI (Body Mass Index)       81 98  F (36.7  C) (Tympanic) 1.715 m (5' 7.5\") 93% 26.17 kg/m2        Blood Pressure from Last 3 Encounters:   17 132/83   07/10/17 110/70   17 124/58    Weight from Last 3 Encounters:   17 76.9 kg (169 lb 9.6 oz)   07/10/17 74.8 kg (165 lb)   17 73.9 kg (163 lb)                 Today's Medication Changes          These changes are accurate as of: 17  3:33 PM.  If you have any questions, ask your nurse or doctor.               Start taking these medicines.        Dose/Directions    HYDROcodone-acetaminophen 5-325 MG per tablet   Commonly known as:  NORCO   Used for:  Chronic right shoulder pain, Malignant neoplasm of right male breast, unspecified " estrogen receptor status, unspecified site of breast (H)   Started by:  Radha Sales NP        Dose:  1 tablet   Take 1 tablet by mouth every 6 hours as needed for moderate to severe pain   Quantity:  20 tablet   Refills:  0         These medicines have changed or have updated prescriptions.        Dose/Directions    tamsulosin 0.4 MG capsule   Commonly known as:  FLOMAX   This may have changed:  See the new instructions.   Used for:  BPH (benign prostatic hyperplasia)        TAKE 1 CAPSULE BY MOUTH DAILY   Quantity:  90 capsule   Refills:  3            Where to get your medicines      Some of these will need a paper prescription and others can be bought over the counter.  Ask your nurse if you have questions.     Bring a paper prescription for each of these medications     HYDROcodone-acetaminophen 5-325 MG per tablet                Primary Care Provider Office Phone # Fax #    Rubén Wren [phone]       [address] [address]        Equal Access to Services     [facility] : Hadii aad ku hadasho Soomaali, waaxda luqadaha, qaybta kaalmada adeegyada, waxay idiin hayaan harry snell . So [facility] [phone].    ATENCIÓN: Si habla español, tiene a dc disposición servicios gratuitos de asistencia lingüística. [facility] [phone].    We comply with applicable federal civil rights laws and [place] laws. We do not discriminate on the basis of race, color, national origin, age, disability sex, sexual orientation or gender identity.            Thank you!     Thank you for choosing [facility]  [facility] your [facility]. Our goal is always to provide you with excellent care. Hearing back from our patients is one way we can continue to improve our services. Please take a few minutes to complete the written survey that you may receive in the mail after your visit with us. Thank you!             Your Updated Medication List - Protect others  around you: Learn how to safely use, store and throw away your medicines at www.disposemymeds.org.          This list is accurate as of: 7/25/17  3:33 PM.  Always use your most recent med list.                   Brand Name Dispense Instructions for use Diagnosis    ALPRAZolam 0.5 MG tablet    XANAX    60 tablet    TAKE 1 OR 2 TABLETS BY MOUTH AT BEDTIME AS NEEDED    Anxiety       atenolol 50 MG tablet    TENORMIN    90 tablet    TAKE 1 TABLET BY MOUTH DAILY    Benign essential hypertension       gabapentin 100 MG capsule    NEURONTIN    30 capsule    Take 1 capsule (100 mg) by mouth At Bedtime    Spinal stenosis of lumbar region with neurogenic claudication       HYDROcodone-acetaminophen 5-325 MG per tablet    NORCO    20 tablet    Take 1 tablet by mouth every 6 hours as needed for moderate to severe pain    Chronic right shoulder pain, Malignant neoplasm of right male breast, unspecified estrogen receptor status, unspecified site of breast (H)       levothyroxine 75 MCG tablet    SYNTHROID/LEVOTHROID    30 tablet    TAKE 1 TABLET BY MOUTH DAILY    Hypothyroidism       MELATONIN PO      Take 5 mg by mouth At Bedtime        omeprazole 20 MG CR capsule    priLOSEC    90 capsule    TAKE 1 CAPSULE BY MOUTH DAILY    Gastroesophageal reflux disease, esophagitis presence not specified       pravastatin 40 MG tablet    PRAVACHOL    90 tablet    TAKE 1 TABLET BY MOUTH EVERY MORNING AND 1 EVERY OTHER EVENING    Hyperlipidemia LDL goal <130       psyllium 58.6 % Powd    METAMUCIL     Take 1 teaspoonful by mouth daily        sennosides 8.6 MG tablet    SENOKOT     Take 2 tablets by mouth At Bedtime Reported on 5/17/2017        tamoxifen 20 MG tablet    NOLVADEX    90 tablet    Take 1 tablet (20 mg) by mouth daily    Malignant neoplasm of right male breast, unspecified site of breast       tamsulosin 0.4 MG capsule    FLOMAX    90 capsule    TAKE 1 CAPSULE BY MOUTH DAILY    BPH (benign prostatic hyperplasia)       triamcinolone  0.5 % cream    KENALOG    30 g    Apply sparingly to affected area three times daily.    Rash       VENTOLIN  (90 BASE) MCG/ACT Inhaler   Generic drug:  albuterol     18 each    USE 2 PUFFS EVERY 6 HOURS AS NEEDED FOR SHORTNESS OF BREATH/DYSPNEA    SOB (shortness of breath)       VITAMIN C PO      Take 500 mg by mouth daily

## 2017-07-25 NOTE — PATIENT INSTRUCTIONS
We would like to see you back as planned in August. We will call you with the shoulder xray results.  Please fill the prescription for hydrocodone and use sparingly.  Do not take at the same time as the Xanax.  You may try ibuprofen 400-600mg three times a day.  When you are in need of a refill on your Tamoxifen, please call your pharmacy and they will send us a request. If you have any questions please call [phone]  Other instructions:  Follow up with Dr. Wren for further management of shoulder pain

## 2017-07-25 NOTE — NURSING NOTE
"Chief Complaint   Patient presents with     Musculoskeletal Problem     complaints of right shoulder and neck pain.       Initial /83 (BP Location: Left arm, Patient Position: Sitting, Cuff Size: Adult Regular)  Pulse 81  Temp 98  F (36.7  C) (Tympanic)  Ht 1.715 m (5' 7.5\")  Wt 76.9 kg (169 lb 9.6 oz)  SpO2 93%  BMI 26.17 kg/m2 Estimated body mass index is 26.17 kg/(m^2) as calculated from the following:    Height as of this encounter: 1.715 m (5' 7.5\").    Weight as of this encounter: 76.9 kg (169 lb 9.6 oz).  Medication Reconciliation: complete   Charlene Diaz LPN      "

## 2017-07-26 ASSESSMENT — PATIENT HEALTH QUESTIONNAIRE - PHQ9: SUM OF ALL RESPONSES TO PHQ QUESTIONS 1-9: 2

## 2017-07-26 ASSESSMENT — ANXIETY QUESTIONNAIRES: GAD7 TOTAL SCORE: 0

## 2017-07-28 ENCOUNTER — TELEPHONE (OUTPATIENT)
Dept: INTERVENTIONAL RADIOLOGY/VASCULAR | Facility: HOSPITAL | Age: 82
End: 2017-07-28

## 2017-07-28 NOTE — TELEPHONE ENCOUNTER
Telephone Follow Up Pain Management Injections    On the date of 06/08/2017, patient reported a post-procedure pain scale of 5/10 in previous injection (prior to most recent), which is 80% relief.  .    Called patient to check post-procedural status.     There were no complications and patient has tingling in feet and some shakiness with walking.   Patient reports pain scale of 2/10 and 50% relief.    New pains? Pain the base of neck/ spine are where it feels like a lump there.     Patient would like to pursue another injection. If you would think that would help him.     I responded to the patient's questions/concerns. Patient is going to start Neurontin today per instructions of Dr Wren for the next three weeks.   Lashawn Manuel

## 2017-07-31 ENCOUNTER — TELEPHONE (OUTPATIENT)
Dept: ONCOLOGY | Facility: OTHER | Age: 82
End: 2017-07-31

## 2017-07-31 NOTE — TELEPHONE ENCOUNTER
Called and updated patient on shoulder Xray and to follow up with . Patient verbalized understanding.     Janina Au

## 2017-08-03 DIAGNOSIS — M54.50 LOWER BACK PAIN: Primary | ICD-10-CM

## 2017-08-03 DIAGNOSIS — M48.00 CENTRAL STENOSIS OF SPINAL CANAL: ICD-10-CM

## 2017-08-07 DIAGNOSIS — G89.29 CHRONIC RIGHT SHOULDER PAIN: ICD-10-CM

## 2017-08-07 DIAGNOSIS — M25.511 CHRONIC RIGHT SHOULDER PAIN: ICD-10-CM

## 2017-08-07 DIAGNOSIS — C50.921 MALIGNANT NEOPLASM OF RIGHT MALE BREAST, UNSPECIFIED ESTROGEN RECEPTOR STATUS, UNSPECIFIED SITE OF BREAST (H): ICD-10-CM

## 2017-08-07 RX ORDER — HYDROCODONE BITARTRATE AND ACETAMINOPHEN 5; 325 MG/1; MG/1
1 TABLET ORAL EVERY 6 HOURS PRN
Qty: 30 TABLET | Refills: 0 | Status: SHIPPED | OUTPATIENT
Start: 2017-08-07 | End: 2018-02-01

## 2017-08-07 NOTE — NURSING NOTE
NORCO      Last Written Prescription Date:  07/25/2017  Last Fill Quantity: 20,   # refills: 0  Last Office Visit with [facility], [facility] or  Health prescribing provider: 07/10/17  Future Office visit:    Next 5 appointments (look out 90 days)     Aug 16, 2017  2:00 PM CDT   Return Visit with Radha Sales NP   [facility]    [address] [place] [address]   [phone]                   Routing refill request to provider for review/approval because:  Medication is reported/historical

## 2017-08-08 ENCOUNTER — TELEPHONE (OUTPATIENT)
Dept: ONCOLOGY | Facility: OTHER | Age: 82
End: 2017-08-08

## 2017-08-09 DIAGNOSIS — C50.921 MALIGNANT NEOPLASM OF RIGHT MALE BREAST (H): ICD-10-CM

## 2017-08-09 LAB
ALBUMIN SERPL-MCNC: 3.5 G/DL (ref 3.4–5)
ALP SERPL-CCNC: 64 U/L (ref 40–150)
ALT SERPL W P-5'-P-CCNC: 22 U/L (ref 0–70)
ANION GAP SERPL CALCULATED.3IONS-SCNC: 8 MMOL/L (ref 3–14)
AST SERPL W P-5'-P-CCNC: 24 U/L (ref 0–45)
BASOPHILS # BLD AUTO: 0 10E9/L (ref 0–0.2)
BASOPHILS NFR BLD AUTO: 0.5 %
BILIRUB SERPL-MCNC: 0.3 MG/DL (ref 0.2–1.3)
BUN SERPL-MCNC: 23 MG/DL (ref 7–30)
CALCIUM SERPL-MCNC: 9 MG/DL (ref 8.5–10.1)
CHLORIDE SERPL-SCNC: 110 MMOL/L (ref 94–109)
CO2 SERPL-SCNC: 22 MMOL/L (ref 20–32)
CREAT SERPL-MCNC: 0.94 MG/DL (ref 0.66–1.25)
DIFFERENTIAL METHOD BLD: ABNORMAL
EOSINOPHIL # BLD AUTO: 0.4 10E9/L (ref 0–0.7)
EOSINOPHIL NFR BLD AUTO: 4.9 %
ERYTHROCYTE [DISTWIDTH] IN BLOOD BY AUTOMATED COUNT: 14 % (ref 10–15)
GFR SERPL CREATININE-BSD FRML MDRD: 77 ML/MIN/1.7M2
GLUCOSE SERPL-MCNC: 108 MG/DL (ref 70–99)
HCT VFR BLD AUTO: 36.3 % (ref 40–53)
HGB BLD-MCNC: 12 G/DL (ref 13.3–17.7)
LDH SERPL L TO P-CCNC: 207 U/L (ref 85–227)
LYMPHOCYTES # BLD AUTO: 2.2 10E9/L (ref 0.8–5.3)
LYMPHOCYTES NFR BLD AUTO: 25.2 %
MCH RBC QN AUTO: 31.2 PG (ref 26.5–33)
MCHC RBC AUTO-ENTMCNC: 33.1 G/DL (ref 31.5–36.5)
MCV RBC AUTO: 94 FL (ref 78–100)
MONOCYTES # BLD AUTO: 0.8 10E9/L (ref 0–1.3)
MONOCYTES NFR BLD AUTO: 9.6 %
NEUTROPHILS # BLD AUTO: 5.2 10E9/L (ref 1.6–8.3)
NEUTROPHILS NFR BLD AUTO: 59.8 %
PLATELET # BLD AUTO: 271 10E9/L (ref 150–450)
POTASSIUM SERPL-SCNC: 4.7 MMOL/L (ref 3.4–5.3)
PROT SERPL-MCNC: 6.9 G/DL (ref 6.8–8.8)
RBC # BLD AUTO: 3.85 10E12/L (ref 4.4–5.9)
SODIUM SERPL-SCNC: 140 MMOL/L (ref 133–144)
WBC # BLD AUTO: 8.7 10E9/L (ref 4–11)

## 2017-08-09 PROCEDURE — 99000 SPECIMEN HANDLING OFFICE-LAB: CPT | Performed by: NURSE PRACTITIONER

## 2017-08-09 PROCEDURE — 85025 COMPLETE CBC W/AUTO DIFF WBC: CPT | Mod: ZL | Performed by: NURSE PRACTITIONER

## 2017-08-09 PROCEDURE — 36415 COLL VENOUS BLD VENIPUNCTURE: CPT | Mod: ZL | Performed by: NURSE PRACTITIONER

## 2017-08-09 PROCEDURE — 83615 LACTATE (LD) (LDH) ENZYME: CPT | Mod: ZL | Performed by: NURSE PRACTITIONER

## 2017-08-09 PROCEDURE — 80053 COMPREHEN METABOLIC PANEL: CPT | Mod: ZL | Performed by: NURSE PRACTITIONER

## 2017-08-09 PROCEDURE — 86300 IMMUNOASSAY TUMOR CA 15-3: CPT | Mod: ZL | Performed by: NURSE PRACTITIONER

## 2017-08-10 ENCOUNTER — TELEPHONE (OUTPATIENT)
Dept: FAMILY MEDICINE | Facility: OTHER | Age: 82
End: 2017-08-10

## 2017-08-10 LAB — CANCER AG27-29 SERPL-ACNC: 31 U/ML

## 2017-08-10 NOTE — TELEPHONE ENCOUNTER
2:54 PM    Reason for Call: OVERBOOK    Patient is having the following symptoms: Follow up/back/dicuss back surgery  for ongoing days.    The patient is requesting an appointment for Aug 21 or 22 with Dr Wren.    Was an appointment offered for this call? Yes    Preferred method for responding to this message: Telephone Call    If we cannot reach you directly, may we leave a detailed response at the number you provided? Yes    Can this message wait until your PCP/provider returns, if unavailable today? Not applicable    Shawna Calhoun

## 2017-08-16 ENCOUNTER — ONCOLOGY VISIT (OUTPATIENT)
Dept: ONCOLOGY | Facility: OTHER | Age: 82
End: 2017-08-16
Attending: NURSE PRACTITIONER
Payer: COMMERCIAL

## 2017-08-16 VITALS
SYSTOLIC BLOOD PRESSURE: 141 MMHG | BODY MASS INDEX: 25.61 KG/M2 | OXYGEN SATURATION: 96 % | DIASTOLIC BLOOD PRESSURE: 74 MMHG | RESPIRATION RATE: 20 BRPM | HEIGHT: 68 IN | TEMPERATURE: 98 F | WEIGHT: 169 LBS | HEART RATE: 70 BPM

## 2017-08-16 DIAGNOSIS — M25.512 LEFT SHOULDER PAIN, UNSPECIFIED CHRONICITY: ICD-10-CM

## 2017-08-16 DIAGNOSIS — C50.911 PRIMARY BREAST MALIGNANCY, RIGHT (H): Primary | ICD-10-CM

## 2017-08-16 PROCEDURE — 99213 OFFICE O/P EST LOW 20 MIN: CPT | Performed by: NURSE PRACTITIONER

## 2017-08-16 PROCEDURE — 99212 OFFICE O/P EST SF 10 MIN: CPT

## 2017-08-16 RX ORDER — TRAMADOL HYDROCHLORIDE 50 MG/1
50 TABLET ORAL EVERY 6 HOURS PRN
Qty: 10 TABLET | Refills: 0 | Status: SHIPPED | OUTPATIENT
Start: 2017-08-16 | End: 2018-02-01

## 2017-08-16 ASSESSMENT — PAIN SCALES - GENERAL: PAINLEVEL: MODERATE PAIN (4)

## 2017-08-16 NOTE — NURSING NOTE
"Chief Complaint   Patient presents with     Follow Up For     Malignant Neoplasm of the right breast.       Initial /74 (BP Location: Left arm, Patient Position: Chair, Cuff Size: Adult Regular)  Pulse 70  Temp 98  F (36.7  C) (Tympanic)  Resp 20  Ht 1.727 m (5' 8\")  Wt 76.7 kg (169 lb)  SpO2 96%  BMI 25.7 kg/m2 Estimated body mass index is 25.7 kg/(m^2) as calculated from the following:    Height as of this encounter: 1.727 m (5' 8\").    Weight as of this encounter: 76.7 kg (169 lb).  Medication Reconciliation: complete   AAKASH CAMARILLO      "

## 2017-08-16 NOTE — PATIENT INSTRUCTIONS
We would like to see you back in 4 months. Please come 1week(s) prior for lab work.  When you are in need of a refill of your medications, please call your pharmacy and they will send us the request. If you have any questions please call [phone]

## 2017-08-16 NOTE — PROGRESS NOTES
Oncology Follow-up Visit:  2017    Reason for Visit:  Patient presents with:  Follow Up For: Malignant Neoplasm of the right breast.     Nursing Note and documentation reviewed: yes    HPI: This is an 83-year-old male patient who presents to the oncology/hematology clinic today in follow up of right breast cancer.  Patient underwent a right mastectomy with axillary node dissection in 2015. He was placed on 20 mg of tamoxifen and remains on this. He presents to the clinic today accompanied by his daughter and essentially without any new complaints.  He does continue to have difficulty and pain with the right shoulder but tells me today he did not take any of the recent prescription of hydrocodone that I had given him.  He states this medication causes extreme constipation so he has been using Tylenol and ibuprofen only.  He does ask if there is something else that can be given that may not cause such constipation.  Denies any issues with new lumps or masses to the chest wall or to the opposite breast.  He states he continues to grieve the loss of his wife as it has now been one year since she .  He does feel he is doing better from the standpoint however.  He is tolerating the tamoxifen without difficulty.    Oncologic History: Patient had presented with complaints of right nipple irritation over a six-month period and was noted to have a mass in the right breast. Mammogram was completed followed by biopsy and pathology showed an invasive carcinoma. On 2015, he underwent a right modified radical mastectomy with axillary node dissection by Dr. Garsia. 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 DCIS was present, grade 2 with expansive comedonecrosis. Node dissection revealed 1/17 nodes revealing micrometastasis. Tumor was ER/NE positive, HER-2/tohmas negative. Patient was staged  pathologic T1c N1mi M0.      At consultation, patient was not interested in adjuvant chemotherapy and was placed on tamoxifen 20 mg daily.     BRCA 2+      Current Chemo Regime/TX: Tamoxifen 20mg daily  Current Cycle: n/a  # of completed cycles: n/a      Previous treatment:  n/a    Past Medical History:   Diagnosis Date     Dysphagia 06/28/2004     HTN (hypertension)      Need for prophylactic vaccination and inoculation 12/10/2001     Need for prophylactic vaccination and inoculation, 11/06/2003     Other and unspecified hyperlipidemia 05/01/2001     Prediabetes      Shortness of breath 03/28/2006     Shoulder pain 06/15/2012     Unspecified essential hypertension 05/18/2000       Past Surgical History:   Procedure Laterality Date     CARDIAC SURGERY      angiogram 1992 [facility]     COLONOSCOPY      1998     GENITOURINARY SURGERY      prostate bx [facility]     GENITOURINARY SURGERY      Prostate bx [facility]     GI SURGERY      EGD     GI SURGERY      Pt has EGD with removal of food from esophagus     MASTECTOMY SIMPLE, SENTINEL NODE, COMBINED Right 7/27/2015    Procedure: COMBINED MASTECTOMY SIMPLE, SENTINEL NODE;  Surgeon: Aliyah Garsia MD;  Location: [facility]     ORTHOPEDIC SURGERY      bilateral knee replacements       Family History   Problem Relation Age of Onset     Cancer - colorectal Mother      Eye Disorder Mother      cataracts     Musculoskeletal Disorder Mother      CTS     CANCER Father      lung cancer     Genitourinary Problems Father      prostate problems     C.A.D. Brother      CABG     Eye Disorder Brother      cataracts     Breast Cancer Other      Ovarian Cancer Other      Pancreatic Cancer Other        Social History     Social History     Marital status:      Spouse name: Marisa     Number of children: N/A     Years of education: N/A     Occupational History     Not on file.     Social History Main Topics     Smoking status: Former Smoker     Types: Cigarettes     Smokeless tobacco:  Never Used      Comment: tried to quit-yes     Alcohol use No      Comment: former     Drug use: No     Sexual activity: Not on file     Other Topics Concern     Caffeine Concern Yes     coffee, 2 cups daily     Parent/Sibling W/ Cabg, Mi Or Angioplasty Before 65f 55m? No     Social History Narrative       Current Outpatient Prescriptions   Medication     traMADol (ULTRAM) 50 MG tablet     HYDROcodone-acetaminophen (NORCO) 5-325 MG per tablet     ALPRAZolam (XANAX) 0.5 MG tablet     MELATONIN PO     gabapentin (NEURONTIN) 100 MG capsule     atenolol (TENORMIN) 50 MG tablet     tamsulosin (FLOMAX) 0.4 MG capsule     pravastatin (PRAVACHOL) 40 MG tablet     omeprazole (PRILOSEC) 20 MG CR capsule     levothyroxine (SYNTHROID/LEVOTHROID) 75 MCG tablet     tamoxifen (NOLVADEX) 20 MG tablet     triamcinolone (KENALOG) 0.5 % cream     sennosides (SENOKOT) 8.6 MG tablet     psyllium (METAMUCIL) 58.6 % POWD     Ascorbic Acid (VITAMIN C PO)     VENTOLIN  (90 BASE) MCG/ACT inhaler     No current facility-administered medications for this visit.         No Known Allergies    Review Of Systems:  Constitutional: denies fever, weight changes, chills, and night sweats.  Eyes: denies blurred or double vision  Ears/Nose/Throat: denies ear pain, nose problems, difficulty swallowing  Respiratory: denies shortness of breath, cough   Skin: denies rash, lesions  Breast/Chest wall: denies new pain, lumps or discharge  Cardiovascular: denies chest pain, palpitations, edema  Gastrointestinal: denies abdominal pain, bloating, nausea, vomiting, early satiety  Genitourinary: denies difficulty with urination, blood in urine  Musculoskeletal:denies new muscle pain, bone pain  Neurologic: denies lightheadedness, headaches, numbness or tingling  Psychiatric: denies anxiety, depression; patient does get teary-eyed when discussing his wife  Hematologic/Lymphatic/Immunologic: denies easy bruising, easy bleeding, lumps or bumps noted  Endocrine:  "Denies increased thirst    Physical Exam:  /74 (BP Location: Left arm, Patient Position: Chair, Cuff Size: Adult Regular)  Pulse 70  Temp 98  F (36.7  C) (Tympanic)  Resp 20  Ht 1.727 m (5' 8\")  Wt 76.7 kg (169 lb)  SpO2 96%  BMI 25.7 kg/m2    GENERAL APPEARANCE: Healthy, alert and in no acute distress.  HEENT: Normocephalic, Sclerae anicteric. Oropharynx without ulcers, lesions, or thrush.  NECK:  No asymmetry or masses, no thyromegaly.  LYMPHATICS: No palpable cervical, supraclavicular, axillary, or inguinal nodes   RESP: Lungs clear to auscultation bilaterally, respirations regular and easy  CARDIOVASCULAR: Regular rate and rhythm. Normal S1, S2  ABDOMEN: Soft, nontender. Bowel sounds auscultated all 4 quadrants. No palpable organomegaly or masses.  BREAST/Chest wall: no lumps, masses or tenderness bilaterally  MUSCULOSKELETAL: Extremities without gross deformities noted. Lymphedema remains to right upper extremity.  Right upper extremity is in a sling and decreased range of motion  SKIN: No suspicious lesions or rashes.  NEURO: Alert and oriented x 3.  Gait steady.  PSYCHIATRIC: Mentation and affect appear normal.  Mood appropriate.    Laboratory:  Results for orders placed or performed in visit on 08/09/17   CBC with platelets differential   Result Value Ref Range    WBC 8.7 4.0 - 11.0 10e9/L    RBC Count 3.85 (L) 4.4 - 5.9 10e12/L    Hemoglobin 12.0 (L) 13.3 - 17.7 g/dL    Hematocrit 36.3 (L) 40.0 - 53.0 %    MCV 94 78 - 100 fl    MCH 31.2 26.5 - 33.0 pg    MCHC 33.1 31.5 - 36.5 g/dL    RDW 14.0 10.0 - 15.0 %    Platelet Count 271 150 - 450 10e9/L    Diff Method Automated Method     % Neutrophils 59.8 %    % Lymphocytes 25.2 %    % Monocytes 9.6 %    % Eosinophils 4.9 %    % Basophils 0.5 %    Absolute Neutrophil 5.2 1.6 - 8.3 10e9/L    Absolute Lymphocytes 2.2 0.8 - 5.3 10e9/L    Absolute Monocytes 0.8 0.0 - 1.3 10e9/L    Absolute Eosinophils 0.4 0.0 - 0.7 10e9/L    Absolute Basophils 0.0 0.0 - " 0.2 10e9/L   Comprehensive metabolic panel   Result Value Ref Range    Sodium 140 133 - 144 mmol/L    Potassium 4.7 3.4 - 5.3 mmol/L    Chloride 110 (H) 94 - 109 mmol/L    Carbon Dioxide 22 20 - 32 mmol/L    Anion Gap 8 3 - 14 mmol/L    Glucose 108 (H) 70 - 99 mg/dL    Urea Nitrogen 23 7 - 30 mg/dL    Creatinine 0.94 0.66 - 1.25 mg/dL    GFR Estimate 77 >60 mL/min/1.7m2    GFR Estimate If Black >90   GFR Calc   >60 mL/min/1.7m2    Calcium 9.0 8.5 - 10.1 mg/dL    Bilirubin Total 0.3 0.2 - 1.3 mg/dL    Albumin 3.5 3.4 - 5.0 g/dL    Protein Total 6.9 6.8 - 8.8 g/dL    Alkaline Phosphatase 64 40 - 150 U/L    ALT 22 0 - 70 U/L    AST 24 0 - 45 U/L   Lactate Dehydrogenase   Result Value Ref Range    Lactate Dehydrogenase 207 85 - 227 U/L   Ca27.29  breast tumor marker   Result Value Ref Range    CA 27-29 31 U/mL       Imaging Studies:  None for today      ASSESSMENT/PLAN:    #1  Breast cancer:  Diagnosed with Pathologic T1c N1mi M0 carcinoma of the right breast in July 2015.  He underwent mastectomy with node dissection with 1/17 nodes revealing micrometastases; tumor was 2cm, ER/IL positive, HER-2/thomas negative.  He'll continue with tamoxifen 20 mg daily and follow up in 4 months with a CBC, CMP, LDH and CA 27.29.    #2 right shoulder pain: I instructed him to use the Tylenol and ibuprofen as needed and a prescription for tramadol 50 mg was given #10 and I instructed him to start with one half of a tablet to see how he tolerates this.  He will be following up with Dr. Wren next week to discuss his shoulder pain and low back pain further.     I encouraged patient to call with any questions or concerns.    Radha Sales  [facility]

## 2017-08-16 NOTE — MR AVS SNAPSHOT
After Visit Summary   8/16/2017    Lio Daly    MRN: [id number]           Patient Information     Date Of Birth          5/28/1934        Visit Information        Provider Department      8/16/2017 2:00 PM Radha Sales NP [place]        Today's Diagnoses     Primary breast malignancy, right ([facility])    -  1    Left shoulder pain, unspecified chronicity          Care Instructions    We would like to see you back in 4 months. Please come 1week(s) prior for lab work.  When you are in need of a refill of your medications, please call your pharmacy and they will send us the request. If you have any questions please call [phone]            Follow-ups after your visit        Your next 10 appointments already scheduled     Aug 21, 2017  8:30 AM CDT   (Arrive by 8:15 AM)   SHORT with Rubén Wren MD   [facility] ([facility] )    [address]  [facility] [address]   [phone]            Oct 16, 2017 10:30 AM CDT   Radiology with [facility] Interventional Radiologist, [facility] INTERVENTIONAL ROOM   [facility] ([facility] )    [address] [address]   [phone]            Dec 08, 2017  2:00 PM CST   LAB with NA LAB   [facility] ([facility] )    [address]  [facility] [address]   [phone]            Dec 15, 2017  2:00 PM CST   (Arrive by 1:45 PM)   Return Visit with Radha Sales NP   [facility] ([facility] )    [address] [place] [address]   [phone]              Who to contact     If you have questions or need follow up information about today's clinic visit or your schedule please contact [facility] directly at [phone].  Normal or non-critical lab and imaging results will be communicated to you by MyChart, letter or phone within 4 business days after the clinic has received  "the results. If you do not hear from us within 7 days, please contact the clinic through [employer] or phone. If you have a critical or abnormal lab result, we will notify you by phone as soon as possible.  Submit refill requests through [employer] or call your pharmacy and they will forward the refill request to us. Please allow 3 business days for your refill to be completed.          Additional Information About Your Visit        [employer] Information     [employer] lets you send messages to your doctor, view your test results, renew your prescriptions, schedule appointments and more. To sign up, go to [email or web address] . Click on \"Log in\" on the left side of the screen, which will take you to the Welcome page. Then click on \"Sign up Now\" on the right side of the page.     You will be asked to enter the access code listed below, as well as some personal information. Please follow the directions to create your username and password.     Your access code is: [email or web address]  Expires: 2017  2:40 PM     Your access code will  in 90 days. If you need help or a new code, please call your [place] clinic or [phone].        Care EveryWhere ID     This is your Care EveryWhere ID. This could be used by other organizations to access your [place] medical records  [id number]        Your Vitals Were     Pulse Temperature Respirations Height Pulse Oximetry BMI (Body Mass Index)    70 98  F (36.7  C) (Tympanic) 20 1.727 m (5' 8\") 96% 25.7 kg/m2       Blood Pressure from Last 3 Encounters:   17 141/74   17 132/83   07/10/17 110/70    Weight from Last 3 Encounters:   17 76.7 kg (169 lb)   17 76.9 kg (169 lb 9.6 oz)   07/10/17 74.8 kg (165 lb)              Today, you had the following     No orders found for display         Today's Medication Changes          These changes are accurate as of: 17  2:40 PM.  If you have any questions, ask your nurse or doctor.               Start taking these " medicines.        Dose/Directions    traMADol 50 MG tablet   Commonly known as:  ULTRAM   Used for:  Primary breast malignancy, right (H), Left shoulder pain, unspecified chronicity   Started by:  Radha Sales NP        Dose:  50 mg   Take 1 tablet (50 mg) by mouth every 6 hours as needed for moderate pain   Quantity:  10 tablet   Refills:  0         These medicines have changed or have updated prescriptions.        Dose/Directions    tamsulosin 0.4 MG capsule   Commonly known as:  FLOMAX   This may have changed:  See the new instructions.   Used for:  BPH (benign prostatic hyperplasia)        TAKE 1 CAPSULE BY MOUTH DAILY   Quantity:  90 capsule   Refills:  3            Where to get your medicines      Some of these will need a paper prescription and others can be bought over the counter.  Ask your nurse if you have questions.     Bring a paper prescription for each of these medications     traMADol 50 MG tablet                Primary Care Provider Office Phone # Fax #    Rubén Wren [phone]       [address] [address]        Equal Access to Services     JESSICA [facility] : Hadii aad ku hadasho Soomaali, waaxda luqadaha, qaybta kaalmada adeegyada, waxay santanain hayallie snell . So [facility] [phone].    ATENCIÓN: Si habla español, tiene a dc disposición servicios gratuitos de asistencia lingüística. Llame al [phone].    We comply with applicable federal civil rights laws and [place] laws. We do not discriminate on the basis of race, color, national origin, age, disability sex, sexual orientation or gender identity.            Thank you!     Thank you for choosing [facility]  for your care. Our goal is always to provide you with excellent care. Hearing back from our patients is one way we can continue to improve our services. Please take a few minutes to complete the written survey that you may receive in the mail  after your visit with us. Thank you!             Your Updated Medication List - Protect others around you: Learn how to safely use, store and throw away your medicines at www.disposemymeds.org.          This list is accurate as of: 8/16/17  2:40 PM.  Always use your most recent med list.                   Brand Name Dispense Instructions for use Diagnosis    ALPRAZolam 0.5 MG tablet    XANAX    60 tablet    TAKE 1 OR 2 TABLETS BY MOUTH AT BEDTIME AS NEEDED    Anxiety       atenolol 50 MG tablet    TENORMIN    90 tablet    TAKE 1 TABLET BY MOUTH DAILY    Benign essential hypertension       gabapentin 100 MG capsule    NEURONTIN    30 capsule    Take 1 capsule (100 mg) by mouth At Bedtime    Spinal stenosis of lumbar region with neurogenic claudication       HYDROcodone-acetaminophen 5-325 MG per tablet    NORCO    30 tablet    Take 1 tablet by mouth every 6 hours as needed for moderate to severe pain    Chronic right shoulder pain, Malignant neoplasm of right male breast, unspecified estrogen receptor status, unspecified site of breast (H)       levothyroxine 75 MCG tablet    SYNTHROID/LEVOTHROID    30 tablet    TAKE 1 TABLET BY MOUTH DAILY    Hypothyroidism       MELATONIN PO      Take 5 mg by mouth At Bedtime        omeprazole 20 MG CR capsule    priLOSEC    90 capsule    TAKE 1 CAPSULE BY MOUTH DAILY    Gastroesophageal reflux disease, esophagitis presence not specified       pravastatin 40 MG tablet    PRAVACHOL    90 tablet    TAKE 1 TABLET BY MOUTH EVERY MORNING AND 1 EVERY OTHER EVENING    Hyperlipidemia LDL goal <130       psyllium 58.6 % Powd    METAMUCIL     Take 1 teaspoonful by mouth daily        sennosides 8.6 MG tablet    SENOKOT     Take 2 tablets by mouth At Bedtime Reported on 5/17/2017        tamoxifen 20 MG tablet    NOLVADEX    90 tablet    Take 1 tablet (20 mg) by mouth daily    Malignant neoplasm of right male breast, unspecified site of breast       tamsulosin 0.4 MG capsule    FLOMAX    90  capsule    TAKE 1 CAPSULE BY MOUTH DAILY    BPH (benign prostatic hyperplasia)       traMADol 50 MG tablet    ULTRAM    10 tablet    Take 1 tablet (50 mg) by mouth every 6 hours as needed for moderate pain    Primary breast malignancy, right (H), Left shoulder pain, unspecified chronicity       triamcinolone 0.5 % cream    KENALOG    30 g    Apply sparingly to affected area three times daily.    Rash       VENTOLIN  (90 BASE) MCG/ACT Inhaler   Generic drug:  albuterol     18 each    USE 2 PUFFS EVERY 6 HOURS AS NEEDED FOR SHORTNESS OF BREATH/DYSPNEA    SOB (shortness of breath)       VITAMIN C PO      Take 500 mg by mouth daily

## 2017-08-21 ENCOUNTER — OFFICE VISIT (OUTPATIENT)
Dept: FAMILY MEDICINE | Facility: OTHER | Age: 82
End: 2017-08-21
Attending: FAMILY MEDICINE
Payer: MEDICARE

## 2017-08-21 VITALS
RESPIRATION RATE: 16 BRPM | DIASTOLIC BLOOD PRESSURE: 53 MMHG | HEART RATE: 70 BPM | BODY MASS INDEX: 24.55 KG/M2 | HEIGHT: 68 IN | WEIGHT: 162 LBS | OXYGEN SATURATION: 98 % | TEMPERATURE: 97.6 F | SYSTOLIC BLOOD PRESSURE: 114 MMHG

## 2017-08-21 DIAGNOSIS — G89.29 OTHER CHRONIC PAIN: ICD-10-CM

## 2017-08-21 DIAGNOSIS — F41.9 ANXIETY: ICD-10-CM

## 2017-08-21 DIAGNOSIS — M25.511 RIGHT SHOULDER PAIN, UNSPECIFIED CHRONICITY: ICD-10-CM

## 2017-08-21 DIAGNOSIS — M48.00 SPINAL STENOSIS, UNSPECIFIED SPINAL REGION: Primary | ICD-10-CM

## 2017-08-21 DIAGNOSIS — C50.911 PRIMARY BREAST MALIGNANCY, RIGHT (H): ICD-10-CM

## 2017-08-21 DIAGNOSIS — M48.062 SPINAL STENOSIS OF LUMBAR REGION WITH NEUROGENIC CLAUDICATION: ICD-10-CM

## 2017-08-21 PROBLEM — Z79.899 CONTROLLED SUBSTANCE AGREEMENT SIGNED: Chronic | Status: ACTIVE | Noted: 2017-08-21

## 2017-08-21 PROCEDURE — 20610 DRAIN/INJ JOINT/BURSA W/O US: CPT | Performed by: FAMILY MEDICINE

## 2017-08-21 PROCEDURE — 99000 SPECIMEN HANDLING OFFICE-LAB: CPT | Mod: ZL | Performed by: FAMILY MEDICINE

## 2017-08-21 PROCEDURE — 99214 OFFICE O/P EST MOD 30 MIN: CPT | Mod: 25 | Performed by: FAMILY MEDICINE

## 2017-08-21 PROCEDURE — 80307 DRUG TEST PRSMV CHEM ANLYZR: CPT | Mod: ZL | Performed by: FAMILY MEDICINE

## 2017-08-21 PROCEDURE — 99212 OFFICE O/P EST SF 10 MIN: CPT

## 2017-08-21 RX ORDER — TRIAMCINOLONE ACETONIDE 40 MG/ML
80 INJECTION, SUSPENSION INTRA-ARTICULAR; INTRAMUSCULAR ONCE
Qty: 2 ML | Refills: 0 | OUTPATIENT
Start: 2017-08-21 | End: 2017-08-21

## 2017-08-21 RX ORDER — LIDOCAINE HYDROCHLORIDE 10 MG/ML
8 INJECTION, SOLUTION INFILTRATION; PERINEURAL ONCE
Qty: 8 ML | Refills: 0 | OUTPATIENT
Start: 2017-08-21 | End: 2017-08-21

## 2017-08-21 RX ORDER — ALPRAZOLAM 0.5 MG
TABLET ORAL
Qty: 60 TABLET | Refills: 3 | Status: SHIPPED | OUTPATIENT
Start: 2017-08-21 | End: 2017-11-10

## 2017-08-21 RX ORDER — GABAPENTIN 300 MG/1
300 CAPSULE ORAL AT BEDTIME
Qty: 30 CAPSULE | Refills: 3 | Status: SHIPPED | OUTPATIENT
Start: 2017-08-21 | End: 2017-12-15

## 2017-08-21 ASSESSMENT — ANXIETY QUESTIONNAIRES
2. NOT BEING ABLE TO STOP OR CONTROL WORRYING: SEVERAL DAYS
7. FEELING AFRAID AS IF SOMETHING AWFUL MIGHT HAPPEN: NOT AT ALL
GAD7 TOTAL SCORE: 4
5. BEING SO RESTLESS THAT IT IS HARD TO SIT STILL: NOT AT ALL
6. BECOMING EASILY ANNOYED OR IRRITABLE: SEVERAL DAYS
1. FEELING NERVOUS, ANXIOUS, OR ON EDGE: NOT AT ALL
3. WORRYING TOO MUCH ABOUT DIFFERENT THINGS: SEVERAL DAYS
IF YOU CHECKED OFF ANY PROBLEMS ON THIS QUESTIONNAIRE, HOW DIFFICULT HAVE THESE PROBLEMS MADE IT FOR YOU TO DO YOUR WORK, TAKE CARE OF THINGS AT HOME, OR GET ALONG WITH OTHER PEOPLE: NOT DIFFICULT AT ALL

## 2017-08-21 ASSESSMENT — PAIN SCALES - GENERAL: PAINLEVEL: MILD PAIN (3)

## 2017-08-21 ASSESSMENT — PATIENT HEALTH QUESTIONNAIRE - PHQ9
5. POOR APPETITE OR OVEREATING: SEVERAL DAYS
SUM OF ALL RESPONSES TO PHQ QUESTIONS 1-9: 9

## 2017-08-21 NOTE — MR AVS SNAPSHOT
After Visit Summary   8/21/2017    Lio Daly    MRN: [id number]           Patient Information     Date Of Birth          5/28/1934        Visit Information        Provider Department      8/21/2017 8:30 AM Rubén Wren MD [facility]        Today's Diagnoses     Spinal stenosis, unspecified spinal region    -  1    Right shoulder pain, unspecified chronicity        Anxiety        Spinal stenosis of lumbar region with neurogenic claudication        Other chronic pain        Primary breast malignancy, right (H)          Care Instructions    F/u with ongoing concerns.           Follow-ups after your visit        Your next 10 appointments already scheduled     Oct 16, 2017 10:30 AM CDT   Radiology with Need Interventional Radiologist, [facility] INTERVENTIONAL ROOM   [facility] ([facility] )    [address] [address]   [phone]            Dec 08, 2017  2:00 PM CST   LAB with NA LAB   [facility] ([facility] )    [address]  [facility] [address]   [phone]            Dec 15, 2017  2:00 PM CST   (Arrive by 1:45 PM)   Return Visit with Radha Sales NP   [facility] ([facility] )    [address]  [facility] [address]   [phone]              Who to contact     If you have questions or need follow up information about today's clinic visit or your schedule please contact [facility] directly at [phone].  Normal or non-critical lab and imaging results will be communicated to you by MyChart, letter or phone within 4 business days after the clinic has received the results. If you do not hear from us within 7 days, please contact the clinic through MyChart or phone. If you have a critical or abnormal lab result, we will notify you by phone as soon as possible.  Submit refill requests through [employer] or call your pharmacy and they will  "forward the refill request to us. Please allow 3 business days for your refill to be completed.          Additional Information About Your Visit        GonnaBeharLingt Information     [employer] lets you send messages to your doctor, view your test results, renew your prescriptions, schedule appointments and more. To sign up, go to [email or web address] . Click on \"Log in\" on the left side of the screen, which will take you to the Welcome page. Then click on \"Sign up Now\" on the right side of the page.     You will be asked to enter the access code listed below, as well as some personal information. Please follow the directions to create your username and password.     Your access code is: [email or web address]  Expires: 2017  2:40 PM     Your access code will  in 90 days. If you need help or a new code, please call your [facility] clinic or [phone].        Care EveryWhere ID     This is your Care EveryWhere ID. This could be used by other organizations to access your [facility] medical records  [id number]        Your Vitals Were     Pulse Temperature Respirations Height Pulse Oximetry BMI (Body Mass Index)    70 97.6  F (36.4  C) (Tympanic) 16 5' 7.5\" (1.715 m) 98% 25 kg/m2       Blood Pressure from Last 3 Encounters:   17 114/53   17 141/74   17 132/83    Weight from Last 3 Encounters:   17 162 lb (73.5 kg)   17 169 lb (76.7 kg)   17 169 lb 9.6 oz (76.9 kg)              We Performed the Following     Kenalog  80 MG         []     Large Joint/Bursa injection and/or drainage (Shoulder, Knee)     Lidocaine 1% or 2%     []     Pain Drug Scr UR W Rptd Meds          Today's Medication Changes          These changes are accurate as of: 17  9:19 AM.  If you have any questions, ask your nurse or doctor.               Start taking these medicines.        Dose/Directions    lidocaine 1 % injection   Used for:  Right shoulder pain, unspecified chronicity   Started by:  Holger, " Rubén MIRELES MD        Dose:  8 mL   8 mLs by INTRA-ARTICULAR route once for 1 dose   Quantity:  8 mL   Refills:  0       triamcinolone acetonide 40 MG/ML injection   Commonly known as:  KENALOG   Used for:  Right shoulder pain, unspecified chronicity   Started by:  Rubén Wren MD        Dose:  80 mg   2 mLs (80 mg) by INTRA-ARTICULAR route once for 1 dose   Quantity:  2 mL   Refills:  0         These medicines have changed or have updated prescriptions.        Dose/Directions    ALPRAZolam 0.5 MG tablet   Commonly known as:  XANAX   This may have changed:  See the new instructions.   Used for:  Anxiety   Changed by:  Rubén Wren MD        TAKE 1 OR 2 TABLETS BY MOUTH AT BEDTIME AS NEEDED   Quantity:  60 tablet   Refills:  3       gabapentin 300 MG capsule   Commonly known as:  NEURONTIN   This may have changed:    - medication strength  - how much to take   Used for:  Spinal stenosis of lumbar region with neurogenic claudication   Changed by:  Rubén Wren MD        Dose:  300 mg   Take 1 capsule (300 mg) by mouth At Bedtime   Quantity:  30 capsule   Refills:  3       tamsulosin 0.4 MG capsule   Commonly known as:  FLOMAX   This may have changed:  See the new instructions.   Used for:  BPH (benign prostatic hyperplasia)        TAKE 1 CAPSULE BY MOUTH DAILY   Quantity:  90 capsule   Refills:  3            Where to get your medicines      These medications were sent to [facility] - [address]  [address] [address]     Phone:  [phone]     gabapentin 300 MG capsule         Some of these will need a paper prescription and others can be bought over the counter.  Ask your nurse if you have questions.     Bring a paper prescription for each of these medications     ALPRAZolam 0.5 MG tablet       You don't need a prescription for these medications     lidocaine 1 % injection    triamcinolone acetonide 40 MG/ML injection                Primary Care Provider  Office Phone # Fax #    Rubén Wren [phone]       [facility] [address]  [facility] [address]        Equal Access to Services     DEEP ROBERTSON : Hadii luis manuel ku hadruo Soreneali, waaxda luqadaha, qaybta kaalmada adeegyada, eileen rae laIsabelaallie hull. So [facility] [phone].    ATENCIÓN: Si habla español, tiene a dc disposición servicios gratuitos de asistencia lingüística. Llame al [phone].    We comply with applicable federal civil rights laws and [place] laws. We do not discriminate on the basis of race, color, national origin, age, disability sex, sexual orientation or gender identity.            Thank you!     Thank you for choosing [facility]  for your care. Our goal is always to provide you with excellent care. Hearing back from our patients is one way we can continue to improve our services. Please take a few minutes to complete the written survey that you may receive in the mail after your visit with us. Thank you!             Your Updated Medication List - Protect others around you: Learn how to safely use, store and throw away your medicines at www.disposemymeds.org.          This list is accurate as of: 8/21/17  9:19 AM.  Always use your most recent med list.                   Brand Name Dispense Instructions for use Diagnosis    ALPRAZolam 0.5 MG tablet    XANAX    60 tablet    TAKE 1 OR 2 TABLETS BY MOUTH AT BEDTIME AS NEEDED    Anxiety       atenolol 50 MG tablet    TENORMIN    90 tablet    TAKE 1 TABLET BY MOUTH DAILY    Benign essential hypertension       gabapentin 300 MG capsule    NEURONTIN    30 capsule    Take 1 capsule (300 mg) by mouth At Bedtime    Spinal stenosis of lumbar region with neurogenic claudication       HYDROcodone-acetaminophen 5-325 MG per tablet    NORCO    30 tablet    Take 1 tablet by mouth every 6 hours as needed for moderate to severe pain    Chronic right shoulder pain, Malignant neoplasm of right  male breast, unspecified estrogen receptor status, unspecified site of breast (H)       levothyroxine 75 MCG tablet    SYNTHROID/LEVOTHROID    30 tablet    TAKE 1 TABLET BY MOUTH DAILY    Hypothyroidism       lidocaine 1 % injection     8 mL    8 mLs by INTRA-ARTICULAR route once for 1 dose    Right shoulder pain, unspecified chronicity       MELATONIN PO      Take 5 mg by mouth At Bedtime        omeprazole 20 MG CR capsule    priLOSEC    90 capsule    TAKE 1 CAPSULE BY MOUTH DAILY    Gastroesophageal reflux disease, esophagitis presence not specified       pravastatin 40 MG tablet    PRAVACHOL    90 tablet    TAKE 1 TABLET BY MOUTH EVERY MORNING AND 1 EVERY OTHER EVENING    Hyperlipidemia LDL goal <130       psyllium 58.6 % Powd    METAMUCIL     Take 1 teaspoonful by mouth daily        sennosides 8.6 MG tablet    SENOKOT     Take 2 tablets by mouth At Bedtime Reported on 5/17/2017        tamoxifen 20 MG tablet    NOLVADEX    90 tablet    Take 1 tablet (20 mg) by mouth daily    Malignant neoplasm of right male breast, unspecified site of breast       tamsulosin 0.4 MG capsule    FLOMAX    90 capsule    TAKE 1 CAPSULE BY MOUTH DAILY    BPH (benign prostatic hyperplasia)       traMADol 50 MG tablet    ULTRAM    10 tablet    Take 1 tablet (50 mg) by mouth every 6 hours as needed for moderate pain    Primary breast malignancy, right (H), Left shoulder pain, unspecified chronicity       triamcinolone 0.5 % cream    KENALOG    30 g    Apply sparingly to affected area three times daily.    Rash       triamcinolone acetonide 40 MG/ML injection    KENALOG    2 mL    2 mLs (80 mg) by INTRA-ARTICULAR route once for 1 dose    Right shoulder pain, unspecified chronicity       VENTOLIN  (90 BASE) MCG/ACT Inhaler   Generic drug:  albuterol     18 each    USE 2 PUFFS EVERY 6 HOURS AS NEEDED FOR SHORTNESS OF BREATH/DYSPNEA    SOB (shortness of breath)       VITAMIN C PO      Take 500 mg by mouth daily

## 2017-08-21 NOTE — LETTER
[place]    08/21/17    Patient: Lio Daly  YOB: 1934  Medical Record Number: [id number]                                                                  Controlled Substance Agreement  I understand that my care provider has prescribed controlled substances (narcotics, tranquilizers, and/or stimulants) to help manage my condition(s).  I am taking this medicine to help me function or work.  I know that this is strong medicine.  It could have serious side effects and even cause a dependency on the drug.  If I stop these medicines suddenly, I could have severe withdrawal symptoms.    The risks, benefits, and side effects of these medication(s) were explained to me.  I agree that:  1. I will take part in other treatments as advised by my provider.  This may be psychiatry or counseling, physical therapy, behavioral therapy, group treatment, or a referral to a pain clinic.  I will reduce or stop my medicine when my provider tells me to do so.   2. I will take my medicines as prescribed.  I will not change the dose or schedule unless my provider tells me to.  There will be no refills if I  run out early.   I may be contacted at any time without warning and asked to complete a drug test or pill count.   3. I will keep all my appointments at the clinic.  If I miss appointments or fail to follow instructions, my provider may stop my medicine.  4. I will not ask other providers to prescribe controlled substances. And I will not accept controlled substances from other people. If I need another prescribed controlled substance for a new reason, I will notify my provider within one business day.  5. If I enroll in the [place] Medical Marijuana program, I will tell my provider.  I will also sign an agreement to share my medical records with my provider.  6. I will use one pharmacy to fill all of my controlled substance prescriptions.  If my prescription is mailed to my pharmacy, it may take 5 to 7  days for my medicine to be ready.  7. I understand that my provider, clinic care team, and pharmacy can track controlled substance prescriptions from other providers through a central database (prescription monitoring program).  8. I will bring in my list of medications (or my medicine bottles) each time I come to the clinic.  REV-  04/2016                                                                                                                                            Page 1 of 2      [facility]    08/21/17    Patient: Lio Daly  YOB: 1934  Medical Record Number: [id number]    9. Refills of controlled substances will be made only during office hours.  It is up to me to make sure that I do not run out of my medicines on weekends or holidays.    10. I am responsible for my prescriptions.  If the medicine is lost or stolen, it will not be replaced.   I also agree not to share these medicines with anyone.  11. I agree to not use ANY illegal or recreational drugs.  This includes marijuana, cocaine, bath salts or other drugs.  I agree not to use alcohol unless my provider says I may.  I agree to give urine samples whenever asked.  If I fail to give a urine sample, the provider may stop my medicine.     12. I will tell my nurse or provider right away if I become pregnant or have a new medical problem treated outside of [facility].  13. I understand that this medicine can affect my thinking and judgment.  It may be unsafe for me to drive, use machinery and do dangerous tasks.  I will not do any of these things until I know how the medicine affects me.  If my dose changes, I will wait to see how it affects me.  I will contact my provider if I have concerns about medicine side effects.  I understand that if I do not follow any of the conditions above, my prescriptions or treatment may be stopped.    I agree that my provider, clinic care team, and pharmacy may work with any [facility], state  or federal law enforcement agency that investigates the misuse, sale, or other diversion of my controlled medicine. I will allow my provider to discuss my care with or share a copy of this agreement with any other treating provider, pharmacy or emergency room where I receive care.  I agree to give up (waive) any right of privacy or confidentiality with respect to these authorizations.   I have read this agreement and have asked questions about anything I did not understand.   ___________________________________    ___________________________  Patient Signature                                                           Date and Time  ___________________________________     ____________________________  Witness                                                                            Date and Time  ___________________________________  Rubén Wren MD  REV-  04/2016                                                                                                                                                                 Page 2 of 2

## 2017-08-21 NOTE — PROGRESS NOTES
Leodansylvie WILCOX Malika    August 21, 2017    Chief Complaint   Patient presents with     Back Pain     Pt is in for a FU on LBP. Pt's back and legs are better. Pt still has tingling in feet. Pt is wondering about dose increase on the Neurontin.      Shoulder     Pt continues to have right shoulder pain.        SUBJECTIVE:  Here for 2 big issues.  Right shoulder severely painful.  He would like injection and we talked a lot about lymphedema and reviewed his old MRI showing djd of the joint and torn cuff.  He is in severe pain at times that he can't stand it.  Also, LBP is improved, as is leg pain.  neurontin helps the legs.  He is working out and getting stronger on his rowing machine.  We talked about meds, controlled substances, and updated.  See below.  Reliable use of xanax without change and no side effects.  We have continued and he is well aware of the risk of falls.      Past Medical History:   Diagnosis Date     Dysphagia 06/28/2004     HTN (hypertension)      Need for prophylactic vaccination and inoculation 12/10/2001     Need for prophylactic vaccination and inoculation, 11/06/2003     Other and unspecified hyperlipidemia 05/01/2001     Prediabetes      Shortness of breath 03/28/2006     Shoulder pain 06/15/2012     Unspecified essential hypertension 05/18/2000       Past Surgical History:   Procedure Laterality Date     CARDIAC SURGERY      angiogram 1992 [facility]     COLONOSCOPY      1998     GENITOURINARY SURGERY      prostate bx [place]     GENITOURINARY SURGERY      Prostate bx [facility]     GI SURGERY      EGD     GI SURGERY      Pt has EGD with removal of food from esophagus     MASTECTOMY SIMPLE, SENTINEL NODE, COMBINED Right 7/27/2015    Procedure: COMBINED MASTECTOMY SIMPLE, SENTINEL NODE;  Surgeon: Aliyah Garsia MD;  Location: [facility] OR     ORTHOPEDIC SURGERY      bilateral knee replacements       Current Outpatient Prescriptions   Medication Sig Dispense Refill     gabapentin (NEURONTIN) 300 MG  capsule Take 1 capsule (300 mg) by mouth At Bedtime 30 capsule 3     traMADol (ULTRAM) 50 MG tablet Take 1 tablet (50 mg) by mouth every 6 hours as needed for moderate pain 10 tablet 0     HYDROcodone-acetaminophen (NORCO) 5-325 MG per tablet Take 1 tablet by mouth every 6 hours as needed for moderate to severe pain 30 tablet 0     ALPRAZolam (XANAX) 0.5 MG tablet TAKE 1 OR 2 TABLETS BY MOUTH AT BEDTIME AS NEEDED 60 tablet 0     MELATONIN PO Take 5 mg by mouth At Bedtime       atenolol (TENORMIN) 50 MG tablet TAKE 1 TABLET BY MOUTH DAILY 90 tablet 2     tamsulosin (FLOMAX) 0.4 MG capsule TAKE 1 CAPSULE BY MOUTH DAILY (Patient taking differently: Takes one in morning and one at night) 90 capsule 3     pravastatin (PRAVACHOL) 40 MG tablet TAKE 1 TABLET BY MOUTH EVERY MORNING AND 1 EVERY OTHER EVENING 90 tablet 3     omeprazole (PRILOSEC) 20 MG CR capsule TAKE 1 CAPSULE BY MOUTH DAILY 90 capsule 2     levothyroxine (SYNTHROID/LEVOTHROID) 75 MCG tablet TAKE 1 TABLET BY MOUTH DAILY 30 tablet 11     tamoxifen (NOLVADEX) 20 MG tablet Take 1 tablet (20 mg) by mouth daily 90 tablet 0     triamcinolone (KENALOG) 0.5 % cream Apply sparingly to affected area three times daily. 30 g 1     sennosides (SENOKOT) 8.6 MG tablet Take 2 tablets by mouth At Bedtime Reported on 5/17/2017       psyllium (METAMUCIL) 58.6 % POWD Take 1 teaspoonful by mouth daily       Ascorbic Acid (VITAMIN C PO) Take 500 mg by mouth daily       VENTOLIN  (90 BASE) MCG/ACT inhaler USE 2 PUFFS EVERY 6 HOURS AS NEEDED FOR SHORTNESS OF BREATH/DYSPNEA 18 each 1     [DISCONTINUED] gabapentin (NEURONTIN) 100 MG capsule Take 1 capsule (100 mg) by mouth At Bedtime 30 capsule 1       No Known Allergies    Family History   Problem Relation Age of Onset     Cancer - colorectal Mother      Eye Disorder Mother      cataracts     Musculoskeletal Disorder Mother      CTS     CANCER Father      lung cancer     Genitourinary Problems Father      prostate problems      MALLORY Brother      CABG     Eye Disorder Brother      cataracts     Breast Cancer Other      Ovarian Cancer Other      Pancreatic Cancer Other        Social History     Social History     Marital status:      Spouse name: Marisa     Number of children: N/A     Years of education: N/A     Occupational History     Not on file.     Social History Main Topics     Smoking status: Former Smoker     Types: Cigarettes     Smokeless tobacco: Never Used      Comment: tried to quit-yes     Alcohol use No      Comment: former     Drug use: No     Sexual activity: Not on file     Other Topics Concern     Caffeine Concern Yes     coffee, 2 cups daily     Parent/Sibling W/ Cabg, Mi Or Angioplasty Before 65f 55m? No     Social History Narrative       5 point ROS negative except as noted above in HPI, including Gen., Resp., CV, GI &  system review.     OBJECTIVE:  B/P: 114/53, T: 97.6, P: 70, R: 16    GENERAL APPEARANCE: Alert, no acute distress  CV: regular rate and rhythm, no murmur, rub or gallop  RESP: lungs clear to auscultation bilaterally  ABDOMEN: normal bowel sounds, soft, nontender, no hepatosplenomegaly or other masses  MSK:  Severe pain with shoulder abduction.  Walks with a cane.    SKIN: no suspicious lesions or rashes to visualized skin  NEURO: Alert, oriented x 3, speech and mentation normal    ASSESSMENT and PLAN:  (M48.00) Spinal stenosis, unspecified spinal region  (primary encounter diagnosis)  Comment: reviewed.   Plan: Pain Drug Scr UR W Rptd Meds        He is doing better with the neurontin.  Continue cares at this point and increase to 300 daily at night.  It has been helpful.  He is also getting stronger.  We talked a long time about this today and daughter was here.      (M25.511) Right shoulder pain, unspecified chronicity  Comment: injected.   Plan: Large Joint/Bursa injection and/or drainage         (Shoulder, Knee), Kenalog  80 MG                 [], Lidocaine 1% or 2%     [],          triamcinolone acetonide (KENALOG) 40 MG/ML         injection, lidocaine 1 % injection        Lengthy risk/benefit discussion due to existing lymphedema.  We proceeded.      (F41.9) Anxiety  Comment: ongoing  Plan: Pain Drug Scr UR W Rptd Meds, ALPRAZolam         (XANAX) 0.5 MG tablet        Update UDS and contract.  Uses xanax daily.     (M48.06) Spinal stenosis of lumbar region with neurogenic claudication  Comment: as above.   Plan: gabapentin (NEURONTIN) 300 MG capsule        As above.     (G89.29) Other chronic pain  Comment: as above.   Plan: Pain Drug Scr UR W Rptd Meds        As above.     (C50.911) Primary breast malignancy, right (H)  Comment: with lymphedema left arm.   Plan: reviewed at length.  Risk of infection higher for sure.  He wants to proceed.  I followed entirely sterile technique as well.         PROCEDURE:  JOINT ASPIRATION/INJECTION.         After a discussion of risks, benefits and side effects of procedure, informed patient consent was obtained.       The right shoulder posteriorly was prepped and draped in the usual sterile fashion ASPIRATION: Not performed.                               INJECTION:  Using 8cc of 1% lidocaine mixed                           with 80 mg of triamcinolone, the right subacromial bursa and joint was successfully injected                          without complication.  Patient did experience some pain                          relief following injection.

## 2017-08-21 NOTE — LETTER
August 29, 2017      Lio Daly  [address]  [facility] [address]        Dear ,    We are writing to inform you of your test results.    Your test results fall within the expected range.  Please continue with current treatment plan.    Resulted Orders   Pain Drug Scr UR W Rptd Meds   Result Value Ref Range    Pain Drug SCR UR W RPTD Meds FINAL       Comment:      (Note)  ====================================================================  [facility] COMP DRUG ANALYSIS,UR  ====================================================================  Test                             Result       Flag       Units        Drug Present and Declared for Prescription Verification   Alprazolam                     180          EXPECTED   ng/mg creat   Alpha-hydroxyalprazolam        275          EXPECTED   ng/mg creat    Source of alprazolam is a scheduled prescription medication.    Alpha-hydroxyalprazolam is an expected metabolite of alprazolam.   Tramadol                       PRESENT      EXPECTED                 O-Desmethyltramadol            PRESENT      EXPECTED                 N-Desmethyltramadol            PRESENT      EXPECTED                  Source of tramadol is a prescription medication.    O-desmethyltramadol and N-desmethyltramadol are expected    metabolites of tramadol.   Gabapentin                     PRESENT      EXPECTED                 Aceta  minophen                  PRESENT      EXPECTED                Drug Present not Declared for Prescription Verification   Zolpidem                       PRESENT      UNEXPECTED               Ibuprofen                      PRESENT      UNEXPECTED               Chlorpheniramine               PRESENT      UNEXPECTED               Diphenhydramine                PRESENT      UNEXPECTED               Doxylamine                     PRESENT      UNEXPECTED               Lidocaine                      PRESENT      UNEXPECTED               Atenolol                        PRESENT      UNEXPECTED               Dextromethorphan               PRESENT      UNEXPECTED               Dextrorphan/Levorphanol        PRESENT      UNEXPECTED                Dextrorphan is an expected metabolite of dextromethorphan, an    over-the-counter or prescription cough suppressant. Dextrorphan    cannot be distinguished from the scheduled prescription    medication levorphanol by the method used for analysis.  Drug Abse  nt but Declared for Prescription Verification   Hydrocodone                    Not Detected UNEXPECTED ng/mg creat  ====================================================================  Test                      Result    Flag   Units      Ref Range        Creatinine              101              mg/dL      >=20            ====================================================================  Declared Medications:  The flagging and interpretation on this report are based on the  following declared medications.  Unexpected results may arise from  inaccuracies in the declared medications.  **Note: The testing scope of this panel includes these medications:  Alprazolam (Xanax)  Gabapentin (Neurontin)  Hydrocodone (Norco)  Tramadol (Ultram)  **Note: The testing scope of this panel does not include small to  moderate amounts of these reported medications:  Acetaminophen (Norco)  ====================================================================  For clinical consultation, please call [phone].  ====================================================================  Analysis performed by [facility], [address] [place] [address]         If you have any questions or concerns, please call the clinic at the number listed above.       Sincerely,        Rubén Wren MD

## 2017-08-21 NOTE — NURSING NOTE
"Chief Complaint   Patient presents with     Back Pain     Pt is in for a FU on LBP. Pt's back and legs are better. Pt still has tingling in feet. Pt is wondering about dose increase on the Neurontin.      Shoulder     Pt continues to have right shoulder pain.        Initial /53 (BP Location: Left arm, Patient Position: Chair, Cuff Size: Adult Regular)  Pulse 70  Temp 97.6  F (36.4  C) (Tympanic)  Resp 16  Ht 5' 7.5\" (1.715 m)  Wt 162 lb (73.5 kg)  SpO2 98%  BMI 25 kg/m2 Estimated body mass index is 25 kg/(m^2) as calculated from the following:    Height as of this encounter: 5' 7.5\" (1.715 m).    Weight as of this encounter: 162 lb (73.5 kg).  Medication Reconciliation: complete   Flor Wyman    "

## 2017-08-22 ASSESSMENT — ANXIETY QUESTIONNAIRES: GAD7 TOTAL SCORE: 4

## 2017-08-28 LAB — PAIN DRUG SCR UR W RPTD MEDS: NORMAL

## 2017-09-25 ENCOUNTER — TELEPHONE (OUTPATIENT)
Dept: INTERVENTIONAL RADIOLOGY/VASCULAR | Facility: HOSPITAL | Age: 82
End: 2017-09-25

## 2017-09-25 NOTE — TELEPHONE ENCOUNTER
Patient is calling wondering if he could get a flu shot. He is coming in for an injection for his back. I told with immunization he needs to have this done two weeks prior or after this injection. Patient verbalized understanding of this and will get his flu shot after this injection. MARITO, REGGIE

## 2017-10-13 ENCOUNTER — HOSPITAL ENCOUNTER (OUTPATIENT)
Dept: INTERVENTIONAL RADIOLOGY/VASCULAR | Facility: HOSPITAL | Age: 82
Discharge: HOME | End: 2017-10-13
Attending: FAMILY MEDICINE | Admitting: FAMILY MEDICINE
Payer: MEDICARE

## 2017-10-13 PROCEDURE — 64483 NJX AA&/STRD TFRM EPI L/S 1: CPT | Mod: TC,50

## 2017-10-13 PROCEDURE — 25000125 ZZHC RX 250: Performed by: RADIOLOGY

## 2017-10-13 PROCEDURE — 25000128 H RX IP 250 OP 636: Performed by: RADIOLOGY

## 2017-10-13 RX ORDER — METHYLPREDNISOLONE ACETATE 80 MG/ML
80 INJECTION, SUSPENSION INTRA-ARTICULAR; INTRALESIONAL; INTRAMUSCULAR; SOFT TISSUE ONCE
Status: DISCONTINUED | OUTPATIENT
Start: 2017-10-13 | End: 2017-10-13 | Stop reason: CLARIF

## 2017-10-13 RX ORDER — LIDOCAINE HYDROCHLORIDE 10 MG/ML
INJECTION, SOLUTION EPIDURAL; INFILTRATION; INTRACAUDAL; PERINEURAL
Status: DISCONTINUED
Start: 2017-10-13 | End: 2017-10-14 | Stop reason: HOSPADM

## 2017-10-13 RX ORDER — METHYLPREDNISOLONE ACETATE 80 MG/ML
INJECTION, SUSPENSION INTRA-ARTICULAR; INTRALESIONAL; INTRAMUSCULAR; SOFT TISSUE
Status: DISCONTINUED
Start: 2017-10-13 | End: 2017-10-13 | Stop reason: WASHOUT

## 2017-10-13 RX ORDER — IOPAMIDOL 612 MG/ML
15 INJECTION, SOLUTION INTRATHECAL ONCE
Status: COMPLETED | OUTPATIENT
Start: 2017-10-13 | End: 2017-10-13

## 2017-10-13 RX ORDER — DEXAMETHASONE SODIUM PHOSPHATE 10 MG/ML
20 INJECTION, SOLUTION INTRAMUSCULAR; INTRAVENOUS ONCE
Status: COMPLETED | OUTPATIENT
Start: 2017-10-13 | End: 2017-10-13

## 2017-10-13 RX ORDER — DEXAMETHASONE SODIUM PHOSPHATE 10 MG/ML
INJECTION, SOLUTION INTRAMUSCULAR; INTRAVENOUS
Status: DISCONTINUED
Start: 2017-10-13 | End: 2017-10-14 | Stop reason: HOSPADM

## 2017-10-13 RX ADMIN — IOPAMIDOL 4 ML: 612 INJECTION, SOLUTION INTRATHECAL at 14:21

## 2017-10-13 RX ADMIN — DEXAMETHASONE SODIUM PHOSPHATE 20 MG: 10 INJECTION INTRAMUSCULAR; INTRAVENOUS at 14:21

## 2017-10-13 RX ADMIN — LIDOCAINE HYDROCHLORIDE 2 ML: 10 INJECTION, SOLUTION EPIDURAL; INFILTRATION; INTRACAUDAL; PERINEURAL at 14:21

## 2017-10-13 NOTE — IP AVS SNAPSHOT
MRN:[id number]                      After Visit Summary   10/13/2017    Lio Daly    MRN: [id number]           Visit Information        Provider Department      10/13/2017  1:30 PM [facility]; HII[facility]           Review of your medicines      UNREVIEWED medicines. Ask your doctor about these medicines        Dose / Directions    ALPRAZolam 0.5 MG tablet   Commonly known as:  XANAX   Used for:  Anxiety        TAKE 1 OR 2 TABLETS BY MOUTH AT BEDTIME AS NEEDED   Quantity:  60 tablet   Refills:  3       atenolol 50 MG tablet   Commonly known as:  TENORMIN   Used for:  Benign essential hypertension        TAKE 1 TABLET BY MOUTH DAILY   Quantity:  90 tablet   Refills:  2       gabapentin 300 MG capsule   Commonly known as:  NEURONTIN   Used for:  Spinal stenosis of lumbar region with neurogenic claudication        Dose:  300 mg   Take 1 capsule (300 mg) by mouth At Bedtime   Quantity:  30 capsule   Refills:  3       HYDROcodone-acetaminophen 5-325 MG per tablet   Commonly known as:  NORCO   Used for:  Chronic right shoulder pain, Malignant neoplasm of right male breast, unspecified estrogen receptor status, unspecified site of breast (H)        Dose:  1 tablet   Take 1 tablet by mouth every 6 hours as needed for moderate to severe pain   Quantity:  30 tablet   Refills:  0       levothyroxine 75 MCG tablet   Commonly known as:  SYNTHROID/LEVOTHROID   Used for:  Hypothyroidism        TAKE 1 TABLET BY MOUTH DAILY   Quantity:  30 tablet   Refills:  11       MELATONIN PO        Dose:  5 mg   Take 5 mg by mouth At Bedtime   Refills:  0       omeprazole 20 MG CR capsule   Commonly known as:  priLOSEC   Used for:  Gastroesophageal reflux disease, esophagitis presence not specified        TAKE 1 CAPSULE BY MOUTH DAILY   Quantity:  90 capsule   Refills:  2       pravastatin 40 MG tablet   Commonly known as:  PRAVACHOL   Used for:  Hyperlipidemia LDL goal <130        TAKE 1 TABLET BY MOUTH  EVERY MORNING AND 1 EVERY OTHER EVENING   Quantity:  90 tablet   Refills:  3       psyllium 58.6 % Powd   Commonly known as:  METAMUCIL        Dose:  1 teaspoonful   Take 1 teaspoonful by mouth daily   Refills:  0       sennosides 8.6 MG tablet   Commonly known as:  SENOKOT        Dose:  2 tablet   Take 2 tablets by mouth At Bedtime Reported on 5/17/2017   Refills:  0       tamoxifen 20 MG tablet   Commonly known as:  NOLVADEX   Used for:  Malignant neoplasm of right male breast, unspecified site of breast        Dose:  20 mg   Take 1 tablet (20 mg) by mouth daily   Quantity:  90 tablet   Refills:  0       tamsulosin 0.4 MG capsule   Commonly known as:  FLOMAX   Used for:  BPH (benign prostatic hyperplasia)        TAKE 1 CAPSULE BY MOUTH DAILY   Quantity:  90 capsule   Refills:  3       traMADol 50 MG tablet   Commonly known as:  ULTRAM   Used for:  Primary breast malignancy, right (H), Left shoulder pain, unspecified chronicity        Dose:  50 mg   Take 1 tablet (50 mg) by mouth every 6 hours as needed for moderate pain   Quantity:  10 tablet   Refills:  0       triamcinolone 0.5 % cream   Commonly known as:  KENALOG   Used for:  Rash        Apply sparingly to affected area three times daily.   Quantity:  30 g   Refills:  1       VENTOLIN  (90 BASE) MCG/ACT Inhaler   Used for:  SOB (shortness of breath)   Generic drug:  albuterol        USE 2 PUFFS EVERY 6 HOURS AS NEEDED FOR SHORTNESS OF BREATH/DYSPNEA   Quantity:  18 each   Refills:  1       VITAMIN C PO        Dose:  500 mg   Take 500 mg by mouth daily   Refills:  0                Protect others around you: Learn how to safely use, store and throw away your medicines at www.disposemymeds.org.         Follow-ups after your visit        Your next 10 appointments already scheduled     Oct 18, 2017  1:15 PM CDT   (Arrive by 1:00 PM)   SHORT with Rubén Wren MD   [facility] ([facility] )    [address]  [place]  [facility] [address]   [phone]            Dec 08, 2017  2:00 PM CST   LAB with NA LAB   [facility] ([facility] )    [address]  [facility] [address]   [phone]            Dec 15, 2017  2:00 PM CST   (Arrive by 1:45 PM)   Return Visit with Radha Sales NP   [facility] ([facility] )    [address] [place] [address]   [phone]               Care Instructions        Further instructions from your care team       Home number on file [phone] (home)  Is it ok to leave a message at this number(s)? Yes    Dr. Ely completed your procedure on 10/13/2017.    Current Pain Level (0-10 Scale): 5/10  Post Pain Level (0-10):  0/10    Radiology Discharge instructions for Steroid Injection    Activity Level:     Do not do any heavy activity or exercise for 24 hours.   Do not drive for 4 hours after your injection.  Diet:   Return to your normal diet.  Medications:   If you have stopped taking your Aspirin, Coumadin/Warfarin, Ibuprofen, or any   other blood thinner for this procedure you may resume in the morning unless   your primary care provider has given you other instructions.    Diabetics may see an increase in blood sugar after steroid injections. If you are concerned about your blood sugar, please contact your family doctor.    Site Care:  Remove the bandage and bathe or shower the morning after the procedure.      Please allow two weeks to experience improvement in your pain.  If you have any further issues, please contact your provider.    Call your Primary Care Provider if you have the following (if your primary care provider is not available please seek emergency care):   Nausea with vomiting   Severe headache   Drowsiness or confusion   Redness or drainage at the injection or puncture site   Temperature over 101 degrees F   Other concerns   Worsening back pain   Stiff neck       Additional Information About  "Your Visit        [employer] Information     [employer] lets you send messages to your doctor, view your test results, renew your prescriptions, schedule appointments and more. To sign up, go to [email or web address] . Click on \"Log in\" on the left side of the screen, which will take you to the Welcome page. Then click on \"Sign up Now\" on the right side of the page.     You will be asked to enter the access code listed below, as well as some personal information. Please follow the directions to create your username and password.     Your access code is: [email or web address]  Expires: 2017  2:40 PM     Your access code will  in 90 days. If you need help or a new code, please call your [facility] clinic or [phone].        Care EveryWhere ID     This is your Care EveryWhere ID. This could be used by other organizations to access your [facility] medical records  [id number]         Primary Care Provider Office Phone # Fax #    Rubén Wren [phone]      Equal Access to Services     [facility]: Hadii luis manuel horne hadasho Sojack, waaxda luqadaha, qaybta kaalmamichael palmer, eileen snell . So [facility] [phone].    ATENCIÓN: Si habla español, tiene a dc disposición servicios gratuitos de asistencia lingüística. Jamin al [phone].    We comply with applicable federal civil rights laws and [place] laws. We do not discriminate on the basis of race, color, national origin, age, disability, sex, sexual orientation, or gender identity.            Thank you!     Thank you for choosing [facility] for your care. Our goal is always to provide you with excellent care. Hearing back from our patients is one way we can continue to improve our services. Please take a few minutes to complete the written survey that you may receive in the mail after you visit with us. Thank you!             Medication List: This is a list of all your medications and when to take them. Check marks " below indicate your daily home schedule. Keep this list as a reference.      Medications           Morning Afternoon Evening Bedtime As Needed    ALPRAZolam 0.5 MG tablet   Commonly known as:  XANAX   TAKE 1 OR 2 TABLETS BY MOUTH AT BEDTIME AS NEEDED                                atenolol 50 MG tablet   Commonly known as:  TENORMIN   TAKE 1 TABLET BY MOUTH DAILY                                gabapentin 300 MG capsule   Commonly known as:  NEURONTIN   Take 1 capsule (300 mg) by mouth At Bedtime                                HYDROcodone-acetaminophen 5-325 MG per tablet   Commonly known as:  NORCO   Take 1 tablet by mouth every 6 hours as needed for moderate to severe pain                                levothyroxine 75 MCG tablet   Commonly known as:  SYNTHROID/LEVOTHROID   TAKE 1 TABLET BY MOUTH DAILY                                MELATONIN PO   Take 5 mg by mouth At Bedtime                                omeprazole 20 MG CR capsule   Commonly known as:  priLOSEC   TAKE 1 CAPSULE BY MOUTH DAILY                                pravastatin 40 MG tablet   Commonly known as:  PRAVACHOL   TAKE 1 TABLET BY MOUTH EVERY MORNING AND 1 EVERY OTHER EVENING                                psyllium 58.6 % Powd   Commonly known as:  METAMUCIL   Take 1 teaspoonful by mouth daily                                sennosides 8.6 MG tablet   Commonly known as:  SENOKOT   Take 2 tablets by mouth At Bedtime Reported on 5/17/2017                                tamoxifen 20 MG tablet   Commonly known as:  NOLVADEX   Take 1 tablet (20 mg) by mouth daily                                tamsulosin 0.4 MG capsule   Commonly known as:  FLOMAX   TAKE 1 CAPSULE BY MOUTH DAILY                                traMADol 50 MG tablet   Commonly known as:  ULTRAM   Take 1 tablet (50 mg) by mouth every 6 hours as needed for moderate pain                                triamcinolone 0.5 % cream   Commonly known as:  KENALOG   Apply sparingly to affected  area three times daily.                                VENTOLIN  (90 BASE) MCG/ACT Inhaler   USE 2 PUFFS EVERY 6 HOURS AS NEEDED FOR SHORTNESS OF BREATH/DYSPNEA   Generic drug:  albuterol                                VITAMIN C PO   Take 500 mg by mouth daily

## 2017-10-13 NOTE — IP AVS SNAPSHOT
[facility]    [address] [address]    Phone:  [phone]    Fax:  [phone]                                       After Visit Summary   10/13/2017    Lio Daly    MRN: [id number]           After Visit Summary Signature Page     I have received my discharge instructions, and my questions have been answered. I have discussed any challenges I see with this plan with the nurse or doctor.    ..........................................................................................................................................  Patient/Patient Representative Signature      ..........................................................................................................................................  Patient Representative Print Name and Relationship to Patient    ..................................................               ................................................  Date                                            Time    ..........................................................................................................................................  Reviewed by Signature/Title    ...................................................              ..............................................  Date                                                            Time

## 2017-10-16 DIAGNOSIS — G47.00 PERSISTENT INSOMNIA: Primary | ICD-10-CM

## 2017-10-17 RX ORDER — ZOLPIDEM TARTRATE 5 MG/1
TABLET ORAL
Qty: 30 TABLET | Refills: 0 | Status: SHIPPED | OUTPATIENT
Start: 2017-10-17 | End: 2017-11-10

## 2017-10-17 NOTE — TELEPHONE ENCOUNTER
Refill request for Ahmet  Last refill 5/10/17 , qty #30, 5 refills by Katerina Beverly, CNP.  Not listed in current EPIC med list.  Notes having been DCd 7/10/17.  Last office visit 8/21/17

## 2017-10-18 DIAGNOSIS — C50.911 PRIMARY BREAST MALIGNANCY, RIGHT (H): ICD-10-CM

## 2017-10-20 DIAGNOSIS — K21.9 GASTROESOPHAGEAL REFLUX DISEASE, ESOPHAGITIS PRESENCE NOT SPECIFIED: ICD-10-CM

## 2017-10-20 RX ORDER — TAMOXIFEN CITRATE 20 MG/1
TABLET ORAL
Qty: 90 TABLET | Refills: 0 | Status: SHIPPED | OUTPATIENT
Start: 2017-10-20 | End: 2018-01-15

## 2017-10-23 NOTE — TELEPHONE ENCOUNTER
Prilosec      Last Written Prescription Date: 2/02/2017  Last Fill Quantity: 90,  # refills: 2   Last Office Visit with [facility], [facility] or [facility] prescribing provider: 8/21/2017                                         Next 5 appointments (look out 90 days)     Dec 15, 2017  2:00 PM CST   (Arrive by 1:45 PM)   Return Visit with Radha Sales NP   [facility] ([facility] )    [address] [place] [address]   [phone]

## 2017-10-26 DIAGNOSIS — E03.9 HYPOTHYROIDISM: ICD-10-CM

## 2017-10-26 DIAGNOSIS — M48.062 SPINAL STENOSIS OF LUMBAR REGION WITH NEUROGENIC CLAUDICATION: ICD-10-CM

## 2017-10-27 ENCOUNTER — TELEPHONE (OUTPATIENT)
Dept: INTERVENTIONAL RADIOLOGY/VASCULAR | Facility: HOSPITAL | Age: 82
End: 2017-10-27

## 2017-10-27 NOTE — TELEPHONE ENCOUNTER
POST CALL      Procedure: YOJANA T.F. bilateral  Radiologist(s): Dr. Christian Ely  Date of Procedure: 10/13/2017    I responded to the patient's questions/concerns.    Pre-procedure pain score was: 5 (See pre-procedure score)  Post-procedure pain score as of today is: 0(Ask in call)   Percentage of pain reduction: 100% (Calculate from patients' post procedure response--do not expect the patient to calculate)     Where is the pain? none  Is the pain radiating? No  Is this new pain? No  Patient would not like to pursue another injection at this time.  Patient will contact provider, Rubén Wren if there are any issues and for other recommendations as he needs.  REGGIE Gil

## 2017-10-27 NOTE — TELEPHONE ENCOUNTER
Gabapentin      Last Written Prescription Date: 8/21/2017  Last Fill Quantity: 30,  # refills: 3   Last Office Visit with [facility], [facility] or  Health prescribing provider: 8/21/2017                                         Next 5 appointments (look out 90 days)     Dec 15, 2017  2:00 PM CST   (Arrive by 1:45 PM)   Return Visit with Radha Sales NP   [facility] ([facility] )    [address] [place] [address]   [phone]                    Levothyroxine      Last Written Prescription Date: 12/22/2016  Last Fill Quantity: 30,  # refills: 11   Last Office Visit with [facility], [facility] or [facility] prescribing provider: 8/21/2017                                         Next 5 appointments (look out 90 days)     Dec 15, 2017  2:00 PM CST   (Arrive by 1:45 PM)   Return Visit with Radha Sales NP   [facility] ([facility] )    [address] [place] [address]   [phone]

## 2017-10-30 ENCOUNTER — ALLIED HEALTH/NURSE VISIT (OUTPATIENT)
Dept: FAMILY MEDICINE | Facility: OTHER | Age: 82
End: 2017-10-30
Attending: FAMILY MEDICINE
Payer: MEDICARE

## 2017-10-30 ENCOUNTER — TELEPHONE (OUTPATIENT)
Dept: FAMILY MEDICINE | Facility: OTHER | Age: 82
End: 2017-10-30

## 2017-10-30 DIAGNOSIS — Z23 NEED FOR PROPHYLACTIC VACCINATION AND INOCULATION AGAINST INFLUENZA: Primary | ICD-10-CM

## 2017-10-30 DIAGNOSIS — M54.16 LUMBAR RADICULOPATHY: Primary | ICD-10-CM

## 2017-10-30 PROCEDURE — G0008 ADMIN INFLUENZA VIRUS VAC: HCPCS

## 2017-10-30 PROCEDURE — 90662 IIV NO PRSV INCREASED AG IM: CPT

## 2017-10-30 RX ORDER — GABAPENTIN 300 MG/1
300 CAPSULE ORAL 2 TIMES DAILY
Qty: 180 CAPSULE | Refills: 1 | Status: SHIPPED | OUTPATIENT
Start: 2017-10-30 | End: 2018-02-06

## 2017-10-30 NOTE — MR AVS SNAPSHOT
After Visit Summary   10/30/2017    Lio Daly    MRN: [id number]           Patient Information     Date Of Birth          5/28/1934        Visit Information        Provider Department      10/30/2017 1:15 PM NA FP NURSE [facility]        Today's Diagnoses     Need for prophylactic vaccination and inoculation against influenza    -  1       Follow-ups after your visit        Your next 10 appointments already scheduled     Oct 30, 2017  1:15 PM CDT   (Arrive by 1:00 PM)   Nurse Only with NA FP NURSE   [facility] ([facility] )    [address]  [facility] [address]   [phone]            Dec 08, 2017  2:00 PM CST   LAB with NA LAB   [facility] ([facility] )    [address]  [facility] [address]   [phone]            Dec 15, 2017  2:00 PM CST   (Arrive by 1:45 PM)   Return Visit with Radha Sales NP   [facility] [place] ([facility] )    [address]  [place] [address]   [phone]              Who to contact     If you have questions or need follow up information about today's clinic visit or your schedule please contact [facility] directly at [phone].  Normal or non-critical lab and imaging results will be communicated to you by [employer], letter or phone within 4 business days after the clinic has received the results. If you do not hear from us within 7 days, please contact the clinic through MyChart or phone. If you have a critical or abnormal lab result, we will notify you by phone as soon as possible.  Submit refill requests through [employer] or call your pharmacy and they will forward the refill request to us. Please allow 3 business days for your refill to be completed.          Additional Information About Your Visit        HandMinder Information     [employer]Minder lets you send messages to your doctor, view your test results, renew your  "prescriptions, schedule appointments and more. To sign up, go to [email or web address] . Click on \"Log in\" on the left side of the screen, which will take you to the Welcome page. Then click on \"Sign up Now\" on the right side of the page.     You will be asked to enter the access code listed below, as well as some personal information. Please follow the directions to create your username and password.     Your access code is: [email or web address]  Expires: 2017  2:40 PM     Your access code will  in 90 days. If you need help or a new code, please call your [facility] clinic or [phone].        Care EveryWhere ID     This is your Care EveryWhere ID. This could be used by other organizations to access your [facility] medical records  [id number]         Blood Pressure from Last 3 Encounters:   17 114/53   17 141/74   17 132/83    Weight from Last 3 Encounters:   17 162 lb (73.5 kg)   17 169 lb (76.7 kg)   17 169 lb 9.6 oz (76.9 kg)              We Performed the Following     FLU VACCINE, INCREASED ANTIGEN, PRESV FREE, AGE 65+ [24634]     Vaccine Administration, Initial [75990]          Today's Medication Changes          These changes are accurate as of: 10/30/17  1:12 PM.  If you have any questions, ask your nurse or doctor.               These medicines have changed or have updated prescriptions.        Dose/Directions    tamsulosin 0.4 MG capsule   Commonly known as:  FLOMAX   This may have changed:  See the new instructions.   Used for:  BPH (benign prostatic hyperplasia)        TAKE 1 CAPSULE BY MOUTH DAILY   Quantity:  90 capsule   Refills:  3                Primary Care Provider Office Phone # Fax #    Rubén Wren [phone]       [address] [address]        Equal Access to Services     DEEP ROBERTSON AH: Hadii aad ku hadasho Soomaali, waaxda luqadaha, qaybta kaalmamichael palmer, eileen rae " lawilman hull. So [facility] [phone].    ATENCIÓN: Si habla axel, tiene a dc disposición servicios gratuitos de asistencia lingüística. Jamin hay [phone].    We comply with applicable federal civil rights laws and [place] laws. We do not discriminate on the basis of race, color, national origin, age, disability, sex, sexual orientation, or gender identity.            Thank you!     Thank you for choosing [facility]  for your care. Our goal is always to provide you with excellent care. Hearing back from our patients is one way we can continue to improve our services. Please take a few minutes to complete the written survey that you may receive in the mail after your visit with us. Thank you!             Your Updated Medication List - Protect others around you: Learn how to safely use, store and throw away your medicines at www.disposemymeds.org.          This list is accurate as of: 10/30/17  1:12 PM.  Always use your most recent med list.                   Brand Name Dispense Instructions for use Diagnosis    ALPRAZolam 0.5 MG tablet    XANAX    60 tablet    TAKE 1 OR 2 TABLETS BY MOUTH AT BEDTIME AS NEEDED    Anxiety       atenolol 50 MG tablet    TENORMIN    90 tablet    TAKE 1 TABLET BY MOUTH DAILY    Benign essential hypertension       gabapentin 300 MG capsule    NEURONTIN    30 capsule    Take 1 capsule (300 mg) by mouth At Bedtime    Spinal stenosis of lumbar region with neurogenic claudication       HYDROcodone-acetaminophen 5-325 MG per tablet    NORCO    30 tablet    Take 1 tablet by mouth every 6 hours as needed for moderate to severe pain    Chronic right shoulder pain, Malignant neoplasm of right male breast, unspecified estrogen receptor status, unspecified site of breast (H)       levothyroxine 75 MCG tablet    SYNTHROID/LEVOTHROID    30 tablet    TAKE 1 TABLET BY MOUTH DAILY    Hypothyroidism       MELATONIN PO      Take 5 mg by mouth At Bedtime        omeprazole 20 MG CR capsule     priLOSEC    90 capsule    TAKE 1 CAPSULE BY MOUTH DAILY    Gastroesophageal reflux disease, esophagitis presence not specified       pravastatin 40 MG tablet    PRAVACHOL    90 tablet    TAKE 1 TABLET BY MOUTH EVERY MORNING AND 1 EVERY OTHER EVENING    Hyperlipidemia LDL goal <130       psyllium 58.6 % Powd    METAMUCIL     Take 1 teaspoonful by mouth daily        sennosides 8.6 MG tablet    SENOKOT     Take 2 tablets by mouth At Bedtime Reported on 5/17/2017        * tamoxifen 20 MG tablet    NOLVADEX    90 tablet    Take 1 tablet (20 mg) by mouth daily    Malignant neoplasm of right male breast, unspecified site of breast       * tamoxifen 20 MG tablet    NOLVADEX    90 tablet    TAKE 1 TABLET BY MOUTH DAILY    Primary breast malignancy, right (H)       tamsulosin 0.4 MG capsule    FLOMAX    90 capsule    TAKE 1 CAPSULE BY MOUTH DAILY    BPH (benign prostatic hyperplasia)       traMADol 50 MG tablet    ULTRAM    10 tablet    Take 1 tablet (50 mg) by mouth every 6 hours as needed for moderate pain    Primary breast malignancy, right (H), Left shoulder pain, unspecified chronicity       triamcinolone 0.5 % cream    KENALOG    30 g    Apply sparingly to affected area three times daily.    Rash       VENTOLIN  (90 BASE) MCG/ACT Inhaler   Generic drug:  albuterol     18 each    USE 2 PUFFS EVERY 6 HOURS AS NEEDED FOR SHORTNESS OF BREATH/DYSPNEA    SOB (shortness of breath)       VITAMIN C PO      Take 500 mg by mouth daily        zolpidem 5 MG tablet    AMBIEN    30 tablet    TAKE 1 TABLET BY MOUTH NIGHTLY AS NEEDED FOR SLEEP    Persistent insomnia       * Notice:  This list has 2 medication(s) that are the same as other medications prescribed for you. Read the directions carefully, and ask your doctor or other care provider to review them with you.

## 2017-10-30 NOTE — PROGRESS NOTES

## 2017-10-30 NOTE — TELEPHONE ENCOUNTER
Pt is taking gabapentin 300 mg daily with some relief.  He is wondering if he can take 300 mg BID for foot pain, numbness and tingling?

## 2017-10-30 NOTE — TELEPHONE ENCOUNTER
Sent to mail order at [facility].  Warn about sedation.  If it makes him tired back to just at night.  Thanks.  Rubén Wren

## 2017-10-31 RX ORDER — LEVOTHYROXINE SODIUM 75 UG/1
TABLET ORAL
Qty: 90 TABLET | Refills: 0 | Status: SHIPPED | OUTPATIENT
Start: 2017-10-31 | End: 2018-02-12

## 2017-10-31 RX ORDER — GABAPENTIN 300 MG/1
CAPSULE ORAL
Qty: 90 CAPSULE | Refills: 0 | OUTPATIENT
Start: 2017-10-31

## 2017-11-10 DIAGNOSIS — F41.9 ANXIETY: ICD-10-CM

## 2017-11-10 DIAGNOSIS — G47.00 PERSISTENT INSOMNIA: ICD-10-CM

## 2017-11-13 DIAGNOSIS — E03.9 HYPOTHYROIDISM: ICD-10-CM

## 2017-11-13 RX ORDER — LEVOTHYROXINE SODIUM 75 UG/1
TABLET ORAL
Qty: 90 TABLET | Refills: 0 | OUTPATIENT
Start: 2017-11-13

## 2017-11-14 DIAGNOSIS — E78.5 HYPERLIPIDEMIA LDL GOAL <130: ICD-10-CM

## 2017-11-15 RX ORDER — ZOLPIDEM TARTRATE 5 MG/1
TABLET ORAL
Qty: 90 TABLET | Refills: 0 | Status: SHIPPED | OUTPATIENT
Start: 2017-11-15 | End: 2018-03-09

## 2017-11-15 RX ORDER — ALPRAZOLAM 0.5 MG
TABLET ORAL
Qty: 180 TABLET | Refills: 0 | Status: SHIPPED | OUTPATIENT
Start: 2017-11-15 | End: 2018-03-08

## 2017-11-15 NOTE — TELEPHONE ENCOUNTER
Xanax  Last office visit: 08/21/17  Last refill: 08/21/17 #60 with 3 refills    Thank you.    Ahmet  Last office visit: 08/21/17  Last refill: 10/17/17 #30    Thank you.

## 2017-11-16 RX ORDER — PRAVASTATIN SODIUM 40 MG
TABLET ORAL
Qty: 135 TABLET | Refills: 0 | Status: SHIPPED | OUTPATIENT
Start: 2017-11-16 | End: 2018-05-07

## 2017-11-16 NOTE — TELEPHONE ENCOUNTER
Prevastatin  Last office visit: 8/21/17  Patient due for lab.  10/11/16 last FLP.  Patient has refill to last until January 12 .  Pharmacy requesting additional refills.  Last refill: 5/12/17 #90, 3 R.  Medication pended.  Please advise.  Thank you.

## 2017-12-08 DIAGNOSIS — C50.911 PRIMARY BREAST MALIGNANCY, RIGHT (H): ICD-10-CM

## 2017-12-08 DIAGNOSIS — M25.512 LEFT SHOULDER PAIN, UNSPECIFIED CHRONICITY: ICD-10-CM

## 2017-12-08 LAB
ALBUMIN SERPL-MCNC: 3.8 G/DL (ref 3.4–5)
ALP SERPL-CCNC: 73 U/L (ref 40–150)
ALT SERPL W P-5'-P-CCNC: 22 U/L (ref 0–70)
ANION GAP SERPL CALCULATED.3IONS-SCNC: 8 MMOL/L (ref 3–14)
AST SERPL W P-5'-P-CCNC: 20 U/L (ref 0–45)
BASOPHILS # BLD AUTO: 0 10E9/L (ref 0–0.2)
BASOPHILS NFR BLD AUTO: 0.5 %
BILIRUB SERPL-MCNC: 0.3 MG/DL (ref 0.2–1.3)
BUN SERPL-MCNC: 27 MG/DL (ref 7–30)
CALCIUM SERPL-MCNC: 9 MG/DL (ref 8.5–10.1)
CHLORIDE SERPL-SCNC: 106 MMOL/L (ref 94–109)
CO2 SERPL-SCNC: 24 MMOL/L (ref 20–32)
CREAT SERPL-MCNC: 0.96 MG/DL (ref 0.66–1.25)
DIFFERENTIAL METHOD BLD: ABNORMAL
EOSINOPHIL # BLD AUTO: 0.5 10E9/L (ref 0–0.7)
EOSINOPHIL NFR BLD AUTO: 6.3 %
ERYTHROCYTE [DISTWIDTH] IN BLOOD BY AUTOMATED COUNT: 14.4 % (ref 10–15)
GFR SERPL CREATININE-BSD FRML MDRD: 75 ML/MIN/1.7M2
GLUCOSE SERPL-MCNC: 111 MG/DL (ref 70–99)
HCT VFR BLD AUTO: 37.2 % (ref 40–53)
HGB BLD-MCNC: 12.1 G/DL (ref 13.3–17.7)
LDH SERPL L TO P-CCNC: 197 U/L (ref 85–227)
LYMPHOCYTES # BLD AUTO: 2.6 10E9/L (ref 0.8–5.3)
LYMPHOCYTES NFR BLD AUTO: 31.1 %
MCH RBC QN AUTO: 30 PG (ref 26.5–33)
MCHC RBC AUTO-ENTMCNC: 32.5 G/DL (ref 31.5–36.5)
MCV RBC AUTO: 92 FL (ref 78–100)
MONOCYTES # BLD AUTO: 0.7 10E9/L (ref 0–1.3)
MONOCYTES NFR BLD AUTO: 8.5 %
NEUTROPHILS # BLD AUTO: 4.4 10E9/L (ref 1.6–8.3)
NEUTROPHILS NFR BLD AUTO: 53.6 %
PLATELET # BLD AUTO: 276 10E9/L (ref 150–450)
POTASSIUM SERPL-SCNC: 4.6 MMOL/L (ref 3.4–5.3)
PROT SERPL-MCNC: 7.5 G/DL (ref 6.8–8.8)
RBC # BLD AUTO: 4.04 10E12/L (ref 4.4–5.9)
SODIUM SERPL-SCNC: 138 MMOL/L (ref 133–144)
WBC # BLD AUTO: 8.3 10E9/L (ref 4–11)

## 2017-12-08 PROCEDURE — 85025 COMPLETE CBC W/AUTO DIFF WBC: CPT | Mod: ZL | Performed by: NURSE PRACTITIONER

## 2017-12-08 PROCEDURE — 99000 SPECIMEN HANDLING OFFICE-LAB: CPT | Performed by: NURSE PRACTITIONER

## 2017-12-08 PROCEDURE — 80053 COMPREHEN METABOLIC PANEL: CPT | Mod: ZL | Performed by: NURSE PRACTITIONER

## 2017-12-08 PROCEDURE — 86300 IMMUNOASSAY TUMOR CA 15-3: CPT | Mod: ZL | Performed by: NURSE PRACTITIONER

## 2017-12-08 PROCEDURE — 83615 LACTATE (LD) (LDH) ENZYME: CPT | Mod: ZL | Performed by: NURSE PRACTITIONER

## 2017-12-08 PROCEDURE — 36415 COLL VENOUS BLD VENIPUNCTURE: CPT | Mod: ZL | Performed by: NURSE PRACTITIONER

## 2017-12-11 LAB — CANCER AG27-29 SERPL-ACNC: 6 U/ML

## 2017-12-15 ENCOUNTER — ONCOLOGY VISIT (OUTPATIENT)
Dept: ONCOLOGY | Facility: OTHER | Age: 82
End: 2017-12-15
Attending: NURSE PRACTITIONER
Payer: COMMERCIAL

## 2017-12-15 VITALS
TEMPERATURE: 97.2 F | RESPIRATION RATE: 16 BRPM | HEART RATE: 69 BPM | SYSTOLIC BLOOD PRESSURE: 140 MMHG | HEIGHT: 67 IN | WEIGHT: 172.8 LBS | DIASTOLIC BLOOD PRESSURE: 73 MMHG | BODY MASS INDEX: 27.12 KG/M2 | OXYGEN SATURATION: 97 %

## 2017-12-15 DIAGNOSIS — I89.0 LYMPHEDEMA: ICD-10-CM

## 2017-12-15 DIAGNOSIS — C50.921 MALIGNANT NEOPLASM OF RIGHT MALE BREAST, UNSPECIFIED ESTROGEN RECEPTOR STATUS, UNSPECIFIED SITE OF BREAST (H): Primary | ICD-10-CM

## 2017-12-15 PROCEDURE — 99212 OFFICE O/P EST SF 10 MIN: CPT

## 2017-12-15 PROCEDURE — 99213 OFFICE O/P EST LOW 20 MIN: CPT | Performed by: NURSE PRACTITIONER

## 2017-12-15 ASSESSMENT — PATIENT HEALTH QUESTIONNAIRE - PHQ9: SUM OF ALL RESPONSES TO PHQ QUESTIONS 1-9: 0

## 2017-12-15 NOTE — PROGRESS NOTES
Oncology Follow-up Visit:  December 14, 2017    Reason for Visit:  Patient presents with:  RECHECK: maligant neoplasm of right male breast     Nursing Note and documentation reviewed: yes    HPI:  This is an 83-year-old male patient who presents to the oncology/hematology clinic today in follow up of right breast cancer.  Patient underwent a right mastectomy with axillary node dissection in July 2015. He was placed on 20 mg of tamoxifen and remains on this.     He presents to the clinic today with no complaints.  He states his right shoulder is much improved as he did receive a cortisone injection from his PCP.  He uses a cane but only for stability at this time.  He has no new areas of bone pain, denies any hot flashes or night sweats and states his weight has been stable.  He is dealing with his depression well and does discuss missing his wife.  He continues with right upper extremity lymphedema and does continue with exercises and states that this is much improved in the a.m.  He is questioning if he can have a new compression sleeve ordered today.    Oncologic History:  Patient had presented with complaints of right nipple irritation over a six-month period and was noted to have a mass in the right breast. Mammogram was completed followed by biopsy and pathology showed an invasive carcinoma. On 7/27/2015, he underwent a right modified radical mastectomy with axillary node dissection by Dr. Garsia. 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 DCIS was present, grade 2 with expansive comedonecrosis. Node dissection revealed 1/17 nodes revealing micrometastasis. Tumor was ER/DE positive, HER-2/thomas negative. Patient was staged pathologic T1c N1mi M0.      At consultation, patient was not interested in adjuvant chemotherapy and was placed on tamoxifen 20 mg daily.      BRCA 2+      Current Chemo  Regime/TX: Tamoxifen 20mg daily  Current Cycle: n/a  # of completed cycles: n/a      Previous treatment:  n/a    Past Medical History:   Diagnosis Date     Dysphagia 06/28/2004     HTN (hypertension)      Need for prophylactic vaccination and inoculation 12/10/2001     Need for prophylactic vaccination and inoculation, 11/06/2003     Other and unspecified hyperlipidemia 05/01/2001     Prediabetes      Shortness of breath 03/28/2006     Shoulder pain 06/15/2012     Unspecified essential hypertension 05/18/2000       Past Surgical History:   Procedure Laterality Date     CARDIAC SURGERY      angiogram 1992 [facility]     COLONOSCOPY      1998     GENITOURINARY SURGERY      prostate bx [place]     GENITOURINARY SURGERY      Prostate bx [facility]     GI SURGERY      EGD     GI SURGERY      Pt has EGD with removal of food from esophagus     MASTECTOMY SIMPLE, SENTINEL NODE, COMBINED Right 7/27/2015    Procedure: COMBINED MASTECTOMY SIMPLE, SENTINEL NODE;  Surgeon: Aliyah Garsia MD;  Location: [facility]     ORTHOPEDIC SURGERY      bilateral knee replacements       Family History   Problem Relation Age of Onset     Cancer - colorectal Mother      Eye Disorder Mother      cataracts     Musculoskeletal Disorder Mother      CTS     CANCER Father      lung cancer     Genitourinary Problems Father      prostate problems     C.A.D. Brother      CABG     Eye Disorder Brother      cataracts     Breast Cancer Other      Ovarian Cancer Other      Pancreatic Cancer Other        Social History     Social History     Marital status:      Spouse name: Marisa     Number of children: N/A     Years of education: N/A     Occupational History     Not on file.     Social History Main Topics     Smoking status: Former Smoker     Types: Cigarettes     Smokeless tobacco: Never Used      Comment: tried to quit-yes     Alcohol use No      Comment: former     Drug use: No     Sexual activity: Not on file     Other Topics Concern     Caffeine  Concern Yes     coffee, 2 cups daily     Parent/Sibling W/ Cabg, Mi Or Angioplasty Before 65f 55m? No     Social History Narrative       Current Outpatient Prescriptions   Medication     order for DME     pravastatin (PRAVACHOL) 40 MG tablet     ALPRAZolam (XANAX) 0.5 MG tablet     zolpidem (AMBIEN) 5 MG tablet     levothyroxine (SYNTHROID/LEVOTHROID) 75 MCG tablet     gabapentin (NEURONTIN) 300 MG capsule     omeprazole (PRILOSEC) 20 MG CR capsule     tamoxifen (NOLVADEX) 20 MG tablet     MELATONIN PO     atenolol (TENORMIN) 50 MG tablet     tamsulosin (FLOMAX) 0.4 MG capsule     tamoxifen (NOLVADEX) 20 MG tablet     sennosides (SENOKOT) 8.6 MG tablet     Ascorbic Acid (VITAMIN C PO)     [DISCONTINUED] gabapentin (NEURONTIN) 300 MG capsule     traMADol (ULTRAM) 50 MG tablet     HYDROcodone-acetaminophen (NORCO) 5-325 MG per tablet     triamcinolone (KENALOG) 0.5 % cream     psyllium (METAMUCIL) 58.6 % POWD     VENTOLIN  (90 BASE) MCG/ACT inhaler     No current facility-administered medications for this visit.         No Known Allergies    Review Of Systems:  Constitutional: denies fever, weight changes, chills, and night sweats.  Eyes: denies blurred or double vision  Ears/Nose/Throat: denies ear pain, nose problems, difficulty swallowing  Respiratory: denies shortness of breath, cough   Skin: denies rash, lesions  Breast/Chest wall: denies pain, lumps or discharge  Cardiovascular: denies chest pain, palpitations, edema  Gastrointestinal: denies abdominal pain, bloating, nausea, vomiting, early satiety  Genitourinary: denies difficulty with urination, blood in urine  Musculoskeletal:denies new muscle pain, bone pain  Neurologic: denies lightheadedness, headaches, numbness or tingling  Psychiatric: see hPI  Hematologic/Lymphatic/Immunologic: denies easy bruising, easy bleeding, lumps or bumps noted  Endocrine: Denies increased thirst    Physical Exam:  /73  Pulse 69  Temp 97.2  F (36.2  C) (Oral)   "Resp 16  Ht 1.702 m (5' 7\")  Wt 78.4 kg (172 lb 12.8 oz)  SpO2 97%  BMI 27.06 kg/m2    GENERAL APPEARANCE: Healthy, alert and in no acute distress.  HEENT: Normocephalic, Sclerae anicteric. Oropharynx without ulcers, lesions, or thrush.  NECK: Supple. No asymmetry or masses, no thyromegaly.  LYMPHATICS: No palpable cervical, supraclavicular, axillary, or inguinal nodes   RESP: Lungs clear to auscultation bilaterally, respirations regular and easy  CARDIOVASCULAR: Regular rate and rhythm. Normal S1, S2; no murmur, gallop, or rub.  ABDOMEN: Soft, nontender. Bowel sounds auscultated all 4 quadrants. No palpable organomegaly or masses.  BREAST/Chest wall: no concerning lumps, masses or tenderness bilaterally  MUSCULOSKELETAL: Extremities without gross deformities noted. No edema of bilateral lower extremities.  SKIN: No suspicious lesions or rashes.  NEURO: Alert and oriented x 3.  Gait steady.  PSYCHIATRIC: Mentation and affect appear normal.  Mood appropriate.    Laboratory:  Results for orders placed or performed in visit on 12/08/17   Lactate Dehydrogenase   Result Value Ref Range    Lactate Dehydrogenase 197 85 - 227 U/L   Comprehensive metabolic panel   Result Value Ref Range    Sodium 138 133 - 144 mmol/L    Potassium 4.6 3.4 - 5.3 mmol/L    Chloride 106 94 - 109 mmol/L    Carbon Dioxide 24 20 - 32 mmol/L    Anion Gap 8 3 - 14 mmol/L    Glucose 111 (H) 70 - 99 mg/dL    Urea Nitrogen 27 7 - 30 mg/dL    Creatinine 0.96 0.66 - 1.25 mg/dL    GFR Estimate 75 >60 mL/min/1.7m2    GFR Estimate If Black >90 >60 mL/min/1.7m2    Calcium 9.0 8.5 - 10.1 mg/dL    Bilirubin Total 0.3 0.2 - 1.3 mg/dL    Albumin 3.8 3.4 - 5.0 g/dL    Protein Total 7.5 6.8 - 8.8 g/dL    Alkaline Phosphatase 73 40 - 150 U/L    ALT 22 0 - 70 U/L    AST 20 0 - 45 U/L   CBC with platelets differential   Result Value Ref Range    WBC 8.3 4.0 - 11.0 10e9/L    RBC Count 4.04 (L) 4.4 - 5.9 10e12/L    Hemoglobin 12.1 (L) 13.3 - 17.7 g/dL    Hematocrit " 37.2 (L) 40.0 - 53.0 %    MCV 92 78 - 100 fl    MCH 30.0 26.5 - 33.0 pg    MCHC 32.5 31.5 - 36.5 g/dL    RDW 14.4 10.0 - 15.0 %    Platelet Count 276 150 - 450 10e9/L    Diff Method Automated Method     % Neutrophils 53.6 %    % Lymphocytes 31.1 %    % Monocytes 8.5 %    % Eosinophils 6.3 %    % Basophils 0.5 %    Absolute Neutrophil 4.4 1.6 - 8.3 10e9/L    Absolute Lymphocytes 2.6 0.8 - 5.3 10e9/L    Absolute Monocytes 0.7 0.0 - 1.3 10e9/L    Absolute Eosinophils 0.5 0.0 - 0.7 10e9/L    Absolute Basophils 0.0 0.0 - 0.2 10e9/L   Ca27.29  breast tumor marker   Result Value Ref Range    CA 27-29 6 U/mL       Imaging Studies:  None for today      ASSESSMENT/PLAN:    #1  Breast cancer:  Diagnosed with Stage 1, pT1c N1mi M0 carcinoma of the right breast in July 2015.  He underwent mastectomy with node dissection with 1/17 nodes revealing micrometastases; tumor was 2cm, ER/NC positive, HER-2/thomas negative.  He'll continue with tamoxifen 20 mg daily and follow up in 4 months with a CBC, CMP and CA 27.29.     #2  Lymphedema:  Will send a prescription for new sleeve and glove.  He will continue with exercises.  Discussed possible need for re measurements with Heather Strong if needed.    I encouraged patient to call with any questions or concerns.     Radha Sales  [facility]

## 2017-12-15 NOTE — PATIENT INSTRUCTIONS
We would like to see you back in 4 months. Please come 1week(s) prior for lab work.  Your prescription has been sent to:   [facility] - [address]  [address] [address]  Phone: [phone] Fax: [phone]  When you are in need of a refill of your medications, please call your pharmacy and they will send us the request. If you have any questions please call [phone]

## 2017-12-15 NOTE — MR AVS SNAPSHOT
"              After Visit Summary   12/15/2017    Lio Daly    MRN: [id number]           Patient Information     Date Of Birth          5/28/1934        Visit Information        Provider Department      12/15/2017 2:00 PM Radha Sales NP [facility]         Follow-ups after your visit        Your next 10 appointments already scheduled     Apr 13, 2018  2:00 PM CDT   LAB with NA LAB   [facility] ([facility] )    [address]  [facility] [address]   [phone]            Apr 20, 2018  2:00 PM CDT   (Arrive by 1:45 PM)   Return Visit with Radha Sales NP   [facility] [place] ([facility] [place] )    [address]  [place] [address] [phone]              Who to contact     If you have questions or need follow up information about today's clinic visit or your schedule please contact [facility] directly at [phone].  Normal or non-critical lab and imaging results will be communicated to you by MyChart, letter or phone within 4 business days after the clinic has received the results. If you do not hear from us within 7 days, please contact the clinic through [employer] or phone. If you have a critical or abnormal lab result, we will notify you by phone as soon as possible.  Submit refill requests through [employer] or call your pharmacy and they will forward the refill request to us. Please allow 3 business days for your refill to be completed.          Additional Information About Your Visit        MyChart Information     [employer] lets you send messages to your doctor, view your test results, renew your prescriptions, schedule appointments and more. To sign up, go to [email or web address] . Click on \"Log in\" on the left side of the screen, which will take you to the Welcome page. Then click on \"Sign up Now\" on the right side of the page.     You will be asked to enter the access code listed below, " "as well as some personal information. Please follow the directions to create your username and password.     Your access code is: [email or web address]  Expires: 3/15/2018  2:46 PM     Your access code will  in 90 days. If you need help or a new code, please call your [facility] or [phone].        Care EveryWhere ID     This is your Care EveryWhere ID. This could be used by other organizations to access your [place] medical records  [id number]        Your Vitals Were     Pulse Temperature Respirations Height Pulse Oximetry BMI (Body Mass Index)    69 97.2  F (36.2  C) (Oral) 16 1.702 m (5' 7\") 97% 27.06 kg/m2       Blood Pressure from Last 3 Encounters:   12/15/17 140/73   17 114/53   17 141/74    Weight from Last 3 Encounters:   12/15/17 78.4 kg (172 lb 12.8 oz)   17 73.5 kg (162 lb)   17 76.7 kg (169 lb)              Today, you had the following     No orders found for display         Today's Medication Changes          These changes are accurate as of: 12/15/17  2:47 PM.  If you have any questions, ask your nurse or doctor.               These medicines have changed or have updated prescriptions.        Dose/Directions    tamsulosin 0.4 MG capsule   Commonly known as:  FLOMAX   This may have changed:  See the new instructions.   Used for:  BPH (benign prostatic hyperplasia)        TAKE 1 CAPSULE BY MOUTH DAILY   Quantity:  90 capsule   Refills:  3                Primary Care Provider Office Phone # Fax #    Rubén Wren [phone]       [facility] [address]  [facility] [address]        Equal Access to Services     DEEP ROBERTSON : Hadii luis manuel Higginbotham, wajoseda luqadaha, qaybta kaalmada spencer, eileen hull. So [facility] [phone].    ATENCIÓN: Si habla español, tiene a dc disposición servicios gratuitos de asistencia lingüística. Llame al [phone].    We comply with applicable federal civil rights laws " and [place] laws. We do not discriminate on the basis of race, color, national origin, age, disability, sex, sexual orientation, or gender identity.            Thank you!     Thank you for choosing [facility]  for your care. Our goal is always to provide you with excellent care. Hearing back from our patients is one way we can continue to improve our services. Please take a few minutes to complete the written survey that you may receive in the mail after your visit with us. Thank you!             Your Updated Medication List - Protect others around you: Learn how to safely use, store and throw away your medicines at www.disposemymeds.org.          This list is accurate as of: 12/15/17  2:47 PM.  Always use your most recent med list.                   Brand Name Dispense Instructions for use Diagnosis    ALPRAZolam 0.5 MG tablet    XANAX    180 tablet    TAKE 1 OR 2 TABLETS BY MOUTH AT BEDTIME AS NEEDED    Anxiety       atenolol 50 MG tablet    TENORMIN    90 tablet    TAKE 1 TABLET BY MOUTH DAILY    Benign essential hypertension       gabapentin 300 MG capsule    NEURONTIN    180 capsule    Take 1 capsule (300 mg) by mouth 2 times daily    Lumbar radiculopathy       HYDROcodone-acetaminophen 5-325 MG per tablet    NORCO    30 tablet    Take 1 tablet by mouth every 6 hours as needed for moderate to severe pain    Chronic right shoulder pain, Malignant neoplasm of right male breast, unspecified estrogen receptor status, unspecified site of breast (H)       levothyroxine 75 MCG tablet    SYNTHROID/LEVOTHROID    90 tablet    TAKE 1 TABLET BY MOUTH DAILY    Hypothyroidism       MELATONIN PO      Take 5 mg by mouth At Bedtime        omeprazole 20 MG CR capsule    priLOSEC    90 capsule    TAKE 1 CAPSULE BY MOUTH DAILY    Gastroesophageal reflux disease, esophagitis presence not specified       pravastatin 40 MG tablet    PRAVACHOL    135 tablet    TAKE 1 TABLET BY MOUTH EVERY MORNING AND 1 EVERY OTHER  EVENING    Hyperlipidemia LDL goal <130       psyllium 58.6 % Powd    METAMUCIL     Take 1 teaspoonful by mouth daily        sennosides 8.6 MG tablet    SENOKOT     Take 2 tablets by mouth At Bedtime Reported on 5/17/2017        * tamoxifen 20 MG tablet    NOLVADEX    90 tablet    Take 1 tablet (20 mg) by mouth daily    Malignant neoplasm of right male breast, unspecified site of breast       * tamoxifen 20 MG tablet    NOLVADEX    90 tablet    TAKE 1 TABLET BY MOUTH DAILY    Primary breast malignancy, right (H)       tamsulosin 0.4 MG capsule    FLOMAX    90 capsule    TAKE 1 CAPSULE BY MOUTH DAILY    BPH (benign prostatic hyperplasia)       traMADol 50 MG tablet    ULTRAM    10 tablet    Take 1 tablet (50 mg) by mouth every 6 hours as needed for moderate pain    Primary breast malignancy, right (H), Left shoulder pain, unspecified chronicity       triamcinolone 0.5 % cream    KENALOG    30 g    Apply sparingly to affected area three times daily.    Rash       VENTOLIN  (90 BASE) MCG/ACT Inhaler   Generic drug:  albuterol     18 each    USE 2 PUFFS EVERY 6 HOURS AS NEEDED FOR SHORTNESS OF BREATH/DYSPNEA    SOB (shortness of breath)       VITAMIN C PO      Take 500 mg by mouth daily        zolpidem 5 MG tablet    AMBIEN    90 tablet    TAKE 1 TABLET BY MOUTH NIGHTLY AS NEEDED FOR SLEEP    Persistent insomnia       * Notice:  This list has 2 medication(s) that are the same as other medications prescribed for you. Read the directions carefully, and ask your doctor or other care provider to review them with you.

## 2017-12-15 NOTE — NURSING NOTE
"Chief Complaint   Patient presents with     RECHECK     maligant neoplasm of right male breast       Initial /73  Pulse 69  Temp 97.2  F (36.2  C) (Oral)  Resp 16  Ht 1.702 m (5' 7\")  Wt 78.4 kg (172 lb 12.8 oz)  SpO2 97%  BMI 27.06 kg/m2 Estimated body mass index is 27.06 kg/(m^2) as calculated from the following:    Height as of this encounter: 1.702 m (5' 7\").    Weight as of this encounter: 78.4 kg (172 lb 12.8 oz).  Medication Reconciliation: complete     Janina Au      "

## 2017-12-18 ENCOUNTER — TELEPHONE (OUTPATIENT)
Dept: ONCOLOGY | Facility: OTHER | Age: 82
End: 2017-12-18

## 2017-12-18 DIAGNOSIS — I89.0 LYMPHEDEMA: Primary | ICD-10-CM

## 2018-01-08 DIAGNOSIS — N40.0 BENIGN PROSTATIC HYPERPLASIA, UNSPECIFIED WHETHER LOWER URINARY TRACT SYMPTOMS PRESENT: Primary | ICD-10-CM

## 2018-01-08 DIAGNOSIS — N40.0 BPH (BENIGN PROSTATIC HYPERPLASIA): ICD-10-CM

## 2018-01-08 NOTE — TELEPHONE ENCOUNTER
Patient called and stated he was taking flomax 2x per day as instructed 6 weeks ago by Dr. Wren. He called in refill on the 2nd and is out of medication. Also wants to get qty 180 as this would be a 90 day supply if he is taking bid.

## 2018-01-10 RX ORDER — TAMSULOSIN HYDROCHLORIDE 0.4 MG/1
CAPSULE ORAL
Qty: 90 CAPSULE | Refills: 0 | OUTPATIENT
Start: 2018-01-10

## 2018-01-10 RX ORDER — TAMSULOSIN HYDROCHLORIDE 0.4 MG/1
CAPSULE ORAL
Qty: 180 CAPSULE | Refills: 1 | Status: SHIPPED | OUTPATIENT
Start: 2018-01-10 | End: 2018-07-09

## 2018-01-10 NOTE — TELEPHONE ENCOUNTER
Flomax - Patient is stating he takes 2 capsules now.  Please advise. Thank you  Last visit: 8.21.17  Last refill: 5.12.17 #90 /3 refills

## 2018-01-15 DIAGNOSIS — C50.911 PRIMARY BREAST MALIGNANCY, RIGHT (H): ICD-10-CM

## 2018-01-17 RX ORDER — TAMOXIFEN CITRATE 20 MG/1
TABLET ORAL
Qty: 90 TABLET | Refills: 1 | Status: SHIPPED | OUTPATIENT
Start: 2018-01-17 | End: 2018-08-03

## 2018-01-17 NOTE — TELEPHONE ENCOUNTER
tamoxifen      Last Written Prescription Date:  10/20/17  Last Fill Quantity: 90,   # refills: 0  Last Office Visit: 12/15/17  Future Office visit:none

## 2018-02-01 ENCOUNTER — OFFICE VISIT (OUTPATIENT)
Dept: FAMILY MEDICINE | Facility: OTHER | Age: 83
End: 2018-02-01
Attending: FAMILY MEDICINE
Payer: MEDICARE

## 2018-02-01 VITALS
OXYGEN SATURATION: 97 % | SYSTOLIC BLOOD PRESSURE: 136 MMHG | HEART RATE: 76 BPM | WEIGHT: 175 LBS | TEMPERATURE: 99.4 F | BODY MASS INDEX: 27.47 KG/M2 | DIASTOLIC BLOOD PRESSURE: 74 MMHG | HEIGHT: 67 IN

## 2018-02-01 DIAGNOSIS — I10 HTN, GOAL BELOW 140/80: ICD-10-CM

## 2018-02-01 DIAGNOSIS — R21 RASH: ICD-10-CM

## 2018-02-01 DIAGNOSIS — M25.511 RIGHT SHOULDER PAIN, UNSPECIFIED CHRONICITY: Primary | ICD-10-CM

## 2018-02-01 DIAGNOSIS — E03.8 OTHER SPECIFIED HYPOTHYROIDISM: ICD-10-CM

## 2018-02-01 PROCEDURE — 20610 DRAIN/INJ JOINT/BURSA W/O US: CPT | Mod: RT,GZ | Performed by: FAMILY MEDICINE

## 2018-02-01 PROCEDURE — 99213 OFFICE O/P EST LOW 20 MIN: CPT | Mod: 25 | Performed by: FAMILY MEDICINE

## 2018-02-01 PROCEDURE — G0463 HOSPITAL OUTPT CLINIC VISIT: HCPCS | Mod: 25

## 2018-02-01 RX ORDER — LIDOCAINE HYDROCHLORIDE 10 MG/ML
5 INJECTION, SOLUTION INFILTRATION; PERINEURAL ONCE
Qty: 5 ML | Refills: 0 | OUTPATIENT
Start: 2018-02-01 | End: 2018-02-01

## 2018-02-01 RX ORDER — TRIAMCINOLONE ACETONIDE 5 MG/G
CREAM TOPICAL
Qty: 30 G | Refills: 1 | Status: SHIPPED | OUTPATIENT
Start: 2018-02-01 | End: 2018-12-03

## 2018-02-01 RX ORDER — TRIAMCINOLONE ACETONIDE 40 MG/ML
80 INJECTION, SUSPENSION INTRA-ARTICULAR; INTRAMUSCULAR ONCE
Qty: 2 ML | Refills: 0 | OUTPATIENT
Start: 2018-02-01 | End: 2018-02-01

## 2018-02-01 ASSESSMENT — ANXIETY QUESTIONNAIRES
6. BECOMING EASILY ANNOYED OR IRRITABLE: SEVERAL DAYS
GAD7 TOTAL SCORE: 2
IF YOU CHECKED OFF ANY PROBLEMS ON THIS QUESTIONNAIRE, HOW DIFFICULT HAVE THESE PROBLEMS MADE IT FOR YOU TO DO YOUR WORK, TAKE CARE OF THINGS AT HOME, OR GET ALONG WITH OTHER PEOPLE: NOT DIFFICULT AT ALL
4. TROUBLE RELAXING: NOT AT ALL
5. BEING SO RESTLESS THAT IT IS HARD TO SIT STILL: NOT AT ALL
2. NOT BEING ABLE TO STOP OR CONTROL WORRYING: NOT AT ALL
3. WORRYING TOO MUCH ABOUT DIFFERENT THINGS: SEVERAL DAYS
7. FEELING AFRAID AS IF SOMETHING AWFUL MIGHT HAPPEN: NOT AT ALL
1. FEELING NERVOUS, ANXIOUS, OR ON EDGE: NOT AT ALL

## 2018-02-01 ASSESSMENT — PAIN SCALES - GENERAL: PAINLEVEL: MODERATE PAIN (5)

## 2018-02-01 NOTE — PROGRESS NOTES
Lio Daly    February 1, 2018    Chief Complaint   Patient presents with     Shoulder Pain     Pt is requesting a steroid injection right shoulder, pain x 3 weeks       SUBJECTIVE:  Here for right shoulder pain recurrence.  He would like a shot again.  It worked for almost 5 months.  He was very pleased.  He is due for some maintenance labs for chronic conditions.  See below.     Past Medical History:   Diagnosis Date     Dysphagia 06/28/2004     HTN (hypertension)      Need for prophylactic vaccination and inoculation 12/10/2001     Need for prophylactic vaccination and inoculation, 11/06/2003     Other and unspecified hyperlipidemia 05/01/2001     Prediabetes      Shortness of breath 03/28/2006     Shoulder pain 06/15/2012     Unspecified essential hypertension 05/18/2000       Past Surgical History:   Procedure Laterality Date     CARDIAC SURGERY      angiogram 1992 [facility]     COLONOSCOPY      1998     GENITOURINARY SURGERY      prostate bx [place]     GENITOURINARY SURGERY      Prostate bx [facility]     GI SURGERY      EGD     GI SURGERY      Pt has EGD with removal of food from esophagus     MASTECTOMY SIMPLE, SENTINEL NODE, COMBINED Right 7/27/2015    Procedure: COMBINED MASTECTOMY SIMPLE, SENTINEL NODE;  Surgeon: Aliyah Garsia MD;  Location: [facility] OR     ORTHOPEDIC SURGERY      bilateral knee replacements       Current Outpatient Prescriptions   Medication Sig Dispense Refill     triamcinolone (KENALOG) 0.5 % cream Apply sparingly to affected area three times daily. 30 g 1     lidocaine 1 % injection 5 mLs by INTRA-ARTICULAR route once for 1 dose 5 mL 0     triamcinolone acetonide (KENALOG) 40 MG/ML injection 2 mLs (80 mg) by INTRA-ARTICULAR route once for 1 dose 2 mL 0     tamoxifen (NOLVADEX) 20 MG tablet TAKE 1 TABLET BY MOUTH DAILY 90 tablet 1     tamsulosin (FLOMAX) 0.4 MG capsule One capsule in morning and one at night 180 capsule 1     order for DME Equipment being ordered: right hand  compression glove 1 each 0     pravastatin (PRAVACHOL) 40 MG tablet TAKE 1 TABLET BY MOUTH EVERY MORNING AND 1 EVERY OTHER EVENING 135 tablet 0     ALPRAZolam (XANAX) 0.5 MG tablet TAKE 1 OR 2 TABLETS BY MOUTH AT BEDTIME AS NEEDED 180 tablet 0     zolpidem (AMBIEN) 5 MG tablet TAKE 1 TABLET BY MOUTH NIGHTLY AS NEEDED FOR SLEEP 90 tablet 0     levothyroxine (SYNTHROID/LEVOTHROID) 75 MCG tablet TAKE 1 TABLET BY MOUTH DAILY 90 tablet 0     gabapentin (NEURONTIN) 300 MG capsule Take 1 capsule (300 mg) by mouth 2 times daily 180 capsule 1     omeprazole (PRILOSEC) 20 MG CR capsule TAKE 1 CAPSULE BY MOUTH DAILY 90 capsule 1     MELATONIN PO Take 5 mg by mouth nightly as needed        atenolol (TENORMIN) 50 MG tablet TAKE 1 TABLET BY MOUTH DAILY 90 tablet 2     sennosides (SENOKOT) 8.6 MG tablet Take 2 tablets by mouth At Bedtime Reported on 5/17/2017       Ascorbic Acid (VITAMIN C PO) Take 500 mg by mouth daily       VENTOLIN  (90 BASE) MCG/ACT inhaler USE 2 PUFFS EVERY 6 HOURS AS NEEDED FOR SHORTNESS OF BREATH/DYSPNEA 18 each 1     [DISCONTINUED] tamoxifen (NOLVADEX) 20 MG tablet Take 1 tablet (20 mg) by mouth daily 90 tablet 0       No Known Allergies    Family History   Problem Relation Age of Onset     Cancer - colorectal Mother      Eye Disorder Mother      cataracts     Musculoskeletal Disorder Mother      CTS     CANCER Father      lung cancer     Genitourinary Problems Father      prostate problems     C.A.D. Brother      CABG     Eye Disorder Brother      cataracts     Breast Cancer Other      Ovarian Cancer Other      Pancreatic Cancer Other        Social History     Social History     Marital status:      Spouse name: Marisa     Number of children: N/A     Years of education: N/A     Occupational History     Not on file.     Social History Main Topics     Smoking status: Former Smoker     Types: Cigarettes     Smokeless tobacco: Never Used      Comment: tried to quit-yes     Alcohol use No       Comment: former     Drug use: No     Sexual activity: Not on file     Other Topics Concern     Caffeine Concern Yes     coffee, 2 cups daily     Parent/Sibling W/ Cabg, Mi Or Angioplasty Before 65f 55m? No     Social History Narrative       5 point ROS negative except as noted above in HPI, including Gen., Resp., CV, GI &  system review.     OBJECTIVE:  B/P: 136/74, Temperature: 99.4, Pulse: 76, Respirations: Data Unavailable    GENERAL APPEARANCE: Alert, no acute distress  CV: regular rate and rhythm, no murmur, rub or gallop  RESP: lungs clear to auscultation bilaterally  ABDOMEN: normal bowel sounds, soft, nontender, no hepatosplenomegaly or other masses  MSK:  Pain with right shoulder abduction.  Swelling in the arm diffusely.  Note I used a posterior approach.   SKIN: no suspicious lesions or rashes to visualized skin  NEURO: Alert, oriented x 3, speech and mentation normal    ASSESSMENT and PLAN:  (M25.511) Right shoulder pain, unspecified chronicity  (primary encounter diagnosis)  Comment: injected today.   Plan: Large Joint/Bursa injection and/or drainage         (Shoulder, Knee), Kenalog  80 MG                 [], Lidocaine 1% or 2%     [],         lidocaine 1 % injection, triamcinolone         acetonide (KENALOG) 40 MG/ML injection        F/u routine.  I think we may need to do this from time to time.  He is well aware of the increased risk of infection with the lymphedema from his breast cancer and surgery.  I did the injection under sterile technique.  He will report any concerns.     (R21) Rash  Comment: history of.   Plan: triamcinolone (KENALOG) 0.5 % cream        As above.  Refill the cream.     (E03.8) Other specified hypothyroidism  Comment: stable.   Plan: TSH with free T4 reflex        Update labs and follow.     (I10) HTN, goal below 140/80  Comment: stable.   Plan: Lipid Profile (Chol, Trig, HDL, LDL calc),         Comprehensive metabolic panel (BMP + Alb, Alk         Phos, ALT,  AST, Total. Bili, TP)        Update labs and follow.         PROCEDURE:  JOINT ASPIRATION/INJECTION.         After a discussion of risks, benefits and side effects of procedure, informed patient consent was obtained.       The right subacromial bursa was prepped and draped in the usual clean fashion (sterile not required for this procedure).      ASPIRATION: Not performed.                               INJECTION:  Using 4 cc of 1% lidocaine mixed                           with 80 mg of triamcinolone, the right subacromial bursa was successfully injected                          without complication.  Patient did experience some pain                          relief following injection.

## 2018-02-01 NOTE — NURSING NOTE
"Chief Complaint   Patient presents with     Shoulder Pain     Pt is requesting a steroid injection right shoulder, pain x 3 weeks       Initial /74  Pulse 76  Temp 99.4  F (37.4  C)  Ht 5' 7\" (1.702 m)  Wt 175 lb (79.4 kg)  SpO2 97%  BMI 27.41 kg/m2 Estimated body mass index is 27.41 kg/(m^2) as calculated from the following:    Height as of this encounter: 5' 7\" (1.702 m).    Weight as of this encounter: 175 lb (79.4 kg).  Medication Reconciliation: complete   Tonia Noriega    "

## 2018-02-01 NOTE — MR AVS SNAPSHOT
After Visit Summary   2/1/2018    Lio Daly    MRN: [id number]           Patient Information     Date Of Birth          5/28/1934        Visit Information        Provider Department      2/1/2018 3:00 PM Rubén Wren MD [facility]        Today's Diagnoses     Right shoulder pain, unspecified chronicity    -  1    Rash        Other specified hypothyroidism        HTN, goal below 140/80          Care Instructions    F/u with ongoing concerns.           Follow-ups after your visit        Your next 10 appointments already scheduled     Apr 13, 2018  2:00 PM CDT   LAB with NA LAB   [facility] ([facility] )    [address]  [facility] [address]   [phone]            Apr 20, 2018  2:00 PM CDT   (Arrive by 1:45 PM)   Return Visit with Radha Sales NP   [facility] ([facility] )    [address]  [facility] [address]   [phone]              Future tests that were ordered for you today     Open Future Orders        Priority Expected Expires Ordered    Lipid Profile (Chol, Trig, HDL, LDL calc) Routine  2/1/2019 2/1/2018    Comprehensive metabolic panel (BMP + Alb, Alk Phos, ALT, AST, Total. Bili, TP) Routine  2/1/2019 2/1/2018    TSH with free T4 reflex Routine  2/1/2019 2/1/2018            Who to contact     If you have questions or need follow up information about today's clinic visit or your schedule please contact [facility] directly at [phone].  Normal or non-critical lab and imaging results will be communicated to you by MyChart, letter or phone within 4 business days after the clinic has received the results. If you do not hear from us within 7 days, please contact the clinic through MyChart or phone. If you have a critical or abnormal lab result, we will notify you by phone as soon as possible.  Submit refill requests through [employer] or call your pharmacy and they  "will forward the refill request to us. Please allow 3 business days for your refill to be completed.          Additional Information About Your Visit        [employer] Information     [facility] lets you send messages to your doctor, view your test results, renew your prescriptions, schedule appointments and more. To sign up, go to [email or web address] . Click on \"Log in\" on the left side of the screen, which will take you to the Welcome page. Then click on \"Sign up Now\" on the right side of the page.     You will be asked to enter the access code listed below, as well as some personal information. Please follow the directions to create your username and password.     Your access code is: [email or web address]  Expires: 3/15/2018  2:46 PM     Your access code will  in 90 days. If you need help or a new code, please call your [place] clinic or [phone].        Care EveryWhere ID     This is your Care EveryWhere ID. This could be used by other organizations to access your [place] medical records  [id number]        Your Vitals Were     Pulse Temperature Height Pulse Oximetry BMI (Body Mass Index)       76 99.4  F (37.4  C) 5' 7\" (1.702 m) 97% 27.41 kg/m2        Blood Pressure from Last 3 Encounters:   18 136/74   12/15/17 140/73   17 114/53    Weight from Last 3 Encounters:   18 175 lb (79.4 kg)   12/15/17 172 lb 12.8 oz (78.4 kg)   17 162 lb (73.5 kg)              We Performed the Following     Kenalog  80 MG         []     Large Joint/Bursa injection and/or drainage (Shoulder, Knee)     Lidocaine 1% or 2%     []          Today's Medication Changes          These changes are accurate as of 18  3:43 PM.  If you have any questions, ask your nurse or doctor.               Start taking these medicines.        Dose/Directions    lidocaine 1 % injection   Used for:  Right shoulder pain, unspecified chronicity   Started by:  Rubén Wren MD        Dose:  5 mL   5 mLs by " INTRA-ARTICULAR route once for 1 dose   Quantity:  5 mL   Refills:  0       triamcinolone acetonide 40 MG/ML injection   Commonly known as:  KENALOG   Used for:  Right shoulder pain, unspecified chronicity   Started by:  Rubén Wren MD        Dose:  80 mg   2 mLs (80 mg) by INTRA-ARTICULAR route once for 1 dose   Quantity:  2 mL   Refills:  0         These medicines have changed or have updated prescriptions.        Dose/Directions    order for DME   This may have changed:  Another medication with the same name was removed. Continue taking this medication, and follow the directions you see here.   Used for:  Lymphedema   Changed by:  Rubén Wren MD        Equipment being ordered: right hand compression glove   Quantity:  1 each   Refills:  0       tamoxifen 20 MG tablet   Commonly known as:  NOLVADEX   This may have changed:  Another medication with the same name was removed. Continue taking this medication, and follow the directions you see here.   Used for:  Primary breast malignancy, right (H)   Changed by:  Rubén Wren MD        TAKE 1 TABLET BY MOUTH DAILY   Quantity:  90 tablet   Refills:  1         Stop taking these medicines if you haven't already. Please contact your care team if you have questions.     HYDROcodone-acetaminophen 5-325 MG per tablet   Commonly known as:  NORCO   Stopped by:  Rubén Wren MD           psyllium 58.6 % Powd   Commonly known as:  METAMUCIL   Stopped by:  Rubén Wren MD           traMADol 50 MG tablet   Commonly known as:  ULTRAM   Stopped by:  Rubén Wren MD                Where to get your medicines      These medications were sent to [facility] - [address]  [address] [address]     Phone:  [phone]     triamcinolone 0.5 % cream         Some of these will need a paper prescription and others can be bought over the counter.  Ask your nurse if you have questions.     You don't need a  prescription for these medications     lidocaine 1 % injection    triamcinolone acetonide 40 MG/ML injection                Primary Care Provider Office Phone # Fax #    Rubén Wren [phone]       [facility] [address] [facility] [address]  [facility] [place] [address]        Equal Access to Services     DEEP ROBERTSON : Hadii aad ku hadasho Soomaali, waaxda luqadaha, qaybta kaalmada adeegyada, waxay idiin hayaan adereynaldo khplacidoserina lawilman hull. So [facility] [phone].    ATENCIÓN: Si habla español, tiene a dc disposición servicios gratuitos de asistencia lingüística. Jamin al [phone].    We comply with applicable federal civil rights laws and [place] laws. We do not discriminate on the basis of race, color, national origin, age, disability, sex, sexual orientation, or gender identity.            Thank you!     Thank you for choosing [facility]  for your care. Our goal is always to provide you with excellent care. Hearing back from our patients is one way we can continue to improve our services. Please take a few minutes to complete the written survey that you may receive in the mail after your visit with us. Thank you!             Your Updated Medication List - Protect others around you: Learn how to safely use, store and throw away your medicines at www.disposemymeds.org.          This list is accurate as of 2/1/18  3:43 PM.  Always use your most recent med list.                   Brand Name Dispense Instructions for use Diagnosis    ALPRAZolam 0.5 MG tablet    XANAX    180 tablet    TAKE 1 OR 2 TABLETS BY MOUTH AT BEDTIME AS NEEDED    Anxiety       atenolol 50 MG tablet    TENORMIN    90 tablet    TAKE 1 TABLET BY MOUTH DAILY    Benign essential hypertension       gabapentin 300 MG capsule    NEURONTIN    180 capsule    Take 1 capsule (300 mg) by mouth 2 times daily    Lumbar radiculopathy       levothyroxine 75 MCG tablet    SYNTHROID/LEVOTHROID    90 tablet    TAKE 1 TABLET BY MOUTH DAILY     Hypothyroidism       lidocaine 1 % injection     5 mL    5 mLs by INTRA-ARTICULAR route once for 1 dose    Right shoulder pain, unspecified chronicity       MELATONIN PO      Take 5 mg by mouth nightly as needed        omeprazole 20 MG CR capsule    priLOSEC    90 capsule    TAKE 1 CAPSULE BY MOUTH DAILY    Gastroesophageal reflux disease, esophagitis presence not specified       order for DME     1 each    Equipment being ordered: right hand compression glove    Lymphedema       pravastatin 40 MG tablet    PRAVACHOL    135 tablet    TAKE 1 TABLET BY MOUTH EVERY MORNING AND 1 EVERY OTHER EVENING    Hyperlipidemia LDL goal <130       sennosides 8.6 MG tablet    SENOKOT     Take 2 tablets by mouth At Bedtime Reported on 5/17/2017        tamoxifen 20 MG tablet    NOLVADEX    90 tablet    TAKE 1 TABLET BY MOUTH DAILY    Primary breast malignancy, right (H)       tamsulosin 0.4 MG capsule    FLOMAX    180 capsule    One capsule in morning and one at night    Benign prostatic hyperplasia, unspecified whether lower urinary tract symptoms present       triamcinolone 0.5 % cream    KENALOG    30 g    Apply sparingly to affected area three times daily.    Rash       triamcinolone acetonide 40 MG/ML injection    KENALOG    2 mL    2 mLs (80 mg) by INTRA-ARTICULAR route once for 1 dose    Right shoulder pain, unspecified chronicity       VENTOLIN  (90 BASE) MCG/ACT Inhaler   Generic drug:  albuterol     18 each    USE 2 PUFFS EVERY 6 HOURS AS NEEDED FOR SHORTNESS OF BREATH/DYSPNEA    SOB (shortness of breath)       VITAMIN C PO      Take 500 mg by mouth daily        zolpidem 5 MG tablet    AMBIEN    90 tablet    TAKE 1 TABLET BY MOUTH NIGHTLY AS NEEDED FOR SLEEP    Persistent insomnia

## 2018-02-02 ASSESSMENT — PATIENT HEALTH QUESTIONNAIRE - PHQ9: SUM OF ALL RESPONSES TO PHQ QUESTIONS 1-9: 1

## 2018-02-02 ASSESSMENT — ANXIETY QUESTIONNAIRES: GAD7 TOTAL SCORE: 2

## 2018-02-06 DIAGNOSIS — M54.16 LUMBAR RADICULOPATHY: ICD-10-CM

## 2018-02-06 RX ORDER — GABAPENTIN 300 MG/1
CAPSULE ORAL
Qty: 180 CAPSULE | Refills: 0 | Status: SHIPPED | OUTPATIENT
Start: 2018-02-06 | End: 2018-08-03

## 2018-02-06 NOTE — TELEPHONE ENCOUNTER
Neurontin last filled on 10.30.17 #180 with 1 refill. Last office visit on 2.1.18.Medication pended.Thank you

## 2018-02-09 DIAGNOSIS — E03.8 OTHER SPECIFIED HYPOTHYROIDISM: ICD-10-CM

## 2018-02-09 DIAGNOSIS — I10 HTN, GOAL BELOW 140/80: ICD-10-CM

## 2018-02-09 LAB
ALBUMIN SERPL-MCNC: 3.3 G/DL (ref 3.4–5)
ALP SERPL-CCNC: 69 U/L (ref 40–150)
ALT SERPL W P-5'-P-CCNC: 19 U/L (ref 0–70)
ANION GAP SERPL CALCULATED.3IONS-SCNC: 9 MMOL/L (ref 3–14)
AST SERPL W P-5'-P-CCNC: 17 U/L (ref 0–45)
BILIRUB SERPL-MCNC: 0.5 MG/DL (ref 0.2–1.3)
BUN SERPL-MCNC: 25 MG/DL (ref 7–30)
CALCIUM SERPL-MCNC: 8.7 MG/DL (ref 8.5–10.1)
CHLORIDE SERPL-SCNC: 107 MMOL/L (ref 94–109)
CHOLEST SERPL-MCNC: 142 MG/DL
CO2 SERPL-SCNC: 26 MMOL/L (ref 20–32)
CREAT SERPL-MCNC: 1.13 MG/DL (ref 0.66–1.25)
GFR SERPL CREATININE-BSD FRML MDRD: 62 ML/MIN/1.7M2
GLUCOSE SERPL-MCNC: 100 MG/DL (ref 70–99)
HDLC SERPL-MCNC: 62 MG/DL
LDLC SERPL CALC-MCNC: 62 MG/DL
NONHDLC SERPL-MCNC: 80 MG/DL
POTASSIUM SERPL-SCNC: 4.2 MMOL/L (ref 3.4–5.3)
PROT SERPL-MCNC: 6.8 G/DL (ref 6.8–8.8)
SODIUM SERPL-SCNC: 142 MMOL/L (ref 133–144)
TRIGL SERPL-MCNC: 90 MG/DL
TSH SERPL DL<=0.005 MIU/L-ACNC: 2.69 MU/L (ref 0.4–4)

## 2018-02-09 PROCEDURE — 80053 COMPREHEN METABOLIC PANEL: CPT | Mod: ZL | Performed by: FAMILY MEDICINE

## 2018-02-09 PROCEDURE — 84443 ASSAY THYROID STIM HORMONE: CPT | Mod: ZL | Performed by: FAMILY MEDICINE

## 2018-02-09 PROCEDURE — 36415 COLL VENOUS BLD VENIPUNCTURE: CPT | Mod: ZL | Performed by: FAMILY MEDICINE

## 2018-02-09 PROCEDURE — 80061 LIPID PANEL: CPT | Mod: ZL | Performed by: FAMILY MEDICINE

## 2018-03-08 DIAGNOSIS — F41.9 ANXIETY: ICD-10-CM

## 2018-03-08 RX ORDER — ALPRAZOLAM 0.5 MG
TABLET ORAL
Qty: 180 TABLET | Refills: 0 | Status: SHIPPED | OUTPATIENT
Start: 2018-03-08 | End: 2018-06-05

## 2018-03-09 DIAGNOSIS — G47.00 PERSISTENT INSOMNIA: ICD-10-CM

## 2018-03-09 RX ORDER — ZOLPIDEM TARTRATE 5 MG/1
TABLET ORAL
Qty: 90 TABLET | Refills: 0 | Status: SHIPPED | OUTPATIENT
Start: 2018-03-09 | End: 2018-06-05

## 2018-04-11 DIAGNOSIS — K21.9 GASTROESOPHAGEAL REFLUX DISEASE, ESOPHAGITIS PRESENCE NOT SPECIFIED: ICD-10-CM

## 2018-04-11 NOTE — TELEPHONE ENCOUNTER
omeprazole (PRILOSEC) 20 MG CR capsule    Last Written Prescription Date:  10/24/17  Last Fill Quantity: 90,   # refills: 1  Last Office Visit: 2/1/18  Future Office visit:    Next 5 appointments (look out 90 days)     Apr 20, 2018  2:00 PM CDT   (Arrive by 1:45 PM)   Return Visit with Radha Sales NP   [facility] ([facility] )    [address] [place] [address]   [phone]

## 2018-04-13 DIAGNOSIS — C50.921 MALIGNANT NEOPLASM OF RIGHT MALE BREAST, UNSPECIFIED ESTROGEN RECEPTOR STATUS, UNSPECIFIED SITE OF BREAST (H): ICD-10-CM

## 2018-04-13 DIAGNOSIS — I89.0 LYMPHEDEMA: ICD-10-CM

## 2018-04-13 LAB
ALBUMIN SERPL-MCNC: 3.5 G/DL (ref 3.4–5)
ALP SERPL-CCNC: 74 U/L (ref 40–150)
ALT SERPL W P-5'-P-CCNC: 20 U/L (ref 0–70)
ANION GAP SERPL CALCULATED.3IONS-SCNC: 8 MMOL/L (ref 3–14)
AST SERPL W P-5'-P-CCNC: 21 U/L (ref 0–45)
BASOPHILS # BLD AUTO: 0 10E9/L (ref 0–0.2)
BASOPHILS NFR BLD AUTO: 0.6 %
BILIRUB SERPL-MCNC: 0.3 MG/DL (ref 0.2–1.3)
BUN SERPL-MCNC: 21 MG/DL (ref 7–30)
CALCIUM SERPL-MCNC: 8.8 MG/DL (ref 8.5–10.1)
CHLORIDE SERPL-SCNC: 107 MMOL/L (ref 94–109)
CO2 SERPL-SCNC: 25 MMOL/L (ref 20–32)
CREAT SERPL-MCNC: 1.08 MG/DL (ref 0.66–1.25)
DIFFERENTIAL METHOD BLD: ABNORMAL
EOSINOPHIL # BLD AUTO: 0.4 10E9/L (ref 0–0.7)
EOSINOPHIL NFR BLD AUTO: 5.3 %
ERYTHROCYTE [DISTWIDTH] IN BLOOD BY AUTOMATED COUNT: 15.8 % (ref 10–15)
GFR SERPL CREATININE-BSD FRML MDRD: 65 ML/MIN/1.7M2
GLUCOSE SERPL-MCNC: 116 MG/DL (ref 70–99)
HCT VFR BLD AUTO: 34.2 % (ref 40–53)
HGB BLD-MCNC: 11.1 G/DL (ref 13.3–17.7)
LYMPHOCYTES # BLD AUTO: 1.9 10E9/L (ref 0.8–5.3)
LYMPHOCYTES NFR BLD AUTO: 26.9 %
MCH RBC QN AUTO: 29.7 PG (ref 26.5–33)
MCHC RBC AUTO-ENTMCNC: 32.5 G/DL (ref 31.5–36.5)
MCV RBC AUTO: 91 FL (ref 78–100)
MONOCYTES # BLD AUTO: 0.7 10E9/L (ref 0–1.3)
MONOCYTES NFR BLD AUTO: 10.1 %
NEUTROPHILS # BLD AUTO: 4.1 10E9/L (ref 1.6–8.3)
NEUTROPHILS NFR BLD AUTO: 57.1 %
PLATELET # BLD AUTO: 253 10E9/L (ref 150–450)
POTASSIUM SERPL-SCNC: 4.6 MMOL/L (ref 3.4–5.3)
PROT SERPL-MCNC: 7.1 G/DL (ref 6.8–8.8)
RBC # BLD AUTO: 3.74 10E12/L (ref 4.4–5.9)
SODIUM SERPL-SCNC: 140 MMOL/L (ref 133–144)
WBC # BLD AUTO: 7.1 10E9/L (ref 4–11)

## 2018-04-13 PROCEDURE — 80053 COMPREHEN METABOLIC PANEL: CPT | Mod: ZL | Performed by: NURSE PRACTITIONER

## 2018-04-13 PROCEDURE — 85025 COMPLETE CBC W/AUTO DIFF WBC: CPT | Mod: ZL | Performed by: NURSE PRACTITIONER

## 2018-04-13 PROCEDURE — 36415 COLL VENOUS BLD VENIPUNCTURE: CPT | Mod: ZL | Performed by: NURSE PRACTITIONER

## 2018-04-13 PROCEDURE — 99000 SPECIMEN HANDLING OFFICE-LAB: CPT | Performed by: NURSE PRACTITIONER

## 2018-04-13 PROCEDURE — 86300 IMMUNOASSAY TUMOR CA 15-3: CPT | Mod: ZL | Performed by: NURSE PRACTITIONER

## 2018-04-16 LAB — CANCER AG27-29 SERPL-ACNC: 10 U/ML

## 2018-04-20 ENCOUNTER — ONCOLOGY VISIT (OUTPATIENT)
Dept: ONCOLOGY | Facility: OTHER | Age: 83
End: 2018-04-20
Attending: NURSE PRACTITIONER
Payer: COMMERCIAL

## 2018-04-20 VITALS
OXYGEN SATURATION: 96 % | DIASTOLIC BLOOD PRESSURE: 80 MMHG | HEIGHT: 67 IN | HEART RATE: 84 BPM | RESPIRATION RATE: 18 BRPM | WEIGHT: 171.6 LBS | BODY MASS INDEX: 26.93 KG/M2 | SYSTOLIC BLOOD PRESSURE: 128 MMHG | TEMPERATURE: 98.2 F

## 2018-04-20 DIAGNOSIS — B07.9 WART OF FACE: ICD-10-CM

## 2018-04-20 DIAGNOSIS — C50.921 MALIGNANT NEOPLASM OF RIGHT MALE BREAST, UNSPECIFIED ESTROGEN RECEPTOR STATUS, UNSPECIFIED SITE OF BREAST (H): Primary | ICD-10-CM

## 2018-04-20 DIAGNOSIS — D64.9 ANEMIA, UNSPECIFIED TYPE: ICD-10-CM

## 2018-04-20 PROCEDURE — 99213 OFFICE O/P EST LOW 20 MIN: CPT | Performed by: NURSE PRACTITIONER

## 2018-04-20 PROCEDURE — G0463 HOSPITAL OUTPT CLINIC VISIT: HCPCS

## 2018-04-20 ASSESSMENT — PAIN SCALES - GENERAL: PAINLEVEL: MODERATE PAIN (4)

## 2018-04-20 NOTE — PROGRESS NOTES
Oncology Follow-up Visit:  April 19, 2018    Reason for Visit:  Patient presents with:  RECHECK: Follow up malignant neoplasm of right male breast     Nursing Note and documentation reviewed: yes    HPI:  This is an 83-year-old male patient who presents to the oncology/hematology clinic today in follow up of right breast cancer.  Patient underwent a right mastectomy with axillary node dissection in July 2015. He was placed on 20 mg of tamoxifen and remains on this.  He presents today stating he is feeling excellent. He continues with lymphedema to the right upper extremity and states this is essentially unchanged.  He continues to wear a sleeve toward the end of the day.  He does question in regards to a growth on the lower lid of his right eye.  States he is doing well from an anxiety and depression standpoint but is very lonely at times as he does miss his wife.    Oncologic History: Patient had presented with complaints of right nipple irritation over a six-month period and was noted to have a mass in the right breast. Mammogram was completed followed by biopsy and pathology showed an invasive carcinoma. On 7/27/2015, he underwent a right modified radical mastectomy with axillary node dissection by Dr. Garsia. 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 DCIS was present, grade 2 with expansive comedonecrosis. Node dissection revealed 1/17 nodes revealing micrometastasis. Tumor was ER/MO positive, HER-2/thomas negative. Patient was staged pathologic T1c N1mi M0.      At consultation, patient was not interested in adjuvant chemotherapy and was placed on tamoxifen 20 mg daily.      BRCA 2+      Current Chemo Regime/TX: Tamoxifen 20mg daily  Current Cycle: n/a  # of completed cycles: n/a      Previous treatment:  n/a    Past Medical History:   Diagnosis Date     Dysphagia 06/28/2004     HTN (hypertension)       Need for prophylactic vaccination and inoculation 12/10/2001     Need for prophylactic vaccination and inoculation, 11/06/2003     Other and unspecified hyperlipidemia 05/01/2001     Prediabetes      Shortness of breath 03/28/2006     Shoulder pain 06/15/2012     Unspecified essential hypertension 05/18/2000       Past Surgical History:   Procedure Laterality Date     CARDIAC SURGERY      angiogram 1992 [facility]     COLONOSCOPY      1998     GENITOURINARY SURGERY      prostate bx [place]     GENITOURINARY SURGERY      Prostate bx [facility]     GI SURGERY      EGD     GI SURGERY      Pt has EGD with removal of food from esophagus     MASTECTOMY SIMPLE, SENTINEL NODE, COMBINED Right 7/27/2015    Procedure: COMBINED MASTECTOMY SIMPLE, SENTINEL NODE;  Surgeon: Aliyah Garsia MD;  Location: [facility]     ORTHOPEDIC SURGERY      bilateral knee replacements       Family History   Problem Relation Age of Onset     Cancer - colorectal Mother      Eye Disorder Mother      cataracts     Musculoskeletal Disorder Mother      CTS     Colon Cancer Mother      Other Cancer Mother      CANCER Father      lung cancer     Genitourinary Problems Father      prostate problems     Other Cancer Father      lung     C.A.D. Brother      CABG     Eye Disorder Brother      cataracts     Breast Cancer Other      Ovarian Cancer Other      Pancreatic Cancer Other      DIABETES No family hx of      Coronary Artery Disease No family hx of      CEREBROVASCULAR DISEASE No family hx of      Hyperlipidemia No family hx of      Hypertension No family hx of      Prostate Cancer No family hx of      Thyroid Disease No family hx of      Genetic Disorder No family hx of      Asthma No family hx of      Anesthesia Reaction No family hx of        Social History     Social History     Marital status:      Spouse name: Marisa     Number of children: N/A     Years of education: N/A     Occupational History     Not on file.     Social History Main  Topics     Smoking status: Former Smoker     Types: Cigarettes     Smokeless tobacco: Never Used      Comment: tried to quit-yes     Alcohol use No      Comment: former     Drug use: No     Sexual activity: Not on file     Other Topics Concern     Caffeine Concern Yes     coffee, 2 cups daily     Parent/Sibling W/ Cabg, Mi Or Angioplasty Before 65f 55m? No     Social History Narrative       Current Outpatient Prescriptions   Medication     ALPRAZolam (XANAX) 0.5 MG tablet     Ascorbic Acid (VITAMIN C PO)     atenolol (TENORMIN) 50 MG tablet     gabapentin (NEURONTIN) 300 MG capsule     levothyroxine (SYNTHROID/LEVOTHROID) 75 MCG tablet     MELATONIN PO     omeprazole (PRILOSEC) 20 MG CR capsule     order for DME     pravastatin (PRAVACHOL) 40 MG tablet     sennosides (SENOKOT) 8.6 MG tablet     tamoxifen (NOLVADEX) 20 MG tablet     tamsulosin (FLOMAX) 0.4 MG capsule     triamcinolone (KENALOG) 0.5 % cream     zolpidem (AMBIEN) 5 MG tablet     VENTOLIN  (90 BASE) MCG/ACT inhaler     No current facility-administered medications for this visit.         No Known Allergies    Review Of Systems:  Constitutional: denies fever, weight changes, chills, and night sweats.  Eyes: denies blurred or double vision  Ears/Nose/Throat: denies ear pain, nose problems, difficulty swallowing  Respiratory: denies shortness of breath, cough   Skin: the history of present illness  Breast/Chest wall: denies pain, lumps or discharge  Cardiovascular: denies chest pain, palpitations, edema  Gastrointestinal: denies abdominal pain, bloating, nausea, vomiting, early satiety  Genitourinary: denies difficulty with urination, blood in urine  Musculoskeletal:denies new muscle pain, bone pain  Neurologic: denies lightheadedness, headaches, numbness or tingling  Psychiatric: denies anxiety, depression=-see history of present illness  Hematologic/Lymphatic/Immunologic: denies easy bruising, easy bleeding, lumps or bumps noted  Endocrine: Denies  "increased thirst      Physical Exam:  /80  Pulse 84  Temp 98.2  F (36.8  C) (Tympanic)  Resp 18  Ht 1.702 m (5' 7\")  Wt 77.8 kg (171 lb 9.6 oz)  SpO2 96%  BMI 26.88 kg/m2    GENERAL APPEARANCE: Healthy, alert and in no acute distress.  HEENT: Normocephalic, Sclerae anicteric. Oropharynx without ulcers, lesions, or thrush.  NECK:  No asymmetry or masses, no thyromegaly.  LYMPHATICS: No palpable cervical, supraclavicular, axillary, or inguinal nodes   RESP: Lungs clear to auscultation bilaterally, respirations regular and easy  CARDIOVASCULAR: Regular rate and rhythm. Normal S1, S2; no murmur, gallop, or rub.  ABDOMEN: Soft, nontender. Bowel sounds auscultated all 4 quadrants. No palpable organomegaly or masses.  BREAST/Chest wall: no lumps, masses or tenderness bilaterally  MUSCULOSKELETAL: Extremities without gross deformities noted.   SKIN: wart noted to the right lower eyelid  NEURO: Alert and oriented x 3.  Gait steady.  PSYCHIATRIC: Mentation and affect appear normal.  Mood appropriate.    Laboratory:  Results for orders placed or performed in visit on 04/13/18   CBC with platelets differential   Result Value Ref Range    WBC 7.1 4.0 - 11.0 10e9/L    RBC Count 3.74 (L) 4.4 - 5.9 10e12/L    Hemoglobin 11.1 (L) 13.3 - 17.7 g/dL    Hematocrit 34.2 (L) 40.0 - 53.0 %    MCV 91 78 - 100 fl    MCH 29.7 26.5 - 33.0 pg    MCHC 32.5 31.5 - 36.5 g/dL    RDW 15.8 (H) 10.0 - 15.0 %    Platelet Count 253 150 - 450 10e9/L    Diff Method Automated Method     % Neutrophils 57.1 %    % Lymphocytes 26.9 %    % Monocytes 10.1 %    % Eosinophils 5.3 %    % Basophils 0.6 %    Absolute Neutrophil 4.1 1.6 - 8.3 10e9/L    Absolute Lymphocytes 1.9 0.8 - 5.3 10e9/L    Absolute Monocytes 0.7 0.0 - 1.3 10e9/L    Absolute Eosinophils 0.4 0.0 - 0.7 10e9/L    Absolute Basophils 0.0 0.0 - 0.2 10e9/L   Comprehensive metabolic panel   Result Value Ref Range    Sodium 140 133 - 144 mmol/L    Potassium 4.6 3.4 - 5.3 mmol/L    Chloride " 107 94 - 109 mmol/L    Carbon Dioxide 25 20 - 32 mmol/L    Anion Gap 8 3 - 14 mmol/L    Glucose 116 (H) 70 - 99 mg/dL    Urea Nitrogen 21 7 - 30 mg/dL    Creatinine 1.08 0.66 - 1.25 mg/dL    GFR Estimate 65 >60 mL/min/1.7m2    GFR Estimate If Black 79 >60 mL/min/1.7m2    Calcium 8.8 8.5 - 10.1 mg/dL    Bilirubin Total 0.3 0.2 - 1.3 mg/dL    Albumin 3.5 3.4 - 5.0 g/dL    Protein Total 7.1 6.8 - 8.8 g/dL    Alkaline Phosphatase 74 40 - 150 U/L    ALT 20 0 - 70 U/L    AST 21 0 - 45 U/L   Ca27.29  breast tumor marker   Result Value Ref Range    CA 27-29 10 U/mL       Imaging Studies:  None for today      ASSESSMENT/PLAN:    #1  Breast cancer:  Diagnosed with Stage 1, pT1c N1mi M0 carcinoma of the right breast in July 2015.  He underwent mastectomy with node dissection with 1/17 nodes revealing micrometastases; tumor was 2cm, ER/WI positive, HER-2/thomas negative.  He'll continue with tamoxifen 20 mg daily and follow up in 4 months with a CBC, CMP and CA 27.29.     #2  Anemia:  Some fluctuation over the past 3 years.  Will obtain an Iron/TIBC and ferritin when he returns in 4 months.    #3  Wart:  Recommended f/u with his PCP for removal.    I encouraged patient to call with any questions or concerns.      Radha GONSALVES, FNP-BC, AOCNP

## 2018-04-20 NOTE — MR AVS SNAPSHOT
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 Please come 1 week prior for lab work.  When you are in need of a refill, please call your pharmacy and they will send us a request. If you have any questions please call [phone]  Other instructions:  none          Follow-ups after your visit        Your next 10 appointments already scheduled     Aug 10, 2018 10:00 AM CDT   LAB with NA LAB   [facility] [place] ([facility] )    [address] [place] [address]   [phone]            Aug 17, 2018  1:30 PM CDT   (Arrive by 1:15 PM)   Return Visit with Radha Sales NP   [facility] [place] ([facility] )    [address]  [place] [address]   [phone]              Future tests that were ordered for you today     Open Future Orders        Priority Expected Expires Ordered    CBC with platelets differential Routine 8/20/2018 9/20/2018 4/20/2018    Comprehensive metabolic panel Routine 8/20/2018 9/20/2018 4/20/2018    Ca27.29  breast tumor marker Routine 8/20/2018 9/20/2018 4/20/2018            Who to contact     If you have questions or need follow up information about today's clinic visit or your schedule please contact [facility] directly at [phone].  Normal or non-critical lab and imaging results will be communicated to you by MyChart, letter or phone within 4 business days after the clinic has received the results. If you do not hear from us within 7 days, please contact the  "clinic through [employer] or phone. If you have a critical or abnormal lab result, we will notify you by phone as soon as possible.  Submit refill requests through [employer] or call your pharmacy and they will forward the refill request to us. Please allow 3 business days for your refill to be completed.          Additional Information About Your Visit        [employer] Information     [employer] lets you send messages to your doctor, view your test results, renew your prescriptions, schedule appointments and more. To sign up, go to [email or web address] . Click on \"Log in\" on the left side of the screen, which will take you to the Welcome page. Then click on \"Sign up Now\" on the right side of the page.     You will be asked to enter the access code listed below, as well as some personal information. Please follow the directions to create your username and password.     Your access code is: [email or web address]  Expires: 2018  2:49 PM     Your access code will  in 90 days. If you need help or a new code, please call your [facility] clinic or [phone].        Care EveryWhere ID     This is your Care EveryWhere ID. This could be used by other organizations to access your [facility] medical records  [id number]        Your Vitals Were     Pulse Temperature Respirations Height Pulse Oximetry BMI (Body Mass Index)    84 98.2  F (36.8  C) (Tympanic) 18 1.702 m (5' 7\") 96% 26.88 kg/m2       Blood Pressure from Last 3 Encounters:   18 128/80   18 136/74   12/15/17 140/73    Weight from Last 3 Encounters:   18 77.8 kg (171 lb 9.6 oz)   18 79.4 kg (175 lb)   12/15/17 78.4 kg (172 lb 12.8 oz)               Primary Care Provider Office Phone # Fax #    Rubén Wren [phone]       [address] [place] [address]        Equal Access to Services     JESSICA ROBERTSON AH: Hadii luis manuel Higginbotham, aisha luclaudia, qaybta kaeileen smith. " So [facility] [phone].    ATENCIÓN: Si cedric reyna, tiene a dc disposición servicios gratuitos de asistencia lingüística. Jamin hay [phone].    We comply with applicable federal civil rights laws and [place] laws. We do not discriminate on the basis of race, color, national origin, age, disability, sex, sexual orientation, or gender identity.            Thank you!     Thank you for choosing [facility]  for your care. Our goal is always to provide you with excellent care. Hearing back from our patients is one way we can continue to improve our services. Please take a few minutes to complete the written survey that you may receive in the mail after your visit with us. Thank you!             Your Updated Medication List - Protect others around you: Learn how to safely use, store and throw away your medicines at www.disposemymeds.org.          This list is accurate as of 4/20/18  2:54 PM.  Always use your most recent med list.                   Brand Name Dispense Instructions for use Diagnosis    ALPRAZolam 0.5 MG tablet    XANAX    180 tablet    TAKE 1 OR 2 TABLETS BY MOUTH AT BEDTIME AS NEEDED    Anxiety       atenolol 50 MG tablet    TENORMIN    90 tablet    TAKE 1 TABLET BY MOUTH DAILY    Benign essential hypertension       gabapentin 300 MG capsule    NEURONTIN    180 capsule    TAKE 1 CAPSULE BY MOUTH 2 TIMES A DAY    Lumbar radiculopathy       levothyroxine 75 MCG tablet    SYNTHROID/LEVOTHROID    90 tablet    TAKE 1 TABLET BY MOUTH DAILY    Hypothyroidism       MELATONIN PO      Take 5 mg by mouth nightly as needed        omeprazole 20 MG CR capsule    priLOSEC    90 capsule    TAKE 1 CAPSULE BY MOUTH DAILY    Gastroesophageal reflux disease, esophagitis presence not specified       order for DME     1 each    Equipment being ordered: right hand compression glove    Lymphedema       pravastatin 40 MG tablet    PRAVACHOL    135 tablet    TAKE 1 TABLET BY MOUTH EVERY MORNING AND 1 EVERY OTHER  EVENING    Hyperlipidemia LDL goal <130       sennosides 8.6 MG tablet    SENOKOT     Take 2 tablets by mouth At Bedtime Reported on 5/17/2017        tamoxifen 20 MG tablet    NOLVADEX    90 tablet    TAKE 1 TABLET BY MOUTH DAILY    Primary breast malignancy, right (H)       tamsulosin 0.4 MG capsule    FLOMAX    180 capsule    One capsule in morning and one at night    Benign prostatic hyperplasia, unspecified whether lower urinary tract symptoms present       triamcinolone 0.5 % cream    KENALOG    30 g    Apply sparingly to affected area three times daily.    Rash       VENTOLIN  (90 Base) MCG/ACT Inhaler   Generic drug:  albuterol     18 each    USE 2 PUFFS EVERY 6 HOURS AS NEEDED FOR SHORTNESS OF BREATH/DYSPNEA    SOB (shortness of breath)       VITAMIN C PO      Take 500 mg by mouth daily        zolpidem 5 MG tablet    AMBIEN    90 tablet    TAKE 1 TABLET BY MOUTH NIGHTLY AS NEEDED FOR SLEEP    Persistent insomnia

## 2018-04-20 NOTE — PATIENT INSTRUCTIONS
We would like to see you back in 4 months. Please come 1 week prior for lab work.  When you are in need of a refill, please call your pharmacy and they will send us a request. If you have any questions please call [phone]  Other instructions:  none

## 2018-04-20 NOTE — NURSING NOTE
"Chief Complaint   Patient presents with     RECHECK     Follow up malignant neoplasm of right male breast       Initial /80  Pulse 84  Temp 98.2  F (36.8  C) (Tympanic)  Resp 18  Ht 1.702 m (5' 7\")  Wt 77.8 kg (171 lb 9.6 oz)  SpO2 96%  BMI 26.88 kg/m2 Estimated body mass index is 26.88 kg/(m^2) as calculated from the following:    Height as of this encounter: 1.702 m (5' 7\").    Weight as of this encounter: 77.8 kg (171 lb 9.6 oz).  Medication Reconciliation: complete   Immunizations reviewed and up to date info given on health care directives, pain = 4/10 right shoulder, PHQ9 = 0.  Brianna Alexandra LPN      "

## 2018-04-21 ASSESSMENT — PATIENT HEALTH QUESTIONNAIRE - PHQ9: SUM OF ALL RESPONSES TO PHQ QUESTIONS 1-9: 0

## 2018-05-07 DIAGNOSIS — E78.5 HYPERLIPIDEMIA LDL GOAL <130: ICD-10-CM

## 2018-05-07 RX ORDER — PRAVASTATIN SODIUM 40 MG
TABLET ORAL
Qty: 135 TABLET | Refills: 1 | Status: SHIPPED | OUTPATIENT
Start: 2018-05-07 | End: 2018-11-19

## 2018-06-05 DIAGNOSIS — G47.00 PERSISTENT INSOMNIA: ICD-10-CM

## 2018-06-05 DIAGNOSIS — F41.9 ANXIETY: ICD-10-CM

## 2018-06-06 RX ORDER — ZOLPIDEM TARTRATE 5 MG/1
TABLET ORAL
Qty: 90 TABLET | Refills: 0 | Status: SHIPPED | OUTPATIENT
Start: 2018-06-06 | End: 2018-09-04

## 2018-06-07 RX ORDER — ALPRAZOLAM 0.5 MG
TABLET ORAL
Qty: 180 TABLET | Refills: 0 | Status: SHIPPED | OUTPATIENT
Start: 2018-06-07 | End: 2018-09-04

## 2018-07-09 DIAGNOSIS — N40.0 BENIGN PROSTATIC HYPERPLASIA, UNSPECIFIED WHETHER LOWER URINARY TRACT SYMPTOMS PRESENT: ICD-10-CM

## 2018-07-09 RX ORDER — TAMSULOSIN HYDROCHLORIDE 0.4 MG/1
CAPSULE ORAL
Qty: 180 CAPSULE | Refills: 1 | Status: SHIPPED | OUTPATIENT
Start: 2018-07-09 | End: 2018-10-08

## 2018-08-03 DIAGNOSIS — M54.16 LUMBAR RADICULOPATHY: ICD-10-CM

## 2018-08-03 DIAGNOSIS — C50.911 PRIMARY BREAST MALIGNANCY, RIGHT (H): ICD-10-CM

## 2018-08-03 RX ORDER — GABAPENTIN 300 MG/1
CAPSULE ORAL
Qty: 180 CAPSULE | Refills: 0 | Status: SHIPPED | OUTPATIENT
Start: 2018-08-03 | End: 2018-11-01

## 2018-08-03 RX ORDER — TAMOXIFEN CITRATE 20 MG/1
20 TABLET ORAL DAILY
Qty: 90 TABLET | Refills: 1 | Status: SHIPPED | OUTPATIENT
Start: 2018-08-03 | End: 2019-02-06

## 2018-08-03 NOTE — TELEPHONE ENCOUNTER
tamoxifen (NOLVADEX) 20 MG tablet      Last Written Prescription Date:  01/17/2018  Last Fill Quantity: 90,   # refills: 1  Last Office Visit: 02/01/2018  Future Office visit:    Next 5 appointments (look out 90 days)     Aug 17, 2018  1:30 PM CDT   (Arrive by 1:15 PM)   Return Visit with Radha Sales NP   [facility] [place] ([facility] )    [address] [place] [address]   [phone]                   Routing refill request to provider for review/approval because:  Drug not on the [facility], [facility] or [facility] refill protocol or controlled substance

## 2018-08-03 NOTE — TELEPHONE ENCOUNTER
Gabapentin      Last Written Prescription Date:  5.7.18  Last Fill Quantity: #180,   # refills: 0  Last Office Visit: 2.1.18  Future Office visit:    Next 5 appointments (look out 90 days)     Aug 17, 2018  1:30 PM CDT   (Arrive by 1:15 PM)   Return Visit with Radha Sales NP   [facility] [place] ([facility] )    [address]  [place] [address]   [phone]                   Routing refill request to provider for review/approval because:  Drug not on the [facility], [facility] or [facility] refill protocol or controlled substance

## 2018-08-10 DIAGNOSIS — D64.9 ANEMIA, UNSPECIFIED TYPE: ICD-10-CM

## 2018-08-10 DIAGNOSIS — C50.921 MALIGNANT NEOPLASM OF RIGHT MALE BREAST, UNSPECIFIED ESTROGEN RECEPTOR STATUS, UNSPECIFIED SITE OF BREAST (H): ICD-10-CM

## 2018-08-10 LAB
ALBUMIN SERPL-MCNC: 3.6 G/DL (ref 3.4–5)
ALP SERPL-CCNC: 75 U/L (ref 40–150)
ALT SERPL W P-5'-P-CCNC: 24 U/L (ref 0–70)
ANION GAP SERPL CALCULATED.3IONS-SCNC: 10 MMOL/L (ref 3–14)
AST SERPL W P-5'-P-CCNC: 23 U/L (ref 0–45)
BASOPHILS # BLD AUTO: 0 10E9/L (ref 0–0.2)
BASOPHILS NFR BLD AUTO: 0.5 %
BILIRUB SERPL-MCNC: 0.4 MG/DL (ref 0.2–1.3)
BUN SERPL-MCNC: 24 MG/DL (ref 7–30)
CALCIUM SERPL-MCNC: 9 MG/DL (ref 8.5–10.1)
CHLORIDE SERPL-SCNC: 108 MMOL/L (ref 94–109)
CO2 SERPL-SCNC: 24 MMOL/L (ref 20–32)
CREAT SERPL-MCNC: 1.11 MG/DL (ref 0.66–1.25)
DIFFERENTIAL METHOD BLD: ABNORMAL
EOSINOPHIL # BLD AUTO: 0.4 10E9/L (ref 0–0.7)
EOSINOPHIL NFR BLD AUTO: 6.7 %
ERYTHROCYTE [DISTWIDTH] IN BLOOD BY AUTOMATED COUNT: 15.7 % (ref 10–15)
FERRITIN SERPL-MCNC: 121 NG/ML (ref 26–388)
GFR SERPL CREATININE-BSD FRML MDRD: 63 ML/MIN/1.7M2
GLUCOSE SERPL-MCNC: 110 MG/DL (ref 70–99)
HCT VFR BLD AUTO: 34.8 % (ref 40–53)
HGB BLD-MCNC: 11.6 G/DL (ref 13.3–17.7)
IRON SATN MFR SERPL: 27 % (ref 15–46)
IRON SERPL-MCNC: 43 UG/DL (ref 35–180)
LYMPHOCYTES # BLD AUTO: 1.5 10E9/L (ref 0.8–5.3)
LYMPHOCYTES NFR BLD AUTO: 23.2 %
MCH RBC QN AUTO: 30.2 PG (ref 26.5–33)
MCHC RBC AUTO-ENTMCNC: 33.3 G/DL (ref 31.5–36.5)
MCV RBC AUTO: 91 FL (ref 78–100)
MONOCYTES # BLD AUTO: 0.6 10E9/L (ref 0–1.3)
MONOCYTES NFR BLD AUTO: 9.3 %
NEUTROPHILS # BLD AUTO: 3.9 10E9/L (ref 1.6–8.3)
NEUTROPHILS NFR BLD AUTO: 60.3 %
PLATELET # BLD AUTO: 262 10E9/L (ref 150–450)
POTASSIUM SERPL-SCNC: 4.3 MMOL/L (ref 3.4–5.3)
PROT SERPL-MCNC: 7.1 G/DL (ref 6.8–8.8)
RBC # BLD AUTO: 3.84 10E12/L (ref 4.4–5.9)
SODIUM SERPL-SCNC: 142 MMOL/L (ref 133–144)
TIBC SERPL-MCNC: 157 UG/DL (ref 240–430)
WBC # BLD AUTO: 6.4 10E9/L (ref 4–11)

## 2018-08-10 PROCEDURE — 85025 COMPLETE CBC W/AUTO DIFF WBC: CPT | Mod: ZL | Performed by: NURSE PRACTITIONER

## 2018-08-10 PROCEDURE — 83540 ASSAY OF IRON: CPT | Mod: ZL | Performed by: NURSE PRACTITIONER

## 2018-08-10 PROCEDURE — 82728 ASSAY OF FERRITIN: CPT | Mod: ZL | Performed by: NURSE PRACTITIONER

## 2018-08-10 PROCEDURE — 83550 IRON BINDING TEST: CPT | Mod: ZL | Performed by: NURSE PRACTITIONER

## 2018-08-10 PROCEDURE — 86300 IMMUNOASSAY TUMOR CA 15-3: CPT | Mod: ZL | Performed by: NURSE PRACTITIONER

## 2018-08-10 PROCEDURE — 36415 COLL VENOUS BLD VENIPUNCTURE: CPT | Mod: ZL | Performed by: NURSE PRACTITIONER

## 2018-08-10 PROCEDURE — 99000 SPECIMEN HANDLING OFFICE-LAB: CPT | Performed by: NURSE PRACTITIONER

## 2018-08-10 PROCEDURE — 80053 COMPREHEN METABOLIC PANEL: CPT | Mod: ZL | Performed by: NURSE PRACTITIONER

## 2018-08-13 LAB — CANCER AG27-29 SERPL-ACNC: 34 U/ML

## 2018-08-17 ENCOUNTER — ONCOLOGY VISIT (OUTPATIENT)
Dept: ONCOLOGY | Facility: OTHER | Age: 83
End: 2018-08-17
Attending: NURSE PRACTITIONER
Payer: COMMERCIAL

## 2018-08-17 VITALS
HEIGHT: 67 IN | WEIGHT: 172 LBS | HEART RATE: 84 BPM | OXYGEN SATURATION: 94 % | BODY MASS INDEX: 27 KG/M2 | SYSTOLIC BLOOD PRESSURE: 118 MMHG | RESPIRATION RATE: 20 BRPM | TEMPERATURE: 97.2 F | DIASTOLIC BLOOD PRESSURE: 62 MMHG

## 2018-08-17 DIAGNOSIS — C50.921 MALIGNANT NEOPLASM OF RIGHT MALE BREAST, UNSPECIFIED ESTROGEN RECEPTOR STATUS, UNSPECIFIED SITE OF BREAST (H): Primary | ICD-10-CM

## 2018-08-17 DIAGNOSIS — Z15.09 BRCA2 POSITIVE: ICD-10-CM

## 2018-08-17 DIAGNOSIS — Z15.01 BRCA2 POSITIVE: ICD-10-CM

## 2018-08-17 PROCEDURE — 99213 OFFICE O/P EST LOW 20 MIN: CPT | Performed by: NURSE PRACTITIONER

## 2018-08-17 PROCEDURE — G0463 HOSPITAL OUTPT CLINIC VISIT: HCPCS

## 2018-08-17 ASSESSMENT — PAIN SCALES - GENERAL: PAINLEVEL: NO PAIN (0)

## 2018-08-17 NOTE — MR AVS SNAPSHOT
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 Please come 1 week prior for lab work.  When you are in need of a refill, please call your pharmacy and they will send us a request. If you have any questions please call [phone]  Other instructions:  none          Follow-ups after your visit        Your next 10 appointments already scheduled     Dec 11, 2018  9:30 AM CST   LAB with NA LAB   [facility] ([facility] )    [address] [place] [address]   [phone]            Dec 18, 2018  1:40 PM CST   (Arrive by 1:25 PM)   Return Visit with Radha Sales NP   [facility] [place] ([facility] [place] )    [address]  [place] [address]   [phone]              Future tests that were ordered for you today     Open Future Orders        Priority Expected Expires Ordered    CBC with platelets differential Routine 12/17/2018 8/17/2019 8/17/2018    Comprehensive metabolic panel Routine 12/17/2018 8/17/2019 8/17/2018    Ca27.29  breast tumor marker Routine 12/17/2018 8/17/2019 8/17/2018            Who to contact     If you have questions or need follow up information about today's clinic visit or your schedule please contact [facility] directly at [phone].  Normal or non-critical lab and imaging results will be communicated to you by MyChart, letter or phone within 4 business days after the clinic has received the results. If you do not hear from us within 7 days,  "please contact the clinic through [employer] or phone. If you have a critical or abnormal lab result, we will notify you by phone as soon as possible.  Submit refill requests through [employer] or call your pharmacy and they will forward the refill request to us. Please allow 3 business days for your refill to be completed.          Additional Information About Your Visit        Care EveryWhere ID     This is your Care EveryWhere ID. This could be used by other organizations to access your [place] medical records  [id number]        Your Vitals Were     Pulse Temperature Respirations Height Pulse Oximetry BMI (Body Mass Index)    84 97.2  F (36.2  C) (Tympanic) 20 1.702 m (5' 7\") 94% 26.94 kg/m2       Blood Pressure from Last 3 Encounters:   08/17/18 118/62   04/20/18 128/80   02/01/18 136/74    Weight from Last 3 Encounters:   08/17/18 78 kg (172 lb)   04/20/18 77.8 kg (171 lb 9.6 oz)   02/01/18 79.4 kg (175 lb)               Primary Care Provider Office Phone # Fax #    Rubén Wren [phone]       [address] [place] [address]        Equal Access to Services     DEEP ROBERTSON AH: Hadii luis manuel ku hadasho Soomaali, waaxda luqadaha, qaybta kaalmada adeegyada, eileen snell . So [facility] [phone].    ATENCIÓN: Si habla español, tiene a dc disposición servicios gratuitos de asistencia lingüística. [facility] [phone].    We comply with applicable federal civil rights laws and [place] laws. We do not discriminate on the basis of race, color, national origin, age, disability, sex, sexual orientation, or gender identity.            Thank you!     Thank you for choosing [facility]  for your care. Our goal is always to provide you with excellent care. Hearing back from our patients is one way we can continue to improve our services. Please take a few minutes to complete the written survey that you may receive in the mail after your visit with us. Thank " you!             Your Updated Medication List - Protect others around you: Learn how to safely use, store and throw away your medicines at www.disposemymeds.org.          This list is accurate as of 8/17/18  2:24 PM.  Always use your most recent med list.                   Brand Name Dispense Instructions for use Diagnosis    ALPRAZolam 0.5 MG tablet    XANAX    180 tablet    TAKE 1 OR 2 TABLETS BY MOUTH AT BEDTIME AS NEEDED    Anxiety       atenolol 50 MG tablet    TENORMIN    90 tablet    TAKE 1 TABLET BY MOUTH DAILY    Benign essential hypertension       gabapentin 300 MG capsule    NEURONTIN    180 capsule    TAKE 1 CAPSULE BY MOUTH 2 TIMES A DAY    Lumbar radiculopathy       levothyroxine 75 MCG tablet    SYNTHROID/LEVOTHROID    90 tablet    TAKE 1 TABLET BY MOUTH DAILY    Hypothyroidism       MELATONIN PO      Take 5 mg by mouth nightly as needed        omeprazole 20 MG CR capsule    priLOSEC    90 capsule    TAKE 1 CAPSULE BY MOUTH DAILY    Gastroesophageal reflux disease, esophagitis presence not specified       order for DME     1 each    Equipment being ordered: right hand compression glove    Lymphedema       pravastatin 40 MG tablet    PRAVACHOL    135 tablet    TAKE 1 TABLET BY MOUTH EVERY MORNING AND 1 EVERY OTHER EVENING    Hyperlipidemia LDL goal <130       sennosides 8.6 MG tablet    SENOKOT     Take 2 tablets by mouth At Bedtime Reported on 5/17/2017        tamoxifen 20 MG tablet    NOLVADEX    90 tablet    Take 1 tablet (20 mg) by mouth daily    Primary breast malignancy, right (H)       tamsulosin 0.4 MG capsule    FLOMAX    180 capsule    TAKE 1 CAPSULE BY MOUTH EVERY MORNING AND 1 CAPSULE AT NIGHT    Benign prostatic hyperplasia, unspecified whether lower urinary tract symptoms present       triamcinolone 0.5 % cream    KENALOG    30 g    Apply sparingly to affected area three times daily.    Rash       VENTOLIN  (90 Base) MCG/ACT inhaler   Generic drug:  albuterol     18 each    USE 2  PUFFS EVERY 6 HOURS AS NEEDED FOR SHORTNESS OF BREATH/DYSPNEA    SOB (shortness of breath)       VITAMIN C PO      Take 500 mg by mouth daily        zolpidem 5 MG tablet    AMBIEN    90 tablet    TAKE 1 TABLET BY MOUTH NIGHTLY AS NEEDED FOR SLEEP    Persistent insomnia

## 2018-08-17 NOTE — PROGRESS NOTES
Oncology Follow-up Visit:  August 17, 2018    Reason for Visit:  Patient presents with:  RECHECK: Follow up Malignant neoplasm of right male breast      Nursing Note and documentation reviewed: yes    HPI: This is an 84-year-old male patient who presents to the oncology/hematology clinic today in follow up of right breast cancer.  Patient underwent a right mastectomy with axillary node dissection in July 2015. He was placed on 20 mg of tamoxifen and remains on this.   He presents today stating he is feeling really good.  He states he is feeling better now than he did a year ago.  He has got energy and remains very active.  He will have occasional days where he has some mild fatigue.  He continues with lymphedema to the right upper extremity and does use his sleeve and glove daily toward the end of the day.  He essentially has no new complaints today.    Oncologic History: Patient had presented with complaints of right nipple irritation over a six-month period and was noted to have a mass in the right breast. Mammogram was completed followed by biopsy and pathology showed an invasive carcinoma. On 7/27/2015, he underwent a right modified radical mastectomy with axillary node dissection by Dr. Garsia. 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 DCIS was present, grade 2 with expansive comedonecrosis. Node dissection revealed 1/17 nodes revealing micrometastasis. Tumor was ER/AZ positive, HER-2/thomas negative. Patient was staged pathologic T1c N1mi M0.      At consultation, patient was not interested in adjuvant chemotherapy and was placed on tamoxifen 20 mg daily.      BRCA 2+      Current Chemo Regime/TX: Tamoxifen 20mg daily  Current Cycle: n/a  # of completed cycles: n/a      Previous treatment:  n/a     Past Medical History:   Diagnosis Date     Dysphagia 06/28/2004     HTN (hypertension)      Need  for prophylactic vaccination and inoculation 12/10/2001     Need for prophylactic vaccination and inoculation, 11/06/2003     Other and unspecified hyperlipidemia 05/01/2001     Prediabetes      Shortness of breath 03/28/2006     Shoulder pain 06/15/2012     Unspecified essential hypertension 05/18/2000       Past Surgical History:   Procedure Laterality Date     CARDIAC SURGERY      angiogram 1992 [facility]     COLONOSCOPY      1998     GENITOURINARY SURGERY      prostate bx [facility]     GENITOURINARY SURGERY      Prostate bx [facility]     GI SURGERY      EGD     GI SURGERY      Pt has EGD with removal of food from esophagus     MASTECTOMY SIMPLE, SENTINEL NODE, COMBINED Right 7/27/2015    Procedure: COMBINED MASTECTOMY SIMPLE, SENTINEL NODE;  Surgeon: Aliyah Garsia MD;  Location: [facility]     ORTHOPEDIC SURGERY      bilateral knee replacements       Family History   Problem Relation Age of Onset     Cancer - colorectal Mother      Eye Disorder Mother      cataracts     Musculoskeletal Disorder Mother      CTS     Colon Cancer Mother      Other Cancer Mother      Cancer Father      lung cancer     Genitourinary Problems Father      prostate problems     Other Cancer Father      lung     C.A.D. Brother      CABG     Eye Disorder Brother      cataracts     Breast Cancer Other      Ovarian Cancer Other      Pancreatic Cancer Other      Diabetes No family hx of      Coronary Artery Disease No family hx of      Cerebrovascular Disease No family hx of      Hyperlipidemia No family hx of      Hypertension No family hx of      Prostate Cancer No family hx of      Thyroid Disease No family hx of      Genetic Disorder No family hx of      Asthma No family hx of      Anesthesia Reaction No family hx of        Social History     Social History     Marital status:      Spouse name: Marisa     Number of children: N/A     Years of education: N/A     Occupational History     Not on file.     Social History Main Topics      Smoking status: Former Smoker     Types: Cigarettes     Start date: 8/17/1948     Quit date: 1/17/1995     Smokeless tobacco: Never Used      Comment: tried to quit-yes     Alcohol use No      Comment: former     Drug use: No     Sexual activity: Not on file     Other Topics Concern     Caffeine Concern Yes     coffee, 2 cups daily     Parent/Sibling W/ Cabg, Mi Or Angioplasty Before 65f 55m? No     Social History Narrative       Current Outpatient Prescriptions   Medication     ALPRAZolam (XANAX) 0.5 MG tablet     atenolol (TENORMIN) 50 MG tablet     gabapentin (NEURONTIN) 300 MG capsule     levothyroxine (SYNTHROID/LEVOTHROID) 75 MCG tablet     MELATONIN PO     omeprazole (PRILOSEC) 20 MG CR capsule     order for DME     pravastatin (PRAVACHOL) 40 MG tablet     sennosides (SENOKOT) 8.6 MG tablet     tamoxifen (NOLVADEX) 20 MG tablet     tamsulosin (FLOMAX) 0.4 MG capsule     triamcinolone (KENALOG) 0.5 % cream     zolpidem (AMBIEN) 5 MG tablet     Ascorbic Acid (VITAMIN C PO)     VENTOLIN  (90 BASE) MCG/ACT inhaler     No current facility-administered medications for this visit.         No Known Allergies    Review Of Systems:  Constitutional: denies fever, weight changes, chills, and night sweats.  Eyes: denies blurred or double vision  Ears/Nose/Throat: denies ear pain, nose problems, difficulty swallowing  Respiratory: denies shortness of breath, cough  Skin: denies rash, lesions  Breast/Chest wall: denies pain, lumps or discharge  Cardiovascular: denies chest pain, palpitations, edema  Gastrointestinal: denies abdominal pain, bloating, nausea, vomiting, early satiety  Genitourinary: denies difficulty with urination, blood in urine  Musculoskeletal:denies new muscle pain, bone pain  Neurologic: denies lightheadedness, headaches, numbness or tingling-neurontin at HS  Psychiatric: denies anxiety, depression  Hematologic/Lymphatic/Immunologic: denies easy bruising, easy bleeding, lumps or bumps  "noted  Endocrine: Denies increased thirst    Physical Exam:  /62  Pulse 84  Temp 97.2  F (36.2  C) (Tympanic)  Resp 20  Ht 1.702 m (5' 7\")  Wt 78 kg (172 lb)  SpO2 94%  BMI 26.94 kg/m2    GENERAL APPEARANCE: Healthy, alert and in no acute distress.  HEENT: Normocephalic, Sclerae anicteric. Oropharynx without ulcers, lesions, or thrush.  NECK: No asymmetry or masses, no thyromegaly.  LYMPHATICS: No palpable cervical, supraclavicular, axillary, or inguinal nodes   RESP: Lungs with a few scattered rales and expiratory wheezes faint to lower lobes bilaterally, respirations regular and easy  CARDIOVASCULAR: Regular rate and rhythm.   ABDOMEN: Soft, nontender. Bowel sounds auscultated all 4 quadrants. No palpable organomegaly or masses.  BREAST/Chest wall: no lumps, masses or tenderness bilaterally  MUSCULOSKELETAL: Extremities without gross deformities noted. No edema of bilateral lower extremities.  NEURO: Alert and oriented x 3.  Gait steady.  PSYCHIATRIC: Mentation and affect appear normal.  Mood appropriate.    Laboratory:  Results for orders placed or performed in visit on 08/10/18   CBC with platelets differential   Result Value Ref Range    WBC 6.4 4.0 - 11.0 10e9/L    RBC Count 3.84 (L) 4.4 - 5.9 10e12/L    Hemoglobin 11.6 (L) 13.3 - 17.7 g/dL    Hematocrit 34.8 (L) 40.0 - 53.0 %    MCV 91 78 - 100 fl    MCH 30.2 26.5 - 33.0 pg    MCHC 33.3 31.5 - 36.5 g/dL    RDW 15.7 (H) 10.0 - 15.0 %    Platelet Count 262 150 - 450 10e9/L    Diff Method Automated Method     % Neutrophils 60.3 %    % Lymphocytes 23.2 %    % Monocytes 9.3 %    % Eosinophils 6.7 %    % Basophils 0.5 %    Absolute Neutrophil 3.9 1.6 - 8.3 10e9/L    Absolute Lymphocytes 1.5 0.8 - 5.3 10e9/L    Absolute Monocytes 0.6 0.0 - 1.3 10e9/L    Absolute Eosinophils 0.4 0.0 - 0.7 10e9/L    Absolute Basophils 0.0 0.0 - 0.2 10e9/L   Comprehensive metabolic panel   Result Value Ref Range    Sodium 142 133 - 144 mmol/L    Potassium 4.3 3.4 - 5.3 " mmol/L    Chloride 108 94 - 109 mmol/L    Carbon Dioxide 24 20 - 32 mmol/L    Anion Gap 10 3 - 14 mmol/L    Glucose 110 (H) 70 - 99 mg/dL    Urea Nitrogen 24 7 - 30 mg/dL    Creatinine 1.11 0.66 - 1.25 mg/dL    GFR Estimate 63 >60 mL/min/1.7m2    GFR Estimate If Black 76 >60 mL/min/1.7m2    Calcium 9.0 8.5 - 10.1 mg/dL    Bilirubin Total 0.4 0.2 - 1.3 mg/dL    Albumin 3.6 3.4 - 5.0 g/dL    Protein Total 7.1 6.8 - 8.8 g/dL    Alkaline Phosphatase 75 40 - 150 U/L    ALT 24 0 - 70 U/L    AST 23 0 - 45 U/L   Ca27.29  breast tumor marker   Result Value Ref Range    CA 27-29 34 U/mL   Ferritin   Result Value Ref Range    Ferritin 121 26 - 388 ng/mL   Iron and iron binding capacity   Result Value Ref Range    Iron 43 35 - 180 ug/dL    Iron Binding Cap 157 (L) 240 - 430 ug/dL    Iron Saturation Index 27 15 - 46 %       Imaging Studies:  None for today      ASSESSMENT/PLAN:    #1  Breast cancer:  Diagnosed with Stage 1, pT1c N1mi M0 carcinoma of the right breast in July 2015.  He underwent mastectomy with node dissection with 1/17 nodes revealing micrometastases; tumor was 2cm, ER/NC positive, HER-2/thomas negative.  BRCA2 positive.  He'll continue with tamoxifen 20 mg daily and follow up in 4 months with a CBC, CMP and CA 27.29.     #2  BRCA2 positive:  Following with surveillance for breast cancer.  We discussed repeating a PSA and he does not want this at this point.    I encouraged patient to call with any questions or concerns.      Radha GONSALVES, FNP-BC, AOCNP

## 2018-08-17 NOTE — NURSING NOTE
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    Weight as of this encounter: 78 kg (172 lb).  Medication Reconciliation: complete   Immunizations reviewed and up to date, has info on advanced directives, pain = 0, PHQ9 = 0.    Brianna Alexandra LPN    "

## 2018-08-18 ASSESSMENT — PATIENT HEALTH QUESTIONNAIRE - PHQ9: SUM OF ALL RESPONSES TO PHQ QUESTIONS 1-9: 0

## 2018-09-04 DIAGNOSIS — G47.00 PERSISTENT INSOMNIA: ICD-10-CM

## 2018-09-04 DIAGNOSIS — F41.9 ANXIETY: ICD-10-CM

## 2018-09-05 NOTE — TELEPHONE ENCOUNTER
Xanax      Last Written Prescription Date:  6/7/18  Last Fill Quantity: 180,   # refills: 0  Last Office Visit: 2/1/18  Future Office visit:      Ambien     Last Written Prescription Date:  6/6/18  Last Fill Quantity: 90,   # refills: 0  Last Office Visit: 2/1/18  Future Office visit:

## 2018-09-06 RX ORDER — ALPRAZOLAM 0.5 MG
TABLET ORAL
Qty: 180 TABLET | Refills: 0 | Status: SHIPPED | OUTPATIENT
Start: 2018-09-06 | End: 2018-12-03

## 2018-09-06 RX ORDER — ZOLPIDEM TARTRATE 5 MG/1
TABLET ORAL
Qty: 90 TABLET | Refills: 0 | Status: SHIPPED | OUTPATIENT
Start: 2018-09-06 | End: 2018-12-03

## 2018-09-17 DIAGNOSIS — I10 BENIGN ESSENTIAL HYPERTENSION: ICD-10-CM

## 2018-09-17 NOTE — TELEPHONE ENCOUNTER
ATENOLOL 50 MG TABLET      Last Written Prescription Date:  7/5/17  Last Fill Quantity: 90,   # refills: 2  Last Office Visit: 2/1/18  Future Office visit:

## 2018-09-18 RX ORDER — ATENOLOL 50 MG/1
TABLET ORAL
Qty: 90 TABLET | Refills: 0 | Status: SHIPPED | OUTPATIENT
Start: 2018-09-18 | End: 2018-12-18

## 2018-10-08 DIAGNOSIS — N40.0 BENIGN PROSTATIC HYPERPLASIA, UNSPECIFIED WHETHER LOWER URINARY TRACT SYMPTOMS PRESENT: ICD-10-CM

## 2018-10-11 RX ORDER — TAMSULOSIN HYDROCHLORIDE 0.4 MG/1
CAPSULE ORAL
Qty: 180 CAPSULE | Refills: 0 | Status: SHIPPED | OUTPATIENT
Start: 2018-10-11 | End: 2019-01-04

## 2018-10-24 ENCOUNTER — ALLIED HEALTH/NURSE VISIT (OUTPATIENT)
Dept: FAMILY MEDICINE | Facility: OTHER | Age: 83
End: 2018-10-24
Attending: FAMILY MEDICINE
Payer: MEDICARE

## 2018-10-24 DIAGNOSIS — Z23 NEED FOR PROPHYLACTIC VACCINATION AND INOCULATION AGAINST INFLUENZA: Primary | ICD-10-CM

## 2018-10-24 PROCEDURE — 90662 IIV NO PRSV INCREASED AG IM: CPT

## 2018-10-24 PROCEDURE — G0008 ADMIN INFLUENZA VIRUS VAC: HCPCS

## 2018-10-24 NOTE — MR AVS SNAPSHOT
After Visit Summary   10/24/2018    Lio Daly    MRN: [id number]           Patient Information     Date Of Birth          5/28/1934        Visit Information        Provider Department      10/24/2018 10:20 AM NA FLU SHOT CLINIC [facility]        Today's Diagnoses     Need for prophylactic vaccination and inoculation against influenza    -  1       Follow-ups after your visit        Your next 10 appointments already scheduled     Oct 24, 2018 10:20 AM CDT   (Arrive by 10:05 AM)   Flu Shot with NA FLU SHOT CLINIC   [facility]    [address]  [facility] [address]   [phone]            Dec 11, 2018  9:30 AM CST   LAB with NA LAB   [facility]    [address]  [facility] [address]   [phone]            Dec 19, 2018  1:40 PM CST   (Arrive by 1:25 PM)   [facility] Radha Sales NP   [facility]    [address] [address]   [phone]              Who to contact     If you have questions or need follow up information about today's clinic visit or your schedule please contact [facility] directly at [phone].  Normal or non-critical lab and imaging results will be communicated to you by MyChart, letter or phone within 4 business days after the clinic has received the results. If you do not hear from us within 7 days, please contact the clinic through MyChart or phone. If you have a critical or abnormal lab result, we will notify you by phone as soon as possible.  Submit refill requests through [employer] or call your pharmacy and they will forward the refill request to us. Please allow 3 business days for your refill to be completed.          Additional Information About Your Visit        Care EveryWhere ID     This is your Care EveryWhere  ID. This could be used by other organizations to access your [place] medical records  [id number]         Blood Pressure from Last 3 Encounters:   08/17/18 118/62   04/20/18 128/80   02/01/18 136/74    Weight from Last 3 Encounters:   08/17/18 172 lb (78 kg)   04/20/18 171 lb 9.6 oz (77.8 kg)   02/01/18 175 lb (79.4 kg)              We Performed the Following     ADMIN INFLUENZA (For MEDICARE Patients ONLY) []     HC FLU VACCINE, INCREASED ANTIGEN, PRESV FREE        Primary Care Provider Office Phone # Fax #    Rubén Wren [phone]       [address] [place] [id number]        Equal Access to Services     DEEP ROBERTSON : Hadii aad ku hadasho Sojack, waaxda luqadaha, qaybta kaalmada adeegyada, waxay santanain rain snell . So [facility] [phone].    ATENCIÓN: Si habla español, tiene a dc disposición servicios gratuitos de asistencia lingüística. [facility] [phone].    We comply with applicable federal civil rights laws and [place] laws. We do not discriminate on the basis of race, color, national origin, age, disability, sex, sexual orientation, or gender identity.            Thank you!     Thank you for choosing [facility]  for your care. Our goal is always to provide you with excellent care. Hearing back from our patients is one way we can continue to improve our services. Please take a few minutes to complete the written survey that you may receive in the mail after your visit with us. Thank you!             Your Updated Medication List - Protect others around you: Learn how to safely use, store and throw away your medicines at www.disposemymeds.org.          This list is accurate as of 10/24/18 10:14 AM.  Always use your most recent med list.                   Brand Name Dispense Instructions for use Diagnosis    ALPRAZolam 0.5 MG tablet    XANAX    180 tablet    TAKE 1 OR 2 TABLETS BY MOUTH AT BEDTIME AS NEEDED    Anxiety        atenolol 50 MG tablet    TENORMIN    90 tablet    TAKE 1 TABLET BY MOUTH DAILY    Benign essential hypertension       gabapentin 300 MG capsule    NEURONTIN    180 capsule    TAKE 1 CAPSULE BY MOUTH 2 TIMES A DAY    Lumbar radiculopathy       levothyroxine 75 MCG tablet    SYNTHROID/LEVOTHROID    90 tablet    TAKE 1 TABLET BY MOUTH DAILY    Hypothyroidism       MELATONIN PO      Take 5 mg by mouth nightly as needed        omeprazole 20 MG CR capsule    priLOSEC    90 capsule    TAKE 1 CAPSULE BY MOUTH DAILY    Gastroesophageal reflux disease, esophagitis presence not specified       order for DME     1 each    Equipment being ordered: right hand compression glove    Lymphedema       pravastatin 40 MG tablet    PRAVACHOL    135 tablet    TAKE 1 TABLET BY MOUTH EVERY MORNING AND 1 EVERY OTHER EVENING    Hyperlipidemia LDL goal <130       sennosides 8.6 MG tablet    SENOKOT     Take 2 tablets by mouth At Bedtime Reported on 5/17/2017        tamoxifen 20 MG tablet    NOLVADEX    90 tablet    Take 1 tablet (20 mg) by mouth daily    Primary breast malignancy, right (H)       tamsulosin 0.4 MG capsule    FLOMAX    180 capsule    TAKE 1 CAPSULE BY MOUTH EVERY MORNING AND 1 CAPSULE AT NIGHT    Benign prostatic hyperplasia, unspecified whether lower urinary tract symptoms present       triamcinolone 0.5 % cream    KENALOG    30 g    Apply sparingly to affected area three times daily.    Rash       VENTOLIN  (90 Base) MCG/ACT inhaler   Generic drug:  albuterol     18 each    USE 2 PUFFS EVERY 6 HOURS AS NEEDED FOR SHORTNESS OF BREATH/DYSPNEA    SOB (shortness of breath)       VITAMIN C PO      Take 500 mg by mouth daily        zolpidem 5 MG tablet    AMBIEN    90 tablet    TAKE 1 TABLET BY MOUTH NIGHTLY AS NEEDED FOR SLEEP    Persistent insomnia

## 2018-11-01 DIAGNOSIS — M54.16 LUMBAR RADICULOPATHY: ICD-10-CM

## 2018-11-01 RX ORDER — GABAPENTIN 300 MG/1
CAPSULE ORAL
Qty: 180 CAPSULE | Refills: 0 | Status: SHIPPED | OUTPATIENT
Start: 2018-11-01 | End: 2019-01-31

## 2018-11-01 NOTE — TELEPHONE ENCOUNTER
Neurontin   Last visit: 2.1.18  Last refill: 8.3.18 #180    Next 5 appointments (look out 90 days)     Dec 19, 2018  1:40 PM CST   (Arrive by 1:25 PM)   Return Visit with Radha Sales NP   [facility]    [address] [place] [address]   [phone]

## 2018-11-19 DIAGNOSIS — E78.5 HYPERLIPIDEMIA LDL GOAL <130: ICD-10-CM

## 2018-11-19 DIAGNOSIS — E03.9 HYPOTHYROIDISM: ICD-10-CM

## 2018-11-19 NOTE — TELEPHONE ENCOUNTER
Levothyroxine       Last Written Prescription Date:  2/13/18  Last Fill Quantity: 90,   # refills: 2  Last Office Visit: 2/1/18  Future Office visit:    Next 5 appointments (look out 90 days)     Dec 19, 2018  1:40 PM CST   (Arrive by 1:25 PM)   [facility]    [address] [place] [address]   [phone]                   Pravastatin       Last Written Prescription Date:  5/7/18  Last Fill Quantity: 135,   # refills: 1  Last Office Visit: 2/1/18  Future Office visit:    Next 5 appointments (look out 90 days)     Dec 19, 2018  1:40 PM CST   (Arrive by 1:25 PM)   [facility]    [address] [place] [address]   [phone]

## 2018-11-20 RX ORDER — PRAVASTATIN SODIUM 40 MG
TABLET ORAL
Qty: 135 TABLET | Refills: 0 | Status: SHIPPED | OUTPATIENT
Start: 2018-11-20 | End: 2019-02-18

## 2018-11-20 RX ORDER — LEVOTHYROXINE SODIUM 75 UG/1
TABLET ORAL
Qty: 90 TABLET | Refills: 0 | Status: SHIPPED | OUTPATIENT
Start: 2018-11-20 | End: 2019-02-18

## 2018-12-03 DIAGNOSIS — F41.9 ANXIETY: ICD-10-CM

## 2018-12-03 DIAGNOSIS — G47.00 PERSISTENT INSOMNIA: ICD-10-CM

## 2018-12-03 DIAGNOSIS — R21 RASH: ICD-10-CM

## 2018-12-04 NOTE — TELEPHONE ENCOUNTER
xanax      Last Written Prescription Date:  9/6/18  Last Fill Quantity: 180,   # refills: 0  Last Office Visit: 2/1/18  Future Office visit:    Next 5 appointments (look out 90 days)     Dec 19, 2018  1:40 PM CST   (Arrive by 1:25 PM)   [facility]    [address] [place] [address]   [phone]                   Routing refill request to provider for review/approval because:  Drug not on the [facility], [facility] or [facility] refill protocol or controlled substance    ambien      Last Written Prescription Date:  9/16/18  Last Fill Quantity: 90,   # refills: 0  Last Office Visit: 2/1/18  Future Office visit:    Next 5 appointments (look out 90 days)     Dec 19, 2018  1:40 PM CST   (Arrive by 1:25 PM)   [facility]    [address] [place] [address]   [phone]                   Routing refill request to provider for review/approval because:  Drug not on the [facility], [facility] or [facility] refill protocol or controlled substance        triamcinolone (KENALOG) 0.5 % external cream        Last Written Prescription Date:  2/1/18  Last Fill Quantity: 30g,   # refills: 1  Last Office Visit: 2/1/18  Future Office visit:    Next 5 appointments (look out 90 days)     Dec 19, 2018  1:40 PM CST   (Arrive by 1:25 PM)   [facility]    [address] [place] [address]   [phone]                   Routing refill request to provider for review/approval because:  Drug not on the [facility], [facility] or [facility] refill protocol or controlled substance

## 2018-12-05 DIAGNOSIS — G47.00 PERSISTENT INSOMNIA: ICD-10-CM

## 2018-12-05 DIAGNOSIS — R21 RASH: ICD-10-CM

## 2018-12-05 DIAGNOSIS — F41.9 ANXIETY: ICD-10-CM

## 2018-12-05 RX ORDER — ALPRAZOLAM 0.5 MG
TABLET ORAL
Qty: 180 TABLET | Refills: 0 | Status: SHIPPED | OUTPATIENT
Start: 2018-12-05 | End: 2018-12-05

## 2018-12-05 RX ORDER — TRIAMCINOLONE ACETONIDE 5 MG/G
CREAM TOPICAL
Qty: 30 G | Refills: 0 | Status: SHIPPED | OUTPATIENT
Start: 2018-12-05 | End: 2021-05-21

## 2018-12-05 RX ORDER — ZOLPIDEM TARTRATE 5 MG/1
TABLET ORAL
Qty: 90 TABLET | Refills: 0 | Status: SHIPPED | OUTPATIENT
Start: 2018-12-05 | End: 2018-12-05

## 2018-12-06 NOTE — TELEPHONE ENCOUNTER
triamcinolone (KENALOG) 0.5 % external cream     Last Written Prescription Date:  12/5/18  Last Fill Quantity: 30g,   # refills: 0  Last Office Visit: 2/1/18  Future Office visit:    Next 5 appointments (look out 90 days)     Dec 19, 2018  1:40 PM CST   (Arrive by 1:25 PM)   [facility]    [address] [place] [address]   [phone]                     Xanax       Last Written Prescription Date:  12/5/18  Last Fill Quantity: 180,   # refills: 0  Last Office Visit: 2/1/18  Future Office visit:    Next 5 appointments (look out 90 days)     Dec 19, 2018  1:40 PM CST   (Arrive by 1:25 PM)   Return Visit with [facility] [place] ([facility]    [address] [place] [address]   [phone]                   Routing refill request to provider for review/approval because:  Drug not on the [facility], [facility] or [facility] refill protocol or controlled substance    ambien      Last Written Prescription Date:  12/5/18  Last Fill Quantity: 90,   # refills: 0  Last Office Visit: 2/1/18  Future Office visit:    Next 5 appointments (look out 90 days)     Dec 19, 2018  1:40 PM CST   (Arrive by 1:25 PM)   [facility] [place] [facility]    [address] [place] [address]   [phone]                   Routing refill request to provider for review/approval because:  Drug not on the [facility], [facility] or [facility] refill protocol or controlled substance

## 2018-12-07 RX ORDER — TRIAMCINOLONE ACETONIDE 5 MG/G
CREAM TOPICAL
Qty: 30 G | Refills: 0 | Status: SHIPPED | OUTPATIENT
Start: 2018-12-07 | End: 2019-05-24

## 2018-12-07 RX ORDER — ZOLPIDEM TARTRATE 5 MG/1
TABLET ORAL
Qty: 90 TABLET | Refills: 0 | Status: SHIPPED | OUTPATIENT
Start: 2018-12-07 | End: 2019-03-04

## 2018-12-07 RX ORDER — ALPRAZOLAM 0.5 MG
TABLET ORAL
Qty: 180 TABLET | Refills: 0 | Status: SHIPPED | OUTPATIENT
Start: 2018-12-07 | End: 2019-06-03

## 2018-12-11 DIAGNOSIS — Z15.09 BRCA2 POSITIVE: ICD-10-CM

## 2018-12-11 DIAGNOSIS — Z15.01 BRCA2 POSITIVE: ICD-10-CM

## 2018-12-11 DIAGNOSIS — C50.921 MALIGNANT NEOPLASM OF RIGHT MALE BREAST, UNSPECIFIED ESTROGEN RECEPTOR STATUS, UNSPECIFIED SITE OF BREAST (H): ICD-10-CM

## 2018-12-11 LAB
ALBUMIN SERPL-MCNC: 3.6 G/DL (ref 3.4–5)
ALP SERPL-CCNC: 77 U/L (ref 40–150)
ALT SERPL W P-5'-P-CCNC: 19 U/L (ref 0–70)
ANION GAP SERPL CALCULATED.3IONS-SCNC: 5 MMOL/L (ref 3–14)
AST SERPL W P-5'-P-CCNC: 15 U/L (ref 0–45)
BASOPHILS # BLD AUTO: 0.1 10E9/L (ref 0–0.2)
BASOPHILS NFR BLD AUTO: 0.7 %
BILIRUB SERPL-MCNC: 0.3 MG/DL (ref 0.2–1.3)
BUN SERPL-MCNC: 24 MG/DL (ref 7–30)
CALCIUM SERPL-MCNC: 8.5 MG/DL (ref 8.5–10.1)
CHLORIDE SERPL-SCNC: 108 MMOL/L (ref 94–109)
CO2 SERPL-SCNC: 25 MMOL/L (ref 20–32)
CREAT SERPL-MCNC: 1.15 MG/DL (ref 0.66–1.25)
DIFFERENTIAL METHOD BLD: ABNORMAL
EOSINOPHIL # BLD AUTO: 0.6 10E9/L (ref 0–0.7)
EOSINOPHIL NFR BLD AUTO: 8.4 %
ERYTHROCYTE [DISTWIDTH] IN BLOOD BY AUTOMATED COUNT: 15.7 % (ref 10–15)
GFR SERPL CREATININE-BSD FRML MDRD: 61 ML/MIN/1.7M2
GLUCOSE SERPL-MCNC: 116 MG/DL (ref 70–99)
HCT VFR BLD AUTO: 37.3 % (ref 40–53)
HGB BLD-MCNC: 11.6 G/DL (ref 13.3–17.7)
LYMPHOCYTES # BLD AUTO: 2.3 10E9/L (ref 0.8–5.3)
LYMPHOCYTES NFR BLD AUTO: 33 %
MCH RBC QN AUTO: 28.9 PG (ref 26.5–33)
MCHC RBC AUTO-ENTMCNC: 31.1 G/DL (ref 31.5–36.5)
MCV RBC AUTO: 93 FL (ref 78–100)
MONOCYTES # BLD AUTO: 0.6 10E9/L (ref 0–1.3)
MONOCYTES NFR BLD AUTO: 9.1 %
NEUTROPHILS # BLD AUTO: 3.4 10E9/L (ref 1.6–8.3)
NEUTROPHILS NFR BLD AUTO: 48.8 %
PLATELET # BLD AUTO: 259 10E9/L (ref 150–450)
POTASSIUM SERPL-SCNC: 4.4 MMOL/L (ref 3.4–5.3)
PROT SERPL-MCNC: 7.3 G/DL (ref 6.8–8.8)
RBC # BLD AUTO: 4.01 10E12/L (ref 4.4–5.9)
SODIUM SERPL-SCNC: 138 MMOL/L (ref 133–144)
WBC # BLD AUTO: 6.9 10E9/L (ref 4–11)

## 2018-12-11 PROCEDURE — 85025 COMPLETE CBC W/AUTO DIFF WBC: CPT | Mod: ZL | Performed by: NURSE PRACTITIONER

## 2018-12-11 PROCEDURE — 99000 SPECIMEN HANDLING OFFICE-LAB: CPT | Performed by: NURSE PRACTITIONER

## 2018-12-11 PROCEDURE — 86300 IMMUNOASSAY TUMOR CA 15-3: CPT | Mod: ZL | Performed by: NURSE PRACTITIONER

## 2018-12-11 PROCEDURE — 36415 COLL VENOUS BLD VENIPUNCTURE: CPT | Mod: ZL | Performed by: NURSE PRACTITIONER

## 2018-12-11 PROCEDURE — 80053 COMPREHEN METABOLIC PANEL: CPT | Mod: ZL | Performed by: NURSE PRACTITIONER

## 2018-12-12 LAB — CANCER AG27-29 SERPL-ACNC: 57 U/ML

## 2018-12-18 DIAGNOSIS — I10 BENIGN ESSENTIAL HYPERTENSION: ICD-10-CM

## 2018-12-19 ENCOUNTER — ONCOLOGY VISIT (OUTPATIENT)
Dept: ONCOLOGY | Facility: OTHER | Age: 83
End: 2018-12-19
Attending: NURSE PRACTITIONER
Payer: COMMERCIAL

## 2018-12-19 VITALS
WEIGHT: 175 LBS | HEIGHT: 67 IN | OXYGEN SATURATION: 97 % | DIASTOLIC BLOOD PRESSURE: 74 MMHG | HEART RATE: 68 BPM | TEMPERATURE: 98.3 F | BODY MASS INDEX: 27.47 KG/M2 | SYSTOLIC BLOOD PRESSURE: 120 MMHG

## 2018-12-19 DIAGNOSIS — C50.921 MALIGNANT NEOPLASM OF RIGHT MALE BREAST, UNSPECIFIED ESTROGEN RECEPTOR STATUS, UNSPECIFIED SITE OF BREAST (H): Primary | ICD-10-CM

## 2018-12-19 DIAGNOSIS — Z15.09 BRCA2 POSITIVE: ICD-10-CM

## 2018-12-19 DIAGNOSIS — R73.9 ELEVATED BLOOD SUGAR: ICD-10-CM

## 2018-12-19 DIAGNOSIS — Z15.01 BRCA2 POSITIVE: ICD-10-CM

## 2018-12-19 DIAGNOSIS — D64.9 ANEMIA, UNSPECIFIED TYPE: ICD-10-CM

## 2018-12-19 PROCEDURE — G0463 HOSPITAL OUTPT CLINIC VISIT: HCPCS

## 2018-12-19 PROCEDURE — 99214 OFFICE O/P EST MOD 30 MIN: CPT | Performed by: NURSE PRACTITIONER

## 2018-12-19 RX ORDER — ATENOLOL 50 MG/1
TABLET ORAL
Qty: 90 TABLET | Refills: 0 | Status: SHIPPED | OUTPATIENT
Start: 2018-12-19 | End: 2019-04-02

## 2018-12-19 ASSESSMENT — MIFFLIN-ST. JEOR: SCORE: 1442.42

## 2018-12-19 ASSESSMENT — PAIN SCALES - GENERAL: PAINLEVEL: NO PAIN (0)

## 2018-12-19 ASSESSMENT — PATIENT HEALTH QUESTIONNAIRE - PHQ9: SUM OF ALL RESPONSES TO PHQ QUESTIONS 1-9: 0

## 2018-12-19 NOTE — PROGRESS NOTES
Oncology Follow-up Visit:  December 19, 2018    Reason for Visit:  Patient presents with:  RECHECK: follow up malignant neoplasm of right male breast     Nursing Note and documentation reviewed: yes    HPI:  This is an 84-year-old male patient who presents to the oncology/hematology clinic today in follow up of right breast cancer.  Patient underwent a right mastectomy with axillary node dissection in July 2015. He was placed on 20 mg of tamoxifen and remains on this.       He presents today accompanied by his daughter stating he is feeling excellent.  He has no new complaints today.  He continues to use his sleeve and glove on a daily basis for his lymphedema and feels this is actually somewhat improved.  He has no complaints being on tamoxifen.    Oncologic History: Patient had presented with complaints of right nipple irritation over a six-month period and was noted to have a mass in the right breast. Mammogram was completed followed by biopsy and pathology showed an invasive carcinoma. On 7/27/2015, he underwent a right modified radical mastectomy with axillary node dissection by Dr. Garsia. 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 DCIS was present, grade 2 with expansive comedonecrosis. Node dissection revealed 1/17 nodes revealing micrometastasis. Tumor was ER/NJ positive, HER-2/thomas negative. Patient was staged pathologic T1c N1mi M0.      At consultation, patient was not interested in adjuvant chemotherapy and was placed on tamoxifen 20 mg daily.      BRCA 2+      Current Chemo Regime/TX: Tamoxifen 20mg daily initiated 8/2015  Current Cycle: n/a  # of completed cycles: n/a      Previous treatment:  n/a    Past Medical History:   Diagnosis Date     Dysphagia 06/28/2004     HTN (hypertension)      Need for prophylactic vaccination and inoculation 12/10/2001     Need for prophylactic  vaccination and inoculation, 11/06/2003     Other and unspecified hyperlipidemia 05/01/2001     Prediabetes      Shortness of breath 03/28/2006     Shoulder pain 06/15/2012     Unspecified essential hypertension 05/18/2000       Past Surgical History:   Procedure Laterality Date     CARDIAC SURGERY      angiogram 1992 [facility]     COLONOSCOPY      1998     GENITOURINARY SURGERY      prostate bx [place]     GENITOURINARY SURGERY      Prostate bx [facility]     GI SURGERY      EGD     GI SURGERY      Pt has EGD with removal of food from esophagus     MASTECTOMY SIMPLE, SENTINEL NODE, COMBINED Right 7/27/2015    Procedure: COMBINED MASTECTOMY SIMPLE, SENTINEL NODE;  Surgeon: Aliyah Garsia MD;  Location: [facility]     ORTHOPEDIC SURGERY      bilateral knee replacements       Family History   Problem Relation Age of Onset     Cancer - colorectal Mother      Eye Disorder Mother         cataracts     Musculoskeletal Disorder Mother         CTS     Colon Cancer Mother      Other Cancer Mother      Cancer Father         lung cancer     Genitourinary Problems Father         prostate problems     Other Cancer Father         lung     C.A.D. Brother         CABG     Eye Disorder Brother         cataracts     Breast Cancer Other      Ovarian Cancer Other      Pancreatic Cancer Other      Diabetes No family hx of      Coronary Artery Disease No family hx of      Cerebrovascular Disease No family hx of      Hyperlipidemia No family hx of      Hypertension No family hx of      Prostate Cancer No family hx of      Thyroid Disease No family hx of      Genetic Disorder No family hx of      Asthma No family hx of      Anesthesia Reaction No family hx of        Social History     Socioeconomic History     Marital status:      Spouse name: Marisa     Number of children: Not on file     Years of education: Not on file     Highest education level: Not on file   Social Needs     Financial resource strain: Not on file     Food  insecurity - worry: Not on file     Food insecurity - inability: Not on file     Transportation needs - medical: Not on file     Transportation needs - non-medical: Not on file   Occupational History     Not on file   Tobacco Use     Smoking status: Former Smoker     Types: Cigarettes     Start date: 1948     Last attempt to quit: 1995     Years since quittin.9     Smokeless tobacco: Never Used     Tobacco comment: tried to quit-yes   Substance and Sexual Activity     Alcohol use: No     Comment: former     Drug use: No     Sexual activity: Not on file   Other Topics Concern      Service Not Asked     Blood Transfusions Not Asked     Caffeine Concern Yes     Comment: coffee, 2 cups daily     Occupational Exposure Not Asked     Hobby Hazards Not Asked     Sleep Concern Not Asked     Stress Concern Not Asked     Weight Concern Not Asked     Special Diet Not Asked     Back Care Not Asked     Exercise Not Asked     Bike Helmet Not Asked     Seat Belt Not Asked     Self-Exams Not Asked     Parent/sibling w/ CABG, MI or angioplasty before 65F 55M? No   Social History Narrative     Not on file       Current Outpatient Medications   Medication     ALPRAZolam (XANAX) 0.5 MG tablet     Ascorbic Acid (VITAMIN C PO)     atenolol (TENORMIN) 50 MG tablet     gabapentin (NEURONTIN) 300 MG capsule     levothyroxine (SYNTHROID/LEVOTHROID) 75 MCG tablet     MELATONIN PO     omeprazole (PRILOSEC) 20 MG CR capsule     order for DME     pravastatin (PRAVACHOL) 40 MG tablet     sennosides (SENOKOT) 8.6 MG tablet     tamoxifen (NOLVADEX) 20 MG tablet     tamsulosin (FLOMAX) 0.4 MG capsule     zolpidem (AMBIEN) 5 MG tablet     triamcinolone (KENALOG) 0.5 % external cream     triamcinolone (KENALOG) 0.5 % external cream     VENTOLIN  (90 BASE) MCG/ACT inhaler     No current facility-administered medications for this visit.         No Known Allergies    Review Of Systems:  Constitutional:    denies fever, weight  "changes, chills, and night sweats.  Eyes:    denies blurred or double vision  Ears/Nose/Throat:   denies ear pain, nose problems, difficulty swallowing  Respiratory:   denies shortness of breath, cough   Skin:   denies rash, lesions  Breast/Chest wall:   denies pain, lumps or discharge  Cardiovascular:   denies chest pain, palpitations, edema  Gastrointestinal:   denies abdominal pain, bloating, nausea, early satiety; no change in stool habits  Genitourinary:   denies difficulty with urination, blood in urine  Musculoskeletal:    denies new muscle pain, bone pain  Neurologic:   denies lightheadedness, headaches, numbness or tingling  Psychiatric:   denies anxiety, depression  Hematologic/Lymphatic/Immunologic:   denies easy bruising, easy bleeding, lumps or bumps noted  Endocrine:   Dry mouth at times    Physical Exam:  /74 (BP Location: Left arm, Cuff Size: Adult Large)   Pulse 68   Temp 98.3  F (36.8  C) (Tympanic)   Ht 1.702 m (5' 7\")   Wt 79.4 kg (175 lb)   SpO2 97%   BMI 27.41 kg/m      GENERAL APPEARANCE: Healthy, alert and in no acute distress.  HEENT: Normocephalic, Sclerae anicteric. Oropharynx without ulcers, lesions, or thrush.  NECK:   No asymmetry or masses, no thyromegaly.  LYMPHATICS: No palpable cervical, supraclavicular, axillary, or inguinal nodes   RESP: Lungs with faint wheezes on expiration bilaterally, respirations regular and easy  CARDIOVASCULAR: Regular rate and rhythm. Normal S1, S2  ABDOMEN: Soft, nontender. Bowel sounds auscultated all 4 quadrants. No palpable organomegaly or masses.  BREAST/Chest wall: no lumps, masses or tenderness bilaterally  MUSCULOSKELETAL: Extremities without gross deformities noted.  Lymphedema remains to the right upper extremity .no edema of bilateral lower extremities.  NEURO: Alert and oriented x 3.  Gait steady.  PSYCHIATRIC: Mentation and affect appear normal.  Mood appropriate.    Laboratory:  Results for orders placed or performed in visit on " 12/11/18   CBC with platelets differential   Result Value Ref Range    WBC 6.9 4.0 - 11.0 10e9/L    RBC Count 4.01 (L) 4.4 - 5.9 10e12/L    Hemoglobin 11.6 (L) 13.3 - 17.7 g/dL    Hematocrit 37.3 (L) 40.0 - 53.0 %    MCV 93 78 - 100 fl    MCH 28.9 26.5 - 33.0 pg    MCHC 31.1 (L) 31.5 - 36.5 g/dL    RDW 15.7 (H) 10.0 - 15.0 %    Platelet Count 259 150 - 450 10e9/L    % Neutrophils 48.8 %    % Lymphocytes 33.0 %    % Monocytes 9.1 %    % Eosinophils 8.4 %    % Basophils 0.7 %    Absolute Neutrophil 3.4 1.6 - 8.3 10e9/L    Absolute Lymphocytes 2.3 0.8 - 5.3 10e9/L    Absolute Monocytes 0.6 0.0 - 1.3 10e9/L    Absolute Eosinophils 0.6 0.0 - 0.7 10e9/L    Absolute Basophils 0.1 0.0 - 0.2 10e9/L    Diff Method Automated Method    Comprehensive metabolic panel   Result Value Ref Range    Sodium 138 133 - 144 mmol/L    Potassium 4.4 3.4 - 5.3 mmol/L    Chloride 108 94 - 109 mmol/L    Carbon Dioxide 25 20 - 32 mmol/L    Anion Gap 5 3 - 14 mmol/L    Glucose 116 (H) 70 - 99 mg/dL    Urea Nitrogen 24 7 - 30 mg/dL    Creatinine 1.15 0.66 - 1.25 mg/dL    GFR Estimate 61 >60 mL/min/1.7m2    GFR Estimate If Black 73 >60 mL/min/1.7m2    Calcium 8.5 8.5 - 10.1 mg/dL    Bilirubin Total 0.3 0.2 - 1.3 mg/dL    Albumin 3.6 3.4 - 5.0 g/dL    Protein Total 7.3 6.8 - 8.8 g/dL    Alkaline Phosphatase 77 40 - 150 U/L    ALT 19 0 - 70 U/L    AST 15 0 - 45 U/L   Ca27.29  breast tumor marker   Result Value Ref Range    CA 27-29 57 U/mL       Imaging Studies:  None for today      ASSESSMENT/PLAN:    #1  Breast cancer:  Diagnosed with Stage 1, pT1c N1mi M0 carcinoma of the right breast in July 2015.  He underwent mastectomy with node dissection with 1/17 nodes revealing micrometastases; tumor was 2cm, ER/ID positive, HER-2/thomas negative.  BRCA2 positive.  He'll continue with tamoxifen 20 mg daily and follow up in 5 months with a CBC, CMP.     #2  BRCA positive:  Following with his Breast cancer followup.    #3  Anemia:  Stable.    #4  Elevated  blood sugar:  I recommended he discuss with his PCP as he is due for an annual physical in February.    I encouraged patient to call with any questions or concerns.      Radha GONSALVES, FNP-BC, AOCNP

## 2018-12-19 NOTE — NURSING NOTE
"Chief Complaint   Patient presents with     RECHECK     follow up malignant neoplasm of right male breast       Initial /74 (BP Location: Left arm, Cuff Size: Adult Large)   Pulse 68   Temp 98.3  F (36.8  C) (Tympanic)   Ht 1.702 m (5' 7\")   Wt 79.4 kg (175 lb)   SpO2 97%   BMI 27.41 kg/m   Estimated body mass index is 27.41 kg/m  as calculated from the following:    Height as of this encounter: 1.702 m (5' 7\").    Weight as of this encounter: 79.4 kg (175 lb).  Medication Reconciliation: complete    Janel Ga LPN  "

## 2018-12-19 NOTE — PATIENT INSTRUCTIONS
We would like to see you back in 5 months. Please come 30 minutes prior for lab work.     When you are in need of a refill of your Tamoxifen, please call your pharmacy and they will send us a request.     If you have any questions please call [phone]    Other instructions:  Make and appointment for your yearly physical with Dr. Wren.

## 2019-01-15 NOTE — PATIENT INSTRUCTIONS
Preventive Health Recommendations:     See your health care provider every year to    Review health changes.     Discuss preventive care.      Review your medicines if your doctor has prescribed any.      Talk with your health care provider about whether you should have a test to screen for prostate cancer (PSA).    Every 3 years, have a diabetes test (fasting glucose). If you are at risk for diabetes, you should have this test more often.    Every 5 years, have a cholesterol test. Have this test more often if you are at risk for high cholesterol or heart disease.     Every 10 years, have a colonoscopy. Or, have a yearly FIT test (stool test). These exams will check for colon cancer.    Talk to with your health care provider about screening for Abdominal Aortic Aneurysm if you have a family history of AAA or have a history of smoking.    Shots:     Get a flu shot each year.     Get a tetanus shot every 10 years.     Talk to your doctor about your pneumonia vaccines. There are now two you should receive - Pneumovax (PPSV 23) and Prevnar (PCV 13).     Talk to your pharmacist about a shingles vaccine.     Talk to your doctor about the hepatitis B vaccine.  Nutrition:     Eat at least 5 servings of fruits and vegetables each day.     Eat whole-grain bread, whole-wheat pasta and brown rice instead of white grains and rice.     Get adequate Calcium and Vitamin D.   Lifestyle    Exercise for at least 150 minutes a week (30 minutes a day, 5 days a week). This will help you control your weight and prevent disease.     Limit alcohol to one drink per day.     No smoking.     Wear sunscreen to prevent skin cancer.    See your dentist every six months for an exam and cleaning.    See your eye doctor every 1 to 2 years to screen for conditions such as glaucoma, macular degeneration, cataracts, etc.    Personalized Prevention Plan  You are due for the preventive services outlined below.  Your care team is available to assist you  in scheduling these services.  If you have already completed any of these items, please share that information with your care team to update in your medical record.  Health Maintenance Due   Topic Date Due     Zoster (Chicken Pox) Vaccine (2 of 3) 02/03/2017     DALILA QUESTIONNAIRE 6 MONTHS  08/01/2018     URINE DRUG SCREEN Q1 YR  08/21/2018     Thyroid Function Lab (TSH) - yearly  02/09/2019     Cholesterol Lab - yearly  02/09/2019

## 2019-01-15 NOTE — PROGRESS NOTES
"  SUBJECTIVE:   Lio Daly is a 84 year old male who presents for Preventive Visit.      Are you in the first 12 months of your Medicare Part B coverage?  No    Physical Health:    In general, how would you rate your overall physical health? good    Outside of work, how many days during the week do you exercise? 4-5 days/week    Outside of work, approximately how many minutes a day do you exercise?15-30 minutes    If you drink alcohol do you typically have >3 drinks per day or >7 drinks per week? No    Do you usually eat at least 4 servings of fruit and vegetables a day, include whole grains & fiber and avoid regularly eating high fat or \"junk\" foods? Yes    Do you have any problems taking medications regularly?  No    Do you have any side effects from medications? none    Needs assistance for the following daily activities: no assistance needed    Which of the following safety concerns are present in your home?  none identified     Hearing impairment: Yes, Difficulty understanding soft or whispered speech.    In the past 6 months, have you been bothered by leaking of urine? n/a    Mental Health:    In general, how would you rate your overall mental or emotional health? good  PHQ-2 Score:      Do you feel safe in your environment? Yes    Do you have a Health Care Directive? No: Advance care planning was reviewed with patient; patient declined at this time.    Additional concerns to address?  No    Fall risk:  Fallen 2 or more times in the past year?: No  Any fall with injury in the past year?: No    Cognitive Screenin) Repeat 3 items (Leader, Season, Table)    2) Clock draw: NORMAL  3) 3 item recall: Recalls 1 object   Results: NORMAL clock, 1-2 items recalled: COGNITIVE IMPAIRMENT LESS LIKELY    Mini-CogTM Copyright CRISTIANA Cadena. Licensed by the author for use in [facility]; reprinted with permission (xi@.[facility]). All rights reserved.      Do you have sleep apnea, excessive snoring or " daytime drowsiness?: no          Reviewed and updated as needed this visit by clinical staff  Tobacco  Allergies  Meds  Med Hx  Surg Hx  Fam Hx  Soc Hx        Reviewed and updated as needed this visit by Provider        Social History     Tobacco Use     Smoking status: Former Smoker     Packs/day: 0.50     Years: 47.00     Pack years: 23.50     Types: Cigarettes     Start date: 1948     Last attempt to quit: 1995     Years since quittin.0     Smokeless tobacco: Never Used   Substance Use Topics     Alcohol use: No     Comment: former                           Current providers sharing in care for this patient include:   Patient Care Team:  Rubén Wren MD as PCP - General (Family Practice)    The following health maintenance items are reviewed in Epic and correct as of today:  Health Maintenance   Topic Date Due     ZOSTER IMMUNIZATION (2 of 3) 2017     DALILA QUESTIONNAIRE 6 MONTHS  2018     URINE DRUG SCREEN Q1 YR  2018     TSH W/ FREE T4 REFLEX Q1 YEAR  2019     LIPID MONITORING Q1 YEAR  2019     PHQ-9 Q6 MONTHS  2019     ALT Q1 YR  2019     FALL RISK ASSESSMENT  2019     ADVANCE DIRECTIVE PLANNING Q5 YRS  07/10/2022     DTAP/TDAP/TD IMMUNIZATION (3 - Td) 2026     INFLUENZA VACCINE  Completed     IPV IMMUNIZATION  Aged Out     MENINGITIS IMMUNIZATION  Aged Out     Labs reviewed in EPIC      ROS:  CONSTITUTIONAL: NEGATIVE for fever, chills, change in weight  INTEGUMENTARY/SKIN: NEGATIVE for worrisome rashes, moles or lesions  EYES: NEGATIVE for vision changes or irritation  ENT/MOUTH: NEGATIVE for ear, mouth and throat problems  RESP: NEGATIVE for significant cough or SOB  BREAST: NEGATIVE for masses, tenderness or discharge  CV: NEGATIVE for chest pain, palpitations or peripheral edema  GI: NEGATIVE for nausea, abdominal pain, heartburn, or change in bowel habits  : NEGATIVE for frequency, dysuria, or hematuria  MUSCULOSKELETAL:  "NEGATIVE for significant arthralgias or myalgia  NEURO: NEGATIVE for weakness, dizziness or paresthesias  ENDOCRINE: NEGATIVE for temperature intolerance, skin/hair changes  HEME: NEGATIVE for bleeding problems  PSYCHIATRIC: NEGATIVE for changes in mood or affect    OBJECTIVE:   /72   Pulse 69   Wt 76.2 kg (168 lb)   SpO2 97%   BMI 26.31 kg/m   Estimated body mass index is 26.31 kg/m  as calculated from the following:    Height as of 12/19/18: 1.702 m (5' 7\").    Weight as of this encounter: 76.2 kg (168 lb).  EXAM:   GENERAL: healthy, alert and no distress  EYES: Eyes grossly normal to inspection, PERRL and conjunctivae and sclerae normal  HENT: ear canals and TM's normal, nose and mouth without ulcers or lesions  NECK: no adenopathy, no asymmetry, masses, or scars and thyroid normal to palpation  RESP: lungs clear to auscultation - no rales, rhonchi or wheezes  CV: regular rate and rhythm, normal S1 S2, no S3 or S4, no murmur, click or rub, no peripheral edema and peripheral pulses strong  ABDOMEN: soft, nontender, no hepatosplenomegaly, no masses and bowel sounds normal  MS: no gross musculoskeletal defects noted, no edema  SKIN: no suspicious lesions or rashes  NEURO: Normal strength and tone, mentation intact and speech normal  PSYCH: mentation appears normal, affect normal/bright    Diagnostic Test Results:  none     ASSESSMENT / PLAN:       ICD-10-CM    1. Routine general medical examination at a health care facility Z00.00 Lipid Profile     TSH with free T4 reflex   2. HTN, goal below 140/80 I10 Comprehensive metabolic panel (BMP + Alb, Alk Phos, ALT, AST, Total. Bili, TP)   3. Anxiety F41.9        End of Life Planning:  Patient currently has an advanced directive: Yes.  Practitioner is supportive of decision.    COUNSELING:  Reviewed preventive health counseling, as reflected in patient instructions    BP Readings from Last 1 Encounters:   01/16/19 134/72     Estimated body mass index is 26.31 " "kg/m  as calculated from the following:    Height as of 12/19/18: 1.702 m (5' 7\").    Weight as of this encounter: 76.2 kg (168 lb).           reports that he quit smoking about 24 years ago. His smoking use included cigarettes. He started smoking about 70 years ago. He has a 23.50 pack-year smoking history. he has never used smokeless tobacco.      Appropriate preventive services were discussed with this patient, including applicable screening as appropriate for cardiovascular disease, diabetes, osteopenia/osteoporosis, and glaucoma.  As appropriate for age/gender, discussed screening for colorectal cancer, prostate cancer, breast cancer, and cervical cancer. Checklist reviewing preventive services available has been given to the patient.    Reviewed patients plan of care and provided an AVS. The Basic Care Plan (routine screening as documented in Health Maintenance) for Lio meets the Care Plan requirement. This Care Plan has been established and reviewed with the Patient.    Counseling Resources:  ATP IV Guidelines  Pooled Cohorts Equation Calculator  Breast Cancer Risk Calculator  FRAX Risk Assessment  ICSI Preventive Guidelines  Dietary Guidelines for Americans, 2010  USDA's MyPlate  ASA Prophylaxis  Lung CA Screening    Rubén Wren MD  [facility]  "

## 2019-01-16 ENCOUNTER — OFFICE VISIT (OUTPATIENT)
Dept: FAMILY MEDICINE | Facility: OTHER | Age: 84
End: 2019-01-16
Attending: FAMILY MEDICINE
Payer: COMMERCIAL

## 2019-01-16 VITALS
OXYGEN SATURATION: 97 % | BODY MASS INDEX: 26.31 KG/M2 | HEART RATE: 69 BPM | SYSTOLIC BLOOD PRESSURE: 134 MMHG | DIASTOLIC BLOOD PRESSURE: 72 MMHG | WEIGHT: 168 LBS

## 2019-01-16 DIAGNOSIS — I10 HTN, GOAL BELOW 140/80: ICD-10-CM

## 2019-01-16 DIAGNOSIS — Z00.00 ROUTINE GENERAL MEDICAL EXAMINATION AT A HEALTH CARE FACILITY: Primary | ICD-10-CM

## 2019-01-16 DIAGNOSIS — F41.9 ANXIETY: ICD-10-CM

## 2019-01-16 PROCEDURE — 36415 COLL VENOUS BLD VENIPUNCTURE: CPT | Mod: ZL | Performed by: FAMILY MEDICINE

## 2019-01-16 PROCEDURE — 84443 ASSAY THYROID STIM HORMONE: CPT | Mod: ZL | Performed by: FAMILY MEDICINE

## 2019-01-16 PROCEDURE — 80061 LIPID PANEL: CPT | Mod: ZL | Performed by: FAMILY MEDICINE

## 2019-01-16 PROCEDURE — 80053 COMPREHEN METABOLIC PANEL: CPT | Mod: ZL | Performed by: FAMILY MEDICINE

## 2019-01-16 PROCEDURE — 99397 PER PM REEVAL EST PAT 65+ YR: CPT | Performed by: FAMILY MEDICINE

## 2019-01-16 ASSESSMENT — ANXIETY QUESTIONNAIRES
GAD7 TOTAL SCORE: 1
2. NOT BEING ABLE TO STOP OR CONTROL WORRYING: NOT AT ALL
3. WORRYING TOO MUCH ABOUT DIFFERENT THINGS: SEVERAL DAYS
6. BECOMING EASILY ANNOYED OR IRRITABLE: NOT AT ALL
7. FEELING AFRAID AS IF SOMETHING AWFUL MIGHT HAPPEN: NOT AT ALL
5. BEING SO RESTLESS THAT IT IS HARD TO SIT STILL: NOT AT ALL
1. FEELING NERVOUS, ANXIOUS, OR ON EDGE: NOT AT ALL

## 2019-01-16 ASSESSMENT — PAIN SCALES - GENERAL: PAINLEVEL: NO PAIN (0)

## 2019-01-16 ASSESSMENT — PATIENT HEALTH QUESTIONNAIRE - PHQ9: 5. POOR APPETITE OR OVEREATING: NOT AT ALL

## 2019-01-16 NOTE — LETTER
January 17, 2019      Lio Daly  [address] [address]  [place] [address]        Dear ,    We are writing to inform you of your test results.    Your test results fall within the expected range(s) or remain unchanged from previous results.  Please continue with current treatment plan.    Resulted Orders   Lipid Profile   Result Value Ref Range    Cholesterol 148 <200 mg/dL    Triglycerides 104 <150 mg/dL    HDL Cholesterol 57 >39 mg/dL    LDL Cholesterol Calculated 70 <100 mg/dL      Comment:      Desirable:       <100 mg/dl    Non HDL Cholesterol 91 <130 mg/dL   TSH with free T4 reflex   Result Value Ref Range    TSH 1.86 0.40 - 4.00 mU/L   Comprehensive metabolic panel (BMP + Alb, Alk Phos, ALT, AST, Total. Bili, TP)   Result Value Ref Range    Sodium 140 133 - 144 mmol/L    Potassium 4.6 3.4 - 5.3 mmol/L    Chloride 106 94 - 109 mmol/L    Carbon Dioxide 26 20 - 32 mmol/L    Anion Gap 8 3 - 14 mmol/L    Glucose 87 70 - 99 mg/dL    Urea Nitrogen 20 7 - 30 mg/dL    Creatinine 1.04 0.66 - 1.25 mg/dL    GFR Estimate 65 >60 mL/min/[1.73_m2]      Comment:      Non  GFR Calc  Starting 12/18/2018, serum creatinine based estimated GFR (eGFR) will be   calculated using the Chronic Kidney Disease Epidemiology Collaboration   (CKD-EPI) equation.      GFR Estimate If Black 76 >60 mL/min/[1.73_m2]      Comment:       GFR Calc  Starting 12/18/2018, serum creatinine based estimated GFR (eGFR) will be   calculated using the Chronic Kidney Disease Epidemiology Collaboration   (CKD-EPI) equation.      Calcium 8.7 8.5 - 10.1 mg/dL    Bilirubin Total 0.4 0.2 - 1.3 mg/dL    Albumin 3.9 3.4 - 5.0 g/dL    Protein Total 7.2 6.8 - 8.8 g/dL    Alkaline Phosphatase 78 40 - 150 U/L    ALT 25 0 - 70 U/L    AST 23 0 - 45 U/L       If you have any questions or concerns, please call the clinic at the number listed above.       Sincerely,        Rubén Wren MD

## 2019-01-16 NOTE — NURSING NOTE
"Chief Complaint   Patient presents with     Physical       Initial /72   Pulse 69   Wt 76.2 kg (168 lb)   SpO2 97%   BMI 26.31 kg/m   Estimated body mass index is 26.31 kg/m  as calculated from the following:    Height as of 12/19/18: 1.702 m (5' 7\").    Weight as of this encounter: 76.2 kg (168 lb).  Medication Reconciliation: complete    Tonia Noriega LPN  "

## 2019-01-17 LAB
ALBUMIN SERPL-MCNC: 3.9 G/DL (ref 3.4–5)
ALP SERPL-CCNC: 78 U/L (ref 40–150)
ALT SERPL W P-5'-P-CCNC: 25 U/L (ref 0–70)
ANION GAP SERPL CALCULATED.3IONS-SCNC: 8 MMOL/L (ref 3–14)
AST SERPL W P-5'-P-CCNC: 23 U/L (ref 0–45)
BILIRUB SERPL-MCNC: 0.4 MG/DL (ref 0.2–1.3)
BUN SERPL-MCNC: 20 MG/DL (ref 7–30)
CALCIUM SERPL-MCNC: 8.7 MG/DL (ref 8.5–10.1)
CHLORIDE SERPL-SCNC: 106 MMOL/L (ref 94–109)
CHOLEST SERPL-MCNC: 148 MG/DL
CO2 SERPL-SCNC: 26 MMOL/L (ref 20–32)
CREAT SERPL-MCNC: 1.04 MG/DL (ref 0.66–1.25)
GFR SERPL CREATININE-BSD FRML MDRD: 65 ML/MIN/{1.73_M2}
GLUCOSE SERPL-MCNC: 87 MG/DL (ref 70–99)
HDLC SERPL-MCNC: 57 MG/DL
LDLC SERPL CALC-MCNC: 70 MG/DL
NONHDLC SERPL-MCNC: 91 MG/DL
POTASSIUM SERPL-SCNC: 4.6 MMOL/L (ref 3.4–5.3)
PROT SERPL-MCNC: 7.2 G/DL (ref 6.8–8.8)
SODIUM SERPL-SCNC: 140 MMOL/L (ref 133–144)
TRIGL SERPL-MCNC: 104 MG/DL
TSH SERPL DL<=0.005 MIU/L-ACNC: 1.86 MU/L (ref 0.4–4)

## 2019-01-17 ASSESSMENT — ANXIETY QUESTIONNAIRES: GAD7 TOTAL SCORE: 1

## 2019-01-31 DIAGNOSIS — M54.16 LUMBAR RADICULOPATHY: ICD-10-CM

## 2019-01-31 RX ORDER — GABAPENTIN 300 MG/1
CAPSULE ORAL
Qty: 180 CAPSULE | Refills: 0 | Status: SHIPPED | OUTPATIENT
Start: 2019-01-31 | End: 2019-04-30

## 2019-02-05 DIAGNOSIS — C50.911 PRIMARY BREAST MALIGNANCY, RIGHT (H): ICD-10-CM

## 2019-02-05 NOTE — TELEPHONE ENCOUNTER
tamoxifen (NOLVADEX) 20 MG tablet  Last Written Prescription Date:  8/3/18  Last Fill Quantity: 90,   # refills: 1  Last Office Visit: 1/16/19  Future Office visit:       Routing refill request to provider for review/approval because:  Drug not on the [facility], [facility] or [facility] refill protocol or controlled substance

## 2019-02-06 RX ORDER — TAMOXIFEN CITRATE 20 MG/1
20 TABLET ORAL DAILY
Qty: 90 TABLET | Refills: 1 | Status: SHIPPED | OUTPATIENT
Start: 2019-02-06 | End: 2019-07-26

## 2019-02-18 DIAGNOSIS — E03.9 HYPOTHYROIDISM: ICD-10-CM

## 2019-02-18 DIAGNOSIS — E78.5 HYPERLIPIDEMIA LDL GOAL <130: ICD-10-CM

## 2019-02-18 RX ORDER — PRAVASTATIN SODIUM 40 MG
TABLET ORAL
Qty: 135 TABLET | Refills: 2 | Status: SHIPPED | OUTPATIENT
Start: 2019-02-18 | End: 2019-12-30

## 2019-02-18 RX ORDER — LEVOTHYROXINE SODIUM 75 UG/1
TABLET ORAL
Qty: 90 TABLET | Refills: 2 | Status: SHIPPED | OUTPATIENT
Start: 2019-02-18 | End: 2019-12-02

## 2019-03-04 DIAGNOSIS — G47.00 PERSISTENT INSOMNIA: ICD-10-CM

## 2019-03-05 RX ORDER — ZOLPIDEM TARTRATE 5 MG/1
TABLET ORAL
Qty: 90 TABLET | Refills: 0 | Status: SHIPPED | OUTPATIENT
Start: 2019-03-05 | End: 2019-09-03

## 2019-03-05 NOTE — TELEPHONE ENCOUNTER
Not on protocol.  Please advise.    zolpidem (AMBIEN) 5 MG table      Last Written Prescription Date:  12/7/18  Last Fill Quantity: 90,   # refills: 0  Last Office Visit: 1/16/19  Future Office visit:    Next 5 appointments (look out 90 days)    May 24, 2019  2:30 PM CDT  (Arrive by 2:15 PM)  Return Visit with Radha Sales NP  [facility] ([facility] [address] [place] [address]  [phone]           Routing refill request to provider for review/approval because:  Drug not on the [facility], [facility] or [facility] refill protocol or controlled substance

## 2019-04-02 DIAGNOSIS — K21.9 GASTROESOPHAGEAL REFLUX DISEASE, ESOPHAGITIS PRESENCE NOT SPECIFIED: ICD-10-CM

## 2019-04-02 DIAGNOSIS — N40.0 BENIGN PROSTATIC HYPERPLASIA, UNSPECIFIED WHETHER LOWER URINARY TRACT SYMPTOMS PRESENT: ICD-10-CM

## 2019-04-02 DIAGNOSIS — I10 BENIGN ESSENTIAL HYPERTENSION: ICD-10-CM

## 2019-04-04 RX ORDER — ATENOLOL 50 MG/1
TABLET ORAL
Qty: 90 TABLET | Refills: 1 | Status: SHIPPED | OUTPATIENT
Start: 2019-04-04 | End: 2019-11-26

## 2019-04-04 RX ORDER — TAMSULOSIN HYDROCHLORIDE 0.4 MG/1
CAPSULE ORAL
Qty: 180 CAPSULE | Refills: 1 | Status: SHIPPED | OUTPATIENT
Start: 2019-04-04 | End: 2019-09-30

## 2019-04-30 DIAGNOSIS — M54.16 LUMBAR RADICULOPATHY: ICD-10-CM

## 2019-04-30 RX ORDER — GABAPENTIN 300 MG/1
CAPSULE ORAL
Qty: 180 CAPSULE | Refills: 0 | Status: SHIPPED | OUTPATIENT
Start: 2019-04-30 | End: 2019-07-26

## 2019-04-30 NOTE — TELEPHONE ENCOUNTER
gabapentin (NEURONTIN) 300 MG capsule      Last Written Prescription Date:  1-31-19  Last Fill Quantity: 180,   # refills: 0  Last Office Visit: 1-16-19  Future Office visit:    Next 5 appointments (look out 90 days)    May 24, 2019  2:30 PM CDT  (Arrive by 2:15 PM)  Return Visit with Radha Sales NP  [facility] [place] ([facility] ) [address] [place] [address]  [phone]           Routing refill request to provider for review/approval because:  Drug not on the [facility], [facility] or [facility] refill protocol or controlled substance

## 2019-05-23 NOTE — PROGRESS NOTES
Oncology Follow-up Visit:  May 24, 2019    Reason for Visit:  Patient presents with:  RECHECK: follow up malignant neoplasm of right male breast     Nursing Note and documentation reviewed: yes    HPI:  This is an 84-year-old male patient who presents to the oncology/hematology clinic today in follow up of right breast cancer.  Patient underwent a right mastectomy with axillary node dissection in July 2015. He was placed on 20 mg of tamoxifen and remains on this.       He presents to the clinic today with no new complaints.  He has had no difficulty with the tamoxifen and has no issues with night sweats or hot flashes.  He does continue to express how much he misses his wife.  He last saw his PCP in January 2019.    Oncologic History: Patient had presented with complaints of right nipple irritation over a six-month period and was noted to have a mass in the right breast. Mammogram was completed followed by biopsy and pathology showed an invasive carcinoma. On 7/27/2015, he underwent a right modified radical mastectomy with axillary node dissection by Dr. Garsia. 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 DCIS was present, grade 2 with expansive comedonecrosis. Node dissection revealed 1/17 nodes revealing micrometastasis. Tumor was ER/ID positive, HER-2/thomas negative. Patient was staged pathologic T1c N1mi M0.      At consultation, patient was not interested in adjuvant chemotherapy and was placed on tamoxifen 20 mg daily.      BRCA 2+      Current Chemo Regime/TX: Tamoxifen 20mg daily initiated 8/2015  Current Cycle: n/a  # of completed cycles: n/a      Previous treatment:  n/a    Past Medical History:   Diagnosis Date     Dysphagia 06/28/2004     HTN (hypertension)      Need for prophylactic vaccination and inoculation 12/10/2001     Need for prophylactic vaccination and inoculation, 11/06/2003      Other and unspecified hyperlipidemia 05/01/2001     Prediabetes      Shortness of breath 03/28/2006     Shoulder pain 06/15/2012     Unspecified essential hypertension 05/18/2000       Past Surgical History:   Procedure Laterality Date     CARDIAC SURGERY      angiogram 1992 [facility]     COLONOSCOPY      1998     GENITOURINARY SURGERY      prostate bx [place]     GENITOURINARY SURGERY      Prostate bx [facility]     GI SURGERY      EGD     GI SURGERY      Pt has EGD with removal of food from esophagus     MASTECTOMY SIMPLE, SENTINEL NODE, COMBINED Right 7/27/2015    Procedure: COMBINED MASTECTOMY SIMPLE, SENTINEL NODE;  Surgeon: Aliyah Garsia MD;  Location: [facility]     ORTHOPEDIC SURGERY      bilateral knee replacements       Family History   Problem Relation Age of Onset     Cancer - colorectal Mother      Eye Disorder Mother         cataracts     Musculoskeletal Disorder Mother         CTS     Colon Cancer Mother      Other Cancer Mother      Cancer Father         lung cancer     Genitourinary Problems Father         prostate problems     Other Cancer Father         lung     C.A.D. Brother         CABG     Eye Disorder Brother         cataracts     Breast Cancer Other      Ovarian Cancer Other      Pancreatic Cancer Other      Diabetes No family hx of      Coronary Artery Disease No family hx of      Cerebrovascular Disease No family hx of      Hyperlipidemia No family hx of      Hypertension No family hx of      Prostate Cancer No family hx of      Thyroid Disease No family hx of      Genetic Disorder No family hx of      Asthma No family hx of      Anesthesia Reaction No family hx of        Social History     Socioeconomic History     Marital status:      Spouse name: Marisa     Number of children: Not on file     Years of education: Not on file     Highest education level: Not on file   Occupational History     Not on file   Social Needs     Financial resource strain: Not on file     Food insecurity:      Worry: Not on file     Inability: Not on file     Transportation needs:     Medical: Not on file     Non-medical: Not on file   Tobacco Use     Smoking status: Former Smoker     Packs/day: 0.50     Years: 47.00     Pack years: 23.50     Types: Cigarettes     Start date: 1948     Last attempt to quit: 1995     Years since quittin.3     Smokeless tobacco: Never Used   Substance and Sexual Activity     Alcohol use: No     Comment: former     Drug use: No     Sexual activity: Not on file   Lifestyle     Physical activity:     Days per week: Not on file     Minutes per session: Not on file     Stress: Not on file   Relationships     Social connections:     Talks on phone: Not on file     Gets together: Not on file     Attends Taoism service: Not on file     Active member of club or organization: Not on file     Attends meetings of clubs or organizations: Not on file     Relationship status: Not on file     Intimate partner violence:     Fear of current or ex partner: Not on file     Emotionally abused: Not on file     Physically abused: Not on file     Forced sexual activity: Not on file   Other Topics Concern      Service Not Asked     Blood Transfusions Not Asked     Caffeine Concern Yes     Comment: coffee, 2 cups daily     Occupational Exposure Not Asked     Hobby Hazards Not Asked     Sleep Concern Not Asked     Stress Concern Not Asked     Weight Concern Not Asked     Special Diet Not Asked     Back Care Not Asked     Exercise Not Asked     Bike Helmet Not Asked     Seat Belt Not Asked     Self-Exams Not Asked     Parent/sibling w/ CABG, MI or angioplasty before 65F 55M? No   Social History Narrative     Not on file       Current Outpatient Medications   Medication     acetaminophen (TYLENOL) 325 MG tablet     ALPRAZolam (XANAX) 0.5 MG tablet     Ascorbic Acid (VITAMIN C PO)     atenolol (TENORMIN) 50 MG tablet     gabapentin (NEURONTIN) 300 MG capsule     levothyroxine  "(SYNTHROID/LEVOTHROID) 75 MCG tablet     MELATONIN PO     omeprazole (PRILOSEC) 20 MG DR capsule     order for DME     pravastatin (PRAVACHOL) 40 MG tablet     sennosides (SENOKOT) 8.6 MG tablet     tamoxifen (NOLVADEX) 20 MG tablet     tamsulosin (FLOMAX) 0.4 MG capsule     triamcinolone (KENALOG) 0.5 % external cream     VENTOLIN  (90 BASE) MCG/ACT inhaler     zolpidem (AMBIEN) 5 MG tablet     No current facility-administered medications for this visit.         No Known Allergies    Review Of Systems:  Constitutional:    denies fever, weight changes, chills, and night sweats.  Eyes:    denies blurred or double vision  Ears/Nose/Throat:   denies ear pain, nose problems, difficulty swallowing  Respiratory:   denies shortness of breath, cough   Skin:   denies rash, lesions  Breast/Chest wall:   denies pain, lumps or discharge  Cardiovascular:   denies chest pain, palpitations  Gastrointestinal:   denies abdominal pain, bloating, nausea, early satiety; no change in stool habits or blood in his stool  Genitourinary:   denies difficulty with urination, blood in urine  Musculoskeletal:    denies new muscle pain, bone pain  Neurologic:   denies lightheadedness, headaches, numbness or tingling  Psychiatric:   denies anxiety, depression  Hematologic/Lymphatic/Immunologic:   denies easy bruising, easy bleeding, lumps or bumps noted  Endocrine:   Denies increased thirst      Physical Exam:  /64   Pulse 71   Temp 96.9  F (36.1  C) (Tympanic)   Ht 1.702 m (5' 7\")   Wt 79.4 kg (175 lb)   SpO2 96%   BMI 27.41 kg/m      GENERAL APPEARANCE: Healthy, alert and in no acute distress.  HEENT: Normocephalic, Sclerae anicteric. Oropharynx without ulcers, lesions, or thrush.  NECK:   No asymmetry or masses, no thyromegaly.  LYMPHATICS: No palpable cervical, supraclavicular, axillary, or inguinal nodes   RESP: Lungs with a few scattered rales and expiratory wheezes faint to lower lobes bilaterally, respirations regular " and easy   respirations regular and easy  CARDIOVASCULAR: Regular rate and rhythm. Normal S1, S2  ABDOMEN: Soft, nontender. Bowel sounds auscultated all 4 quadrants. No palpable organomegaly or masses.  BREAST/Chest wall: no lumps, masses or tenderness bilaterally  MUSCULOSKELETAL: Extremities without gross deformities noted. No edema of bilateral lower extremities.  Edema remains to the right upper extremity  NEURO: Alert and oriented x 3.  Gait steady with cane.  PSYCHIATRIC: Mentation and affect appear normal.  Mood appropriate.    Laboratory:  Results for orders placed or performed in visit on 05/24/19   Comprehensive metabolic panel   Result Value Ref Range    Sodium 140 133 - 144 mmol/L    Potassium 4.5 3.4 - 5.3 mmol/L    Chloride 108 94 - 109 mmol/L    Carbon Dioxide 26 20 - 32 mmol/L    Anion Gap 6 3 - 14 mmol/L    Glucose 100 (H) 70 - 99 mg/dL    Urea Nitrogen 25 7 - 30 mg/dL    Creatinine 1.15 0.66 - 1.25 mg/dL    GFR Estimate 58 (L) >60 mL/min/[1.73_m2]    GFR Estimate If Black 67 >60 mL/min/[1.73_m2]    Calcium 9.0 8.5 - 10.1 mg/dL    Bilirubin Total 0.4 0.2 - 1.3 mg/dL    Albumin 3.9 3.4 - 5.0 g/dL    Protein Total 7.7 6.8 - 8.8 g/dL    Alkaline Phosphatase 81 40 - 150 U/L    ALT 22 0 - 70 U/L    AST 24 0 - 45 U/L   CBC with platelets differential   Result Value Ref Range    WBC 8.1 4.0 - 11.0 10e9/L    RBC Count 3.93 (L) 4.4 - 5.9 10e12/L    Hemoglobin 11.8 (L) 13.3 - 17.7 g/dL    Hematocrit 36.5 (L) 40.0 - 53.0 %    MCV 93 78 - 100 fl    MCH 30.0 26.5 - 33.0 pg    MCHC 32.3 31.5 - 36.5 g/dL    RDW 14.7 10.0 - 15.0 %    Platelet Count 251 150 - 450 10e9/L    Diff Method Automated Method     % Neutrophils 53.2 %    % Lymphocytes 32.3 %    % Monocytes 8.3 %    % Eosinophils 5.1 %    % Basophils 0.9 %    % Immature Granulocytes 0.2 %    Nucleated RBCs 0 0 /100    Absolute Neutrophil 4.3 1.6 - 8.3 10e9/L    Absolute Lymphocytes 2.6 0.8 - 5.3 10e9/L    Absolute Monocytes 0.7 0.0 - 1.3 10e9/L    Absolute  Eosinophils 0.4 0.0 - 0.7 10e9/L    Absolute Basophils 0.1 0.0 - 0.2 10e9/L    Abs Immature Granulocytes 0.0 0 - 0.4 10e9/L    Absolute Nucleated RBC 0.0        Imaging Studies: None completed for today's visit      ASSESSMENT/PLAN:    #1  Breast cancer:  Diagnosed with Stage 1, pT1c N1mi M0 carcinoma of the right breast in July 2015.  He underwent mastectomy with node dissection with 1/17 nodes revealing micrometastases; tumor was 2cm, ER/NC positive, HER-2/thomas negative.  BRCA2 positive.  He'll continue with tamoxifen 20 mg daily and follow up in 6 months with a CBC, CMP.    #2 adventitious lung sounds: We did discuss a CT scan of the chest to further evaluate and patient declines.  He was instructed to call with any change in his respiratory status to include fevers, shortness of breath or cough and he verbalized understanding.    I encouraged patient to call with any questions or concerns.      Radha GONSALVES, FNP-BC, AOCNP

## 2019-05-24 ENCOUNTER — ONCOLOGY VISIT (OUTPATIENT)
Dept: ONCOLOGY | Facility: OTHER | Age: 84
End: 2019-05-24
Attending: NURSE PRACTITIONER
Payer: MEDICARE

## 2019-05-24 VITALS
HEIGHT: 67 IN | OXYGEN SATURATION: 96 % | RESPIRATION RATE: 16 BRPM | DIASTOLIC BLOOD PRESSURE: 64 MMHG | BODY MASS INDEX: 27.47 KG/M2 | HEART RATE: 71 BPM | TEMPERATURE: 96.9 F | SYSTOLIC BLOOD PRESSURE: 106 MMHG | WEIGHT: 175 LBS

## 2019-05-24 DIAGNOSIS — C50.921 MALIGNANT NEOPLASM OF RIGHT MALE BREAST, UNSPECIFIED ESTROGEN RECEPTOR STATUS, UNSPECIFIED SITE OF BREAST (H): Primary | ICD-10-CM

## 2019-05-24 DIAGNOSIS — R09.89 ABNORMAL LUNG SOUNDS: ICD-10-CM

## 2019-05-24 DIAGNOSIS — C50.921 MALIGNANT NEOPLASM OF RIGHT MALE BREAST, UNSPECIFIED ESTROGEN RECEPTOR STATUS, UNSPECIFIED SITE OF BREAST (H): ICD-10-CM

## 2019-05-24 LAB
ALBUMIN SERPL-MCNC: 3.9 G/DL (ref 3.4–5)
ALP SERPL-CCNC: 81 U/L (ref 40–150)
ALT SERPL W P-5'-P-CCNC: 22 U/L (ref 0–70)
ANION GAP SERPL CALCULATED.3IONS-SCNC: 6 MMOL/L (ref 3–14)
AST SERPL W P-5'-P-CCNC: 24 U/L (ref 0–45)
BASOPHILS # BLD AUTO: 0.1 10E9/L (ref 0–0.2)
BASOPHILS NFR BLD AUTO: 0.9 %
BILIRUB SERPL-MCNC: 0.4 MG/DL (ref 0.2–1.3)
BUN SERPL-MCNC: 25 MG/DL (ref 7–30)
CALCIUM SERPL-MCNC: 9 MG/DL (ref 8.5–10.1)
CHLORIDE SERPL-SCNC: 108 MMOL/L (ref 94–109)
CO2 SERPL-SCNC: 26 MMOL/L (ref 20–32)
CREAT SERPL-MCNC: 1.15 MG/DL (ref 0.66–1.25)
DIFFERENTIAL METHOD BLD: ABNORMAL
EOSINOPHIL # BLD AUTO: 0.4 10E9/L (ref 0–0.7)
EOSINOPHIL NFR BLD AUTO: 5.1 %
ERYTHROCYTE [DISTWIDTH] IN BLOOD BY AUTOMATED COUNT: 14.7 % (ref 10–15)
GFR SERPL CREATININE-BSD FRML MDRD: 58 ML/MIN/{1.73_M2}
GLUCOSE SERPL-MCNC: 100 MG/DL (ref 70–99)
HCT VFR BLD AUTO: 36.5 % (ref 40–53)
HGB BLD-MCNC: 11.8 G/DL (ref 13.3–17.7)
IMM GRANULOCYTES # BLD: 0 10E9/L (ref 0–0.4)
IMM GRANULOCYTES NFR BLD: 0.2 %
LYMPHOCYTES # BLD AUTO: 2.6 10E9/L (ref 0.8–5.3)
LYMPHOCYTES NFR BLD AUTO: 32.3 %
MCH RBC QN AUTO: 30 PG (ref 26.5–33)
MCHC RBC AUTO-ENTMCNC: 32.3 G/DL (ref 31.5–36.5)
MCV RBC AUTO: 93 FL (ref 78–100)
MONOCYTES # BLD AUTO: 0.7 10E9/L (ref 0–1.3)
MONOCYTES NFR BLD AUTO: 8.3 %
NEUTROPHILS # BLD AUTO: 4.3 10E9/L (ref 1.6–8.3)
NEUTROPHILS NFR BLD AUTO: 53.2 %
NRBC # BLD AUTO: 0 10*3/UL
NRBC BLD AUTO-RTO: 0 /100
PLATELET # BLD AUTO: 251 10E9/L (ref 150–450)
POTASSIUM SERPL-SCNC: 4.5 MMOL/L (ref 3.4–5.3)
PROT SERPL-MCNC: 7.7 G/DL (ref 6.8–8.8)
RBC # BLD AUTO: 3.93 10E12/L (ref 4.4–5.9)
SODIUM SERPL-SCNC: 140 MMOL/L (ref 133–144)
WBC # BLD AUTO: 8.1 10E9/L (ref 4–11)

## 2019-05-24 PROCEDURE — 99213 OFFICE O/P EST LOW 20 MIN: CPT | Performed by: NURSE PRACTITIONER

## 2019-05-24 PROCEDURE — 36415 COLL VENOUS BLD VENIPUNCTURE: CPT | Mod: ZL | Performed by: NURSE PRACTITIONER

## 2019-05-24 PROCEDURE — 80053 COMPREHEN METABOLIC PANEL: CPT | Mod: ZL | Performed by: NURSE PRACTITIONER

## 2019-05-24 PROCEDURE — G0463 HOSPITAL OUTPT CLINIC VISIT: HCPCS

## 2019-05-24 PROCEDURE — 85025 COMPLETE CBC W/AUTO DIFF WBC: CPT | Mod: ZL | Performed by: NURSE PRACTITIONER

## 2019-05-24 RX ORDER — ACETAMINOPHEN 325 MG/1
325-650 TABLET ORAL EVERY 6 HOURS PRN
COMMUNITY

## 2019-05-24 ASSESSMENT — MIFFLIN-ST. JEOR: SCORE: 1442.42

## 2019-05-24 ASSESSMENT — PATIENT HEALTH QUESTIONNAIRE - PHQ9: SUM OF ALL RESPONSES TO PHQ QUESTIONS 1-9: 1

## 2019-05-24 ASSESSMENT — PAIN SCALES - GENERAL: PAINLEVEL: NO PAIN (0)

## 2019-05-24 NOTE — NURSING NOTE
"Chief Complaint   Patient presents with     RECHECK     follow up malignant neoplasm of right male breast       Initial /64   Pulse 71   Temp 96.9  F (36.1  C) (Tympanic)   Ht 1.702 m (5' 7\")   Wt 79.4 kg (175 lb)   SpO2 96%   BMI 27.41 kg/m   Estimated body mass index is 27.41 kg/m  as calculated from the following:    Height as of this encounter: 1.702 m (5' 7\").    Weight as of this encounter: 79.4 kg (175 lb).  Medication Reconciliation: complete   Immunizations reviewed, [facility]- has info, pain = 0, PHQ-9 = 1    Domonique Benavidez LPN  "

## 2019-05-24 NOTE — PATIENT INSTRUCTIONS
We would like to see you back in 6 months. Please come 30 minutes prior for lab work.    When you are in need of a refill of your Tamoxifen, please call your pharmacy and they will send us a request.     If you have any questions please call [phone]    Other instructions:  none

## 2019-06-03 DIAGNOSIS — F41.9 ANXIETY: ICD-10-CM

## 2019-06-04 RX ORDER — ALPRAZOLAM 0.5 MG
TABLET ORAL
Qty: 180 TABLET | Refills: 0 | Status: SHIPPED | OUTPATIENT
Start: 2019-06-04 | End: 2019-09-03

## 2019-06-04 NOTE — TELEPHONE ENCOUNTER
PCP is Dr. Wren.  Medication is pended if you approve.  Thank you.    Xanax      Last Written Prescription Date:  12/7/18  Last Fill Quantity: 180,   # refills: 0  Last Office Visit: 1/16/19  Future Office visit:       Routing refill request to provider for review/approval because:  Drug not on the [facility], [facility] or [facility] refill protocol or controlled substance

## 2019-07-26 DIAGNOSIS — M54.16 LUMBAR RADICULOPATHY: ICD-10-CM

## 2019-07-26 DIAGNOSIS — C50.911 PRIMARY BREAST MALIGNANCY, RIGHT (H): ICD-10-CM

## 2019-07-26 RX ORDER — TAMOXIFEN CITRATE 20 MG/1
TABLET ORAL
Qty: 90 TABLET | Refills: 0 | Status: SHIPPED | OUTPATIENT
Start: 2019-07-26 | End: 2019-10-23

## 2019-07-26 RX ORDER — GABAPENTIN 300 MG/1
CAPSULE ORAL
Qty: 180 CAPSULE | Refills: 0 | Status: SHIPPED | OUTPATIENT
Start: 2019-07-26 | End: 2019-10-23

## 2019-07-26 NOTE — TELEPHONE ENCOUNTER
gabapentin  Last Written Prescription Date: 4/30/19  Last Fill Quantity: 180 # of Refills: 0  Last Office Visit: 1/16/19    tamoxifen  Last Written Prescription Date: 2/6/19  Last Fill Quantity: 90 # of Refills: 1  Last Office Visit: 1/16/19

## 2019-09-03 DIAGNOSIS — F41.9 ANXIETY: ICD-10-CM

## 2019-09-03 DIAGNOSIS — G47.00 PERSISTENT INSOMNIA: ICD-10-CM

## 2019-09-04 RX ORDER — ZOLPIDEM TARTRATE 5 MG/1
TABLET ORAL
Qty: 90 TABLET | Refills: 0 | Status: SHIPPED | OUTPATIENT
Start: 2019-09-04 | End: 2019-12-02

## 2019-09-04 RX ORDER — ALPRAZOLAM 0.5 MG
TABLET ORAL
Qty: 180 TABLET | Refills: 0 | Status: SHIPPED | OUTPATIENT
Start: 2019-09-04 | End: 2019-12-02

## 2019-09-04 NOTE — TELEPHONE ENCOUNTER
ALPRAZolam (XANAX) 0.5 MG tablet      Last Written Prescription Date:  6-4-19  Last Fill Quantity: 180,   # refills: 0  Last Office Visit: 1-16-19  Future Office visit:    Next 5 appointments (look out 90 days)    Nov 26, 2019  2:30 PM CST  (Arrive by 2:15 PM)  Return Visit with Radha Sales NP  [facility] [place] ([facility] ) [address] [place] [address]  [phone]           Routing refill request to provider for review/approval because:  Drug not on the [facility], [facility] or  [facility] refill protocol or controlled substance

## 2019-09-04 NOTE — TELEPHONE ENCOUNTER
zolpidem (AMBIEN) 5 MG tablet      Last Written Prescription Date:  3-5-19  Last Fill Quantity: 90,   # refills: 0  Last Office Visit: 1-16-19  Future Office visit:    Next 5 appointments (look out 90 days)    Nov 26, 2019  2:30 PM CST  (Arrive by 2:15 PM)  Return Visit with Radha Sales NP  [facility] [place] ([facility] ) [address]  [place] [address]  [phone]           Routing refill request to provider for review/approval because:  Drug not on the [facility], [facility] or [facility] refill protocol or controlled substance

## 2019-09-30 DIAGNOSIS — K21.9 GASTROESOPHAGEAL REFLUX DISEASE, ESOPHAGITIS PRESENCE NOT SPECIFIED: ICD-10-CM

## 2019-09-30 DIAGNOSIS — I10 HTN, GOAL BELOW 140/80: Primary | ICD-10-CM

## 2019-09-30 DIAGNOSIS — N40.0 BENIGN PROSTATIC HYPERPLASIA, UNSPECIFIED WHETHER LOWER URINARY TRACT SYMPTOMS PRESENT: ICD-10-CM

## 2019-10-01 NOTE — TELEPHONE ENCOUNTER
LOV 1/16/19    Atenolol 4/4/19 #90 with 1  Omeprazole 4/4/19 #90 with 1  tamsulosin 4/4/19 #90 with 1

## 2019-10-02 RX ORDER — ATENOLOL 50 MG/1
TABLET ORAL
Qty: 90 TABLET | Refills: 0 | Status: SHIPPED | OUTPATIENT
Start: 2019-10-02 | End: 2019-12-30

## 2019-10-02 RX ORDER — TAMSULOSIN HYDROCHLORIDE 0.4 MG/1
CAPSULE ORAL
Qty: 180 CAPSULE | Refills: 0 | Status: SHIPPED | OUTPATIENT
Start: 2019-10-02 | End: 2019-12-30

## 2019-10-09 ENCOUNTER — IMMUNIZATION (OUTPATIENT)
Dept: FAMILY MEDICINE | Facility: OTHER | Age: 84
End: 2019-10-09
Attending: FAMILY MEDICINE
Payer: MEDICARE

## 2019-10-09 DIAGNOSIS — Z23 NEED FOR PROPHYLACTIC VACCINATION AND INOCULATION AGAINST INFLUENZA: Primary | ICD-10-CM

## 2019-10-09 PROCEDURE — 90662 IIV NO PRSV INCREASED AG IM: CPT

## 2019-10-09 PROCEDURE — G0008 ADMIN INFLUENZA VIRUS VAC: HCPCS

## 2019-10-23 DIAGNOSIS — M54.16 LUMBAR RADICULOPATHY: ICD-10-CM

## 2019-10-23 DIAGNOSIS — C50.911 PRIMARY BREAST MALIGNANCY, RIGHT (H): ICD-10-CM

## 2019-10-25 RX ORDER — GABAPENTIN 300 MG/1
CAPSULE ORAL
Qty: 180 CAPSULE | Refills: 0 | Status: SHIPPED | OUTPATIENT
Start: 2019-10-25 | End: 2020-01-29

## 2019-10-25 RX ORDER — TAMOXIFEN CITRATE 20 MG/1
TABLET ORAL
Qty: 90 TABLET | Refills: 0 | Status: SHIPPED | OUTPATIENT
Start: 2019-10-25 | End: 2020-01-29

## 2019-10-25 NOTE — TELEPHONE ENCOUNTER
nolvadex  Last Written Prescription Date:  7/26/19  Last Fill Quantity: 90,   # refills: 0  Last Office Visit: 1/16/19  Future Office visit:    Next 5 appointments (look out 90 days)    Nov 26, 2019  2:15 PM CST  (Arrive by 2:00 PM)  Return Visit with Radha Sales NP  [facility] ([facility] [place] ) [address] [place] [address]  [phone]           Routing refill request to provider for review/approval because:  Drug not on the [facility], [facility] or [facility] refill protocol or controlled substance      Gabapentin    Last Written Prescription Date:  7/26/19  Last Fill Quantity: 180,   # refills: 0  Last Office Visit: 1/16/19  Future Office visit:    Next 5 appointments (look out 90 days)    Nov 26, 2019  2:15 PM CST  (Arrive by 2:00 PM)  [facility] ([facility] ) [address]  [place] [address]  [phone]           Routing refill request to provider for review/approval because:  Not on protocol

## 2019-11-25 NOTE — PROGRESS NOTES
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  Patient underwent a right mastectomy with axillary node dissection in July 2015. He was placed on 20 mg of tamoxifen and remains on this.         He presents to the clinic today stating he is doing well and feeling good.  He offers no new complaints today.  He did receive an injection in his shoulder a couple days ago from his PCP which he states helped and he no longer has any issues with any pain.  He will be leaving in January to go to [place] for about 2 weeks to spend some time with his daughter.  Denies any issues with the tamoxifen and has no hot flashes.  He does continue with lymphedema to the right upper extremity and states he will use a glove and sleeve as needed.    Oncologic History:     Patient had presented with complaints of right nipple irritation over a six-month period and was noted to have a mass in the right breast. Mammogram was completed followed by biopsy and pathology showed an invasive carcinoma. On 7/27/2015, he underwent a right modified radical mastectomy with axillary node dissection by Dr. Garsia. 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 DCIS was present, grade 2 with expansive comedonecrosis. Node dissection revealed 1/17 nodes revealing micrometastasis. Tumor was ER/NH positive, HER-2/thomas negative. Patient was staged pathologic T1c N1mi M0.      At consultation, patient was not interested in adjuvant chemotherapy and was placed on tamoxifen 20 mg daily.      BRCA 2+      Current Chemo Regime/TX: Tamoxifen 20mg daily initiated  8/2015  Current Cycle: n/a  # of completed cycles: n/a      Previous treatment:  n/a    Past Medical History:   Diagnosis Date     Dysphagia 06/28/2004     HTN (hypertension)      Need for prophylactic vaccination and inoculation 12/10/2001     Need for prophylactic vaccination and inoculation, 11/06/2003     Other and unspecified hyperlipidemia 05/01/2001     Prediabetes      Shortness of breath 03/28/2006     Shoulder pain 06/15/2012     Unspecified essential hypertension 05/18/2000       Past Surgical History:   Procedure Laterality Date     CARDIAC SURGERY      angiogram 1992 [facility]     COLONOSCOPY      1998     GENITOURINARY SURGERY      prostate bx [place]     GENITOURINARY SURGERY      Prostate bx [facility]     GI SURGERY      EGD     GI SURGERY      Pt has EGD with removal of food from esophagus     MASTECTOMY SIMPLE, SENTINEL NODE, COMBINED Right 7/27/2015    Procedure: COMBINED MASTECTOMY SIMPLE, SENTINEL NODE;  Surgeon: Aliyah Garsia MD;  Location: [facility]     ORTHOPEDIC SURGERY      bilateral knee replacements       Family History   Problem Relation Age of Onset     Cancer - colorectal Mother      Eye Disorder Mother         cataracts     Musculoskeletal Disorder Mother         CTS     Colon Cancer Mother      Other Cancer Mother      Cancer Father         lung cancer     Genitourinary Problems Father         prostate problems     Other Cancer Father         lung     C.A.D. Brother         CABG     Eye Disorder Brother         cataracts     Breast Cancer Other      Ovarian Cancer Other      Pancreatic Cancer Other      Diabetes No family hx of      Coronary Artery Disease No family hx of      Cerebrovascular Disease No family hx of      Hyperlipidemia No family hx of      Hypertension No family hx of      Prostate Cancer No family hx of      Thyroid Disease No family hx of      Genetic Disorder No family hx of      Asthma No family hx of      Anesthesia Reaction No family hx of        Social History      Socioeconomic History     Marital status:      Spouse name: Marisa     Number of children: Not on file     Years of education: Not on file     Highest education level: Not on file   Occupational History     Not on file   Social Needs     Financial resource strain: Not on file     Food insecurity:     Worry: Not on file     Inability: Not on file     Transportation needs:     Medical: Not on file     Non-medical: Not on file   Tobacco Use     Smoking status: Former Smoker     Packs/day: 0.50     Years: 47.00     Pack years: 23.50     Types: Cigarettes     Start date: 1948     Last attempt to quit: 1995     Years since quittin.8     Smokeless tobacco: Never Used   Substance and Sexual Activity     Alcohol use: No     Comment: former     Drug use: No     Sexual activity: Not on file   Lifestyle     Physical activity:     Days per week: Not on file     Minutes per session: Not on file     Stress: Not on file   Relationships     Social connections:     Talks on phone: Not on file     Gets together: Not on file     Attends [employer]ism service: Not on file     Active member of club or organization: Not on file     Attends meetings of clubs or organizations: Not on file     Relationship status: Not on file     Intimate partner violence:     Fear of current or ex partner: Not on file     Emotionally abused: Not on file     Physically abused: Not on file     Forced sexual activity: Not on file   Other Topics Concern      Service Not Asked     Blood Transfusions Not Asked     Caffeine Concern Yes     Comment: coffee, 2 cups daily     Occupational Exposure Not Asked     Hobby Hazards Not Asked     Sleep Concern Not Asked     Stress Concern Not Asked     Weight Concern Not Asked     Special Diet Not Asked     Back Care Not Asked     Exercise Not Asked     Bike Helmet Not Asked     Seat Belt Not Asked     Self-Exams Not Asked     Parent/sibling w/ CABG, MI or angioplasty before 65F 55M? No  "  Social History Narrative     Not on file       Current Outpatient Medications   Medication     acetaminophen (TYLENOL) 325 MG tablet     ALPRAZolam (XANAX) 0.5 MG tablet     Ascorbic Acid (VITAMIN C PO)     atenolol (TENORMIN) 50 MG tablet     gabapentin (NEURONTIN) 300 MG capsule     levothyroxine (SYNTHROID/LEVOTHROID) 75 MCG tablet     omeprazole (PRILOSEC) 20 MG DR capsule     order for DME     pravastatin (PRAVACHOL) 40 MG tablet     sennosides (SENOKOT) 8.6 MG tablet     tamoxifen (NOLVADEX) 20 MG tablet     tamsulosin (FLOMAX) 0.4 MG capsule     zolpidem (AMBIEN) 5 MG tablet     triamcinolone (KENALOG) 0.5 % external cream     VENTOLIN  (90 BASE) MCG/ACT inhaler     No current facility-administered medications for this visit.         No Known Allergies    Review Of Systems:  Constitutional:    denies fever, weight changes, chills, and night sweats.  Eyes:    denies blurred or double vision  Ears/Nose/Throat:   denies ear pain, nose problems, difficulty swallowing  Respiratory:   denies shortness of breath, cough   Skin:   denies rash, lesions  Breast/Chest wall:   denies pain, lumps   Cardiovascular:   denies chest pain, palpitations  Gastrointestinal:   denies abdominal pain, bloating, nausea, early satiety; no change in bowel habits or blood in stool  Genitourinary:   denies difficulty with urination, blood in urine  Musculoskeletal:    denies new muscle pain, bone pain  Neurologic:   denies lightheadedness, headaches, numbness or tingling  Psychiatric:   denies anxiety, depression-doing wonderful  Hematologic/Lymphatic/Immunologic:   denies easy bruising, easy bleeding, lumps or bumps noted  Endocrine:   Denies increased thirst      Physical Exam:  /80   Pulse 82   Temp 98.9  F (37.2  C) (Tympanic)   Resp 20   Ht 1.702 m (5' 7\")   Wt 79.2 kg (174 lb 9.7 oz)   SpO2 97%   BMI 27.35 kg/m      GENERAL APPEARANCE: Healthy, alert and in no acute distress.  HEENT: Normocephalic, Sclerae " anicteric. Oropharynx without ulcers, lesions, or thrush.  NECK:   No asymmetry or masses, no thyromegaly.  LYMPHATICS: No palpable cervical, supraclavicular, axillary, or inguinal nodes   RESP: Lungs with faint expiratory wheeze throughout; respirations regular and easy  CARDIOVASCULAR: Regular rate and rhythm. Normal S1, S2  ABDOMEN: Soft, nontender. Bowel sounds auscultated all 4 quadrants. No palpable organomegaly or masses.  BREAST/Chest wall: no lumps, masses or tenderness bilaterally  MUSCULOSKELETAL: Lymphedema to right upper extremity essentially unchanged  NEURO: Alert and oriented x 3.  Gait steady.  PSYCHIATRIC: Mentation and affect appear normal.  Mood appropriate.    Laboratory:  Results for orders placed or performed in visit on 11/26/19   Comprehensive metabolic panel     Status: Abnormal   Result Value Ref Range    Sodium 139 133 - 144 mmol/L    Potassium 4.4 3.4 - 5.3 mmol/L    Chloride 108 94 - 109 mmol/L    Carbon Dioxide 25 20 - 32 mmol/L    Anion Gap 6 3 - 14 mmol/L    Glucose 125 (H) 70 - 99 mg/dL    Urea Nitrogen 19 7 - 30 mg/dL    Creatinine 1.01 0.66 - 1.25 mg/dL    GFR Estimate 67 >60 mL/min/[1.73_m2]    GFR Estimate If Black 78 >60 mL/min/[1.73_m2]    Calcium 8.9 8.5 - 10.1 mg/dL    Bilirubin Total 0.2 0.2 - 1.3 mg/dL    Albumin 3.8 3.4 - 5.0 g/dL    Protein Total 7.5 6.8 - 8.8 g/dL    Alkaline Phosphatase 86 40 - 150 U/L    ALT 25 0 - 70 U/L    AST 24 0 - 45 U/L   CBC with platelets differential     Status: Abnormal   Result Value Ref Range    WBC 7.6 4.0 - 11.0 10e9/L    RBC Count 3.90 (L) 4.4 - 5.9 10e12/L    Hemoglobin 11.6 (L) 13.3 - 17.7 g/dL    Hematocrit 35.5 (L) 40.0 - 53.0 %    MCV 91 78 - 100 fl    MCH 29.7 26.5 - 33.0 pg    MCHC 32.7 31.5 - 36.5 g/dL    RDW 14.8 10.0 - 15.0 %    Platelet Count 268 150 - 450 10e9/L    Diff Method Automated Method     % Neutrophils 78.5 %    % Lymphocytes 15.7 %    % Monocytes 2.9 %    % Eosinophils 1.9 %    % Basophils 0.7 %    % Immature  Granulocytes 0.3 %    Nucleated RBCs 0 0 /100    Absolute Neutrophil 5.9 1.6 - 8.3 10e9/L    Absolute Lymphocytes 1.2 0.8 - 5.3 10e9/L    Absolute Monocytes 0.2 0.0 - 1.3 10e9/L    Absolute Eosinophils 0.1 0.0 - 0.7 10e9/L    Absolute Basophils 0.1 0.0 - 0.2 10e9/L    Abs Immature Granulocytes 0.0 0 - 0.4 10e9/L    Absolute Nucleated RBC 0.0        Imaging Studies:  None for today's visit      ASSESSMENT/PLAN:    #1  Breast cancer:  Diagnosed with Stage 1, pT1c N1mi M0 carcinoma of the right breast in July 2015.  He underwent mastectomy with node dissection with 1/17 nodes revealing micrometastases; tumor was 2cm, ER/WA positive, HER-2/thomas negative.  BRCA2 positive.  He'll continue with tamoxifen 20 mg daily and follow up in 6 months with a CBC, CMP.    I encouraged patient to call with any questions or concerns.       Radha Sales NP  APRN, FNP-BC, AOCNP

## 2019-11-26 ENCOUNTER — OFFICE VISIT (OUTPATIENT)
Dept: FAMILY MEDICINE | Facility: OTHER | Age: 84
End: 2019-11-26
Attending: PHYSICIAN ASSISTANT
Payer: MEDICARE

## 2019-11-26 ENCOUNTER — ONCOLOGY VISIT (OUTPATIENT)
Dept: ONCOLOGY | Facility: OTHER | Age: 84
End: 2019-11-26
Attending: NURSE PRACTITIONER
Payer: MEDICARE

## 2019-11-26 VITALS
WEIGHT: 174.6 LBS | HEIGHT: 67 IN | BODY MASS INDEX: 27.4 KG/M2 | TEMPERATURE: 98.9 F | SYSTOLIC BLOOD PRESSURE: 110 MMHG | HEART RATE: 82 BPM | OXYGEN SATURATION: 97 % | DIASTOLIC BLOOD PRESSURE: 80 MMHG | RESPIRATION RATE: 20 BRPM

## 2019-11-26 VITALS
DIASTOLIC BLOOD PRESSURE: 70 MMHG | OXYGEN SATURATION: 96 % | SYSTOLIC BLOOD PRESSURE: 132 MMHG | BODY MASS INDEX: 25.84 KG/M2 | HEART RATE: 68 BPM | WEIGHT: 165 LBS

## 2019-11-26 DIAGNOSIS — C50.921 MALIGNANT NEOPLASM OF RIGHT MALE BREAST, UNSPECIFIED ESTROGEN RECEPTOR STATUS, UNSPECIFIED SITE OF BREAST (H): Primary | ICD-10-CM

## 2019-11-26 DIAGNOSIS — M25.511 RIGHT SHOULDER PAIN, UNSPECIFIED CHRONICITY: Primary | ICD-10-CM

## 2019-11-26 DIAGNOSIS — C50.921 MALIGNANT NEOPLASM OF RIGHT MALE BREAST, UNSPECIFIED ESTROGEN RECEPTOR STATUS, UNSPECIFIED SITE OF BREAST (H): ICD-10-CM

## 2019-11-26 LAB
ALBUMIN SERPL-MCNC: 3.8 G/DL (ref 3.4–5)
ALP SERPL-CCNC: 86 U/L (ref 40–150)
ALT SERPL W P-5'-P-CCNC: 25 U/L (ref 0–70)
ANION GAP SERPL CALCULATED.3IONS-SCNC: 6 MMOL/L (ref 3–14)
AST SERPL W P-5'-P-CCNC: 24 U/L (ref 0–45)
BASOPHILS # BLD AUTO: 0.1 10E9/L (ref 0–0.2)
BASOPHILS NFR BLD AUTO: 0.7 %
BILIRUB SERPL-MCNC: 0.2 MG/DL (ref 0.2–1.3)
BUN SERPL-MCNC: 19 MG/DL (ref 7–30)
CALCIUM SERPL-MCNC: 8.9 MG/DL (ref 8.5–10.1)
CHLORIDE SERPL-SCNC: 108 MMOL/L (ref 94–109)
CO2 SERPL-SCNC: 25 MMOL/L (ref 20–32)
CREAT SERPL-MCNC: 1.01 MG/DL (ref 0.66–1.25)
DIFFERENTIAL METHOD BLD: ABNORMAL
EOSINOPHIL # BLD AUTO: 0.1 10E9/L (ref 0–0.7)
EOSINOPHIL NFR BLD AUTO: 1.9 %
ERYTHROCYTE [DISTWIDTH] IN BLOOD BY AUTOMATED COUNT: 14.8 % (ref 10–15)
GFR SERPL CREATININE-BSD FRML MDRD: 67 ML/MIN/{1.73_M2}
GLUCOSE SERPL-MCNC: 125 MG/DL (ref 70–99)
HCT VFR BLD AUTO: 35.5 % (ref 40–53)
HGB BLD-MCNC: 11.6 G/DL (ref 13.3–17.7)
IMM GRANULOCYTES # BLD: 0 10E9/L (ref 0–0.4)
IMM GRANULOCYTES NFR BLD: 0.3 %
LYMPHOCYTES # BLD AUTO: 1.2 10E9/L (ref 0.8–5.3)
LYMPHOCYTES NFR BLD AUTO: 15.7 %
MCH RBC QN AUTO: 29.7 PG (ref 26.5–33)
MCHC RBC AUTO-ENTMCNC: 32.7 G/DL (ref 31.5–36.5)
MCV RBC AUTO: 91 FL (ref 78–100)
MONOCYTES # BLD AUTO: 0.2 10E9/L (ref 0–1.3)
MONOCYTES NFR BLD AUTO: 2.9 %
NEUTROPHILS # BLD AUTO: 5.9 10E9/L (ref 1.6–8.3)
NEUTROPHILS NFR BLD AUTO: 78.5 %
NRBC # BLD AUTO: 0 10*3/UL
NRBC BLD AUTO-RTO: 0 /100
PLATELET # BLD AUTO: 268 10E9/L (ref 150–450)
POTASSIUM SERPL-SCNC: 4.4 MMOL/L (ref 3.4–5.3)
PROT SERPL-MCNC: 7.5 G/DL (ref 6.8–8.8)
RBC # BLD AUTO: 3.9 10E12/L (ref 4.4–5.9)
SODIUM SERPL-SCNC: 139 MMOL/L (ref 133–144)
WBC # BLD AUTO: 7.6 10E9/L (ref 4–11)

## 2019-11-26 PROCEDURE — 99213 OFFICE O/P EST LOW 20 MIN: CPT | Performed by: NURSE PRACTITIONER

## 2019-11-26 PROCEDURE — 85025 COMPLETE CBC W/AUTO DIFF WBC: CPT | Mod: ZL | Performed by: NURSE PRACTITIONER

## 2019-11-26 PROCEDURE — 80053 COMPREHEN METABOLIC PANEL: CPT | Mod: ZL | Performed by: NURSE PRACTITIONER

## 2019-11-26 PROCEDURE — G0463 HOSPITAL OUTPT CLINIC VISIT: HCPCS | Mod: 25

## 2019-11-26 PROCEDURE — 99212 OFFICE O/P EST SF 10 MIN: CPT | Mod: 25 | Performed by: FAMILY MEDICINE

## 2019-11-26 PROCEDURE — G0463 HOSPITAL OUTPT CLINIC VISIT: HCPCS

## 2019-11-26 PROCEDURE — G0463 HOSPITAL OUTPT CLINIC VISIT: HCPCS | Mod: 27,25

## 2019-11-26 PROCEDURE — 36415 COLL VENOUS BLD VENIPUNCTURE: CPT | Mod: ZL | Performed by: NURSE PRACTITIONER

## 2019-11-26 PROCEDURE — 20610 DRAIN/INJ JOINT/BURSA W/O US: CPT | Mod: RT | Performed by: FAMILY MEDICINE

## 2019-11-26 RX ORDER — TRIAMCINOLONE ACETONIDE 40 MG/ML
80 INJECTION, SUSPENSION INTRA-ARTICULAR; INTRAMUSCULAR ONCE
Status: DISCONTINUED | OUTPATIENT
Start: 2019-11-26 | End: 2019-11-26

## 2019-11-26 RX ORDER — LIDOCAINE HYDROCHLORIDE 10 MG/ML
5 INJECTION, SOLUTION INFILTRATION; PERINEURAL ONCE
Status: DISCONTINUED | OUTPATIENT
Start: 2019-11-26 | End: 2019-11-26

## 2019-11-26 RX ORDER — LIDOCAINE HYDROCHLORIDE 10 MG/ML
5 INJECTION, SOLUTION EPIDURAL; INFILTRATION; INTRACAUDAL; PERINEURAL ONCE
Status: DISCONTINUED | OUTPATIENT
Start: 2019-11-26 | End: 2019-11-26

## 2019-11-26 RX ADMIN — TRIAMCINOLONE ACETONIDE 80 MG: 40 INJECTION, SUSPENSION INTRA-ARTICULAR; INTRAMUSCULAR at 14:12

## 2019-11-26 RX ADMIN — LIDOCAINE HYDROCHLORIDE 5 ML: 10 INJECTION, SOLUTION EPIDURAL; INFILTRATION; INTRACAUDAL; PERINEURAL at 13:15

## 2019-11-26 ASSESSMENT — MIFFLIN-ST. JEOR: SCORE: 1435.63

## 2019-11-26 ASSESSMENT — PAIN SCALES - GENERAL
PAINLEVEL: NO PAIN (0)
PAINLEVEL: NO PAIN (0)

## 2019-11-26 ASSESSMENT — PATIENT HEALTH QUESTIONNAIRE - PHQ9
SUM OF ALL RESPONSES TO PHQ QUESTIONS 1-9: 0
SUM OF ALL RESPONSES TO PHQ QUESTIONS 1-9: 0

## 2019-11-26 NOTE — PROGRESS NOTES
Subjective     Lio Daly is a 85 year old male who presents to clinic today for the following health issues:    HPI   Musculoskeletal problem/pain      Duration: ongoing    Description  Location: right shoulder    Intensity:  moderate, severe    Accompanying signs and symptoms: has lymphedema in the arm and had the right breast removed from cancer.     History  Previous similar problem: YES- ongoing  Previous evaluation:  x-ray and MRI    Precipitating or alleviating factors:  Trauma or overuse: YES  Aggravating factors include: overuse and constant    Therapies tried and outcome: pain medications and shoulder  Patient is requesting cortisone injections. Patient understand provider today does not do these injections. Patient will make another appointment with Holger to get injection. Patient would still like to be seen today.     PROBLEMS TO ADD ON...    Patient Active Problem List   Diagnosis     Prediabetes     Gastroesophageal reflux disease without esophagitis     Other specified hypothyroidism     Cancer of right male breast (H)     Prostatism     Primary breast malignancy, right (H)     ACP (advance care planning)     HTN, goal below 140/80     Hypothyroidism, unspecified type     Malignant neoplasm of right male breast (H)     Anxiety     Controlled substance agreement signed     BRCA2 positive     Past Surgical History:   Procedure Laterality Date     CARDIAC SURGERY      angiogram 1992 [facility]     COLONOSCOPY      1998     GENITOURINARY SURGERY      prostate bx [place]     GENITOURINARY SURGERY      Prostate bx [facility]     GI SURGERY      EGD     GI SURGERY      Pt has EGD with removal of food from esophagus     MASTECTOMY SIMPLE, SENTINEL NODE, COMBINED Right 7/27/2015    Procedure: COMBINED MASTECTOMY SIMPLE, SENTINEL NODE;  Surgeon: Aliyah Garsia MD;  Location: [facility]     ORTHOPEDIC SURGERY      bilateral knee replacements       Social History     Tobacco Use     Smoking status: Former  Smoker     Packs/day: 0.50     Years: 47.00     Pack years: 23.50     Types: Cigarettes     Start date: 1948     Last attempt to quit: 1995     Years since quittin.8     Smokeless tobacco: Never Used   Substance Use Topics     Alcohol use: No     Comment: former     Family History   Problem Relation Age of Onset     Cancer - colorectal Mother      Eye Disorder Mother         cataracts     Musculoskeletal Disorder Mother         CTS     Colon Cancer Mother      Other Cancer Mother      Cancer Father         lung cancer     Genitourinary Problems Father         prostate problems     Other Cancer Father         lung     C.A.D. Brother         CABG     Eye Disorder Brother         cataracts     Breast Cancer Other      Ovarian Cancer Other      Pancreatic Cancer Other      Diabetes No family hx of      Coronary Artery Disease No family hx of      Cerebrovascular Disease No family hx of      Hyperlipidemia No family hx of      Hypertension No family hx of      Prostate Cancer No family hx of      Thyroid Disease No family hx of      Genetic Disorder No family hx of      Asthma No family hx of      Anesthesia Reaction No family hx of          Current Outpatient Medications   Medication Sig Dispense Refill     acetaminophen (TYLENOL) 325 MG tablet Take 325-650 mg by mouth every 6 hours as needed for mild pain       ALPRAZolam (XANAX) 0.5 MG tablet TAKE 1 TO 2 TABLETS BY MOUTH DAILY AT BEDTIME AS NEEDED 180 tablet 0     Ascorbic Acid (VITAMIN C PO) Take 500 mg by mouth daily       atenolol (TENORMIN) 50 MG tablet TAKE 1 TABLET BY MOUTH DAILY 90 tablet 0     gabapentin (NEURONTIN) 300 MG capsule TAKE 1 CAPSULE BY MOUTH 2 TIMES A  capsule 0     levothyroxine (SYNTHROID/LEVOTHROID) 75 MCG tablet TAKE 1 TABLET BY MOUTH DAILY 90 tablet 2     omeprazole (PRILOSEC) 20 MG DR capsule TAKE 1 CAPSULE BY MOUTH DAILY 90 capsule 0     order for DME Equipment being ordered: right hand compression glove 1 each 0      pravastatin (PRAVACHOL) 40 MG tablet TAKE 1 TABLET BY MOUTH EVERY MORNING AND 1 EVERY OTHER EVENING 135 tablet 2     sennosides (SENOKOT) 8.6 MG tablet Take 2 tablets by mouth At Bedtime Reported on 5/17/2017       tamoxifen (NOLVADEX) 20 MG tablet TAKE 1 TABLET BY MOUTH DAILY 90 tablet 0     tamsulosin (FLOMAX) 0.4 MG capsule TAKE 1 CAPSULE BY MOUTH EVERY MORNING AND 1 CAPSULE AT NIGHT 180 capsule 0     triamcinolone (KENALOG) 0.5 % external cream APPLY SPARINGLY TO AFFECTED AREA 3 TIMES A DAY 30 g 0     VENTOLIN  (90 BASE) MCG/ACT inhaler USE 2 PUFFS EVERY 6 HOURS AS NEEDED FOR SHORTNESS OF BREATH/DYSPNEA 18 each 1     zolpidem (AMBIEN) 5 MG tablet TAKE 1 TABLET BY MOUTH NIGHTLY AS NEEDED FOR SLEEP 90 tablet 0     No Known Allergies    PROBLEMS TO ADD ON...  Reviewed and updated as needed this visit by Provider         Review of Systems   ROS COMP: Constitutional, HEENT, cardiovascular, pulmonary, gi and gu systems are negative, except as otherwise noted.      Objective    There were no vitals taken for this visit.  There is no height or weight on file to calculate BMI.  Physical Exam   GENERAL: healthy, alert and no distress  MS: no gross musculoskeletal defects noted, no edema  MS: pain with shoulder abduction in any plane.     Diagnostic Test Results:  Labs reviewed in Epic        Assessment & Plan       ICD-10-CM    1. Right shoulder pain, unspecified chronicity M25.511 triamcinolone (KENALOG-40) injection 80 mg     lidocaine (PF) (XYLOCAINE) 1 % injection 5 mL     Large Joint/Bursa injection and/or drainage (Shoulder, Knee)     triamcinolone (KENALOG-40) injection 80 mg     lidocaine 1 % injection 5 mL          Injected today.  F/u with ongoing concerns.  He has had this a few times and it helps a lot.      PROCEDURE:  JOINT ASPIRATION/INJECTION.         After a discussion of risks, benefits and side effects of procedure, informed patient consent was obtained.       The right subacromial bursa was  prepped and draped in the usual clean fashion (sterile not required for this procedure).      ASPIRATION: Not performed.                               INJECTION:  Using 4 cc of 1% lidocaine mixed                           with 80 mg of triamcinolone, the right subacromial bursa was successfully injected                          without complication.  Patient did experience some pain                          relief following injection.    No follow-ups on file.    Rubén Wren MD  [facility]

## 2019-11-26 NOTE — NURSING NOTE
Prior to injection, verified patient identity using patient's name and date of birth.      Joint injection was performed.   right shoulder    Kenalog  Drug Amount Wasted:  Yes: 3 mg/ml   Vial/Syringe: Single dose vial  Expiration Date:  10/2020    Lidocaine  Drug Amount Wasted:  None.  Vial/Syringe: Single dose vial  Expiration Date:  10/2022  Tonia Noriega

## 2019-11-26 NOTE — NURSING NOTE
"Chief Complaint   Patient presents with     RECHECK     Follow up Malignant neoplasm of right male breast        Initial /80   Pulse 82   Temp 98.9  F (37.2  C) (Tympanic)   Resp 20   Ht 1.702 m (5' 7\")   Wt 79.2 kg (174 lb 9.7 oz)   SpO2 97%   BMI 27.35 kg/m   Estimated body mass index is 27.35 kg/m  as calculated from the following:    Height as of this encounter: 1.702 m (5' 7\").    Weight as of this encounter: 79.2 kg (174 lb 9.7 oz).  Medication Reconciliation: complete.  Immunizations and advance directives status reviewed. Pain scale =0 , PHQ-9 =0.            Brianna Alexandar LPN    "

## 2019-11-26 NOTE — PATIENT INSTRUCTIONS
We would like to see you back in 6 months. Please come 30 minutes prior for lab work.     When you are in need of a refill, please call your pharmacy and they will send us a request.     If you have any questions please call [phone]    Other instructions:  none

## 2019-11-26 NOTE — NURSING NOTE
"Chief Complaint   Patient presents with     Musculoskeletal Problem       Initial /70   Pulse 68   Wt 74.8 kg (165 lb)   SpO2 96%   BMI 25.84 kg/m   Estimated body mass index is 25.84 kg/m  as calculated from the following:    Height as of 5/24/19: 1.702 m (5' 7\").    Weight as of this encounter: 74.8 kg (165 lb).  Medication Reconciliation: complete  Cordelia Waddell MA    "

## 2019-12-02 DIAGNOSIS — F41.9 ANXIETY: ICD-10-CM

## 2019-12-02 DIAGNOSIS — G47.00 PERSISTENT INSOMNIA: ICD-10-CM

## 2019-12-02 DIAGNOSIS — E03.9 HYPOTHYROIDISM: ICD-10-CM

## 2019-12-03 RX ORDER — ZOLPIDEM TARTRATE 5 MG/1
TABLET ORAL
Qty: 90 TABLET | Refills: 0 | Status: SHIPPED | OUTPATIENT
Start: 2019-12-03 | End: 2020-03-05

## 2019-12-03 RX ORDER — ALPRAZOLAM 0.5 MG
TABLET ORAL
Qty: 180 TABLET | Refills: 0 | Status: SHIPPED | OUTPATIENT
Start: 2019-12-03 | End: 2020-03-05

## 2019-12-03 RX ORDER — LEVOTHYROXINE SODIUM 75 UG/1
TABLET ORAL
Qty: 90 TABLET | Refills: 0 | Status: SHIPPED | OUTPATIENT
Start: 2019-12-03 | End: 2020-03-09

## 2019-12-03 NOTE — TELEPHONE ENCOUNTER
Ambien      Last Written Prescription Date:  9/4/19  Last Fill Quantity: 90,   # refills: 0  Last Office Visit: 11/26/19  Future Office visit:       Routing refill request to provider for review/approval because:  Drug not on the [facility], [facility] or [facility] refill protocol or controlled substance    Xanax      Last Written Prescription Date:  9/4/19  Last Fill Quantity: 180,   # refills: 0  Last Office Visit: 11/26/19  Future Office visit:       Routing refill request to provider for review/approval because:  Drug not on the [facility], [facility] or [facility] refill protocol or controlled substance

## 2019-12-27 DIAGNOSIS — N40.0 BENIGN PROSTATIC HYPERPLASIA, UNSPECIFIED WHETHER LOWER URINARY TRACT SYMPTOMS PRESENT: ICD-10-CM

## 2019-12-27 DIAGNOSIS — I10 HTN, GOAL BELOW 140/80: ICD-10-CM

## 2019-12-27 DIAGNOSIS — K21.9 GASTROESOPHAGEAL REFLUX DISEASE, ESOPHAGITIS PRESENCE NOT SPECIFIED: ICD-10-CM

## 2019-12-27 DIAGNOSIS — E78.5 HYPERLIPIDEMIA LDL GOAL <130: ICD-10-CM

## 2019-12-27 NOTE — TELEPHONE ENCOUNTER
Tenormin       Last Written Prescription Date:  10/2/2019  Last Fill Quantity: 90,   # refills: 0    Prilosec       Last Written Prescription Date:  10/2/2019  Last Fill Quantity: 90,   # refills: 0    Flomax       Last Written Prescription Date:  10/2/2019  Last Fill Quantity: 180,   # refills: 0    Pravachol       Last Written Prescription Date:  2/18/2019  Last Fill Quantity: 135,   # refills: 2  Last Office Visit: 11/26/2019  Future Office visit:

## 2019-12-30 RX ORDER — PRAVASTATIN SODIUM 40 MG
TABLET ORAL
Qty: 135 TABLET | Refills: 0 | Status: SHIPPED | OUTPATIENT
Start: 2019-12-30 | End: 2020-04-27

## 2019-12-30 RX ORDER — ATENOLOL 50 MG/1
TABLET ORAL
Qty: 90 TABLET | Refills: 0 | Status: SHIPPED | OUTPATIENT
Start: 2019-12-30 | End: 2020-03-30

## 2019-12-30 RX ORDER — TAMSULOSIN HYDROCHLORIDE 0.4 MG/1
CAPSULE ORAL
Qty: 180 CAPSULE | Refills: 0 | Status: SHIPPED | OUTPATIENT
Start: 2019-12-30 | End: 2020-03-30

## 2020-01-24 DIAGNOSIS — M54.16 LUMBAR RADICULOPATHY: ICD-10-CM

## 2020-01-24 DIAGNOSIS — C50.911 PRIMARY BREAST MALIGNANCY, RIGHT (H): ICD-10-CM

## 2020-01-29 RX ORDER — TAMOXIFEN CITRATE 20 MG/1
TABLET ORAL
Qty: 90 TABLET | Refills: 0 | Status: SHIPPED | OUTPATIENT
Start: 2020-01-29 | End: 2020-04-27

## 2020-01-29 RX ORDER — GABAPENTIN 300 MG/1
CAPSULE ORAL
Qty: 180 CAPSULE | Refills: 0 | Status: SHIPPED | OUTPATIENT
Start: 2020-01-29 | End: 2020-04-27

## 2020-01-29 NOTE — TELEPHONE ENCOUNTER
Tamoxifen  Last Written Prescription Date:  10.25.19  Last Fill Quantity: 90,   # refills: 0  Last Office Visit: 11.26.19  Future Office visit:         Routing refill request to provider for review/approval because:  Drug not on the [facility], [facility] or  [facility] refill protocol or controlled substance      Neurontin     Last Written Prescription Date:  10.25.19  Last Fill Quantity: 180,   # refills: 0  Last Office Visit: 11.26.19  Future Office visit:       Routing refill request to provider for review/approval because:  Drug not on the [facility], [facility] or  [facility] refill protocol or controlled substance      Pended.Thank you,    Janina Au, RN

## 2020-03-02 DIAGNOSIS — F41.9 ANXIETY: ICD-10-CM

## 2020-03-02 DIAGNOSIS — G47.00 PERSISTENT INSOMNIA: ICD-10-CM

## 2020-03-03 NOTE — TELEPHONE ENCOUNTER
Alprazolam       Last Written Prescription Date: 12/3/19  Last Fill Quantity: 180,   # refills: 0  Last Office Visit: 11/26/19  Future Office visit:    Next 5 appointments (look out 90 days)    May 26, 2020  2:15 PM CDT  (Arrive by 2:00 PM)  Return Visit with Radha Sales NP  [facility] [address] [facility] [address]  [phone]           Routing refill request to provider for review/approval because:      Zolpidem       Last Written Prescription Date:  12/3/19  Last Fill Quantity: 90,   # refills: 0  Last Office Visit: 11/26/19  Future Office visit:    Next 5 appointments (look out 90 days)    May 26, 2020  2:15 PM CDT  (Arrive by 2:00 PM)  Return Visit with Radha Sales NP  [facility] [address] [facility] [place] [facility] [address]  [phone]           Routing refill request to provider for review/approval because:

## 2020-03-05 RX ORDER — ALPRAZOLAM 0.5 MG
TABLET ORAL
Qty: 180 TABLET | Refills: 0 | Status: SHIPPED | OUTPATIENT
Start: 2020-03-05 | End: 2020-08-12

## 2020-03-05 RX ORDER — ZOLPIDEM TARTRATE 5 MG/1
TABLET ORAL
Qty: 90 TABLET | Refills: 0 | Status: SHIPPED | OUTPATIENT
Start: 2020-03-05 | End: 2020-07-15

## 2020-03-30 DIAGNOSIS — I10 HTN, GOAL BELOW 140/80: ICD-10-CM

## 2020-03-30 DIAGNOSIS — N40.0 BENIGN PROSTATIC HYPERPLASIA, UNSPECIFIED WHETHER LOWER URINARY TRACT SYMPTOMS PRESENT: ICD-10-CM

## 2020-03-30 DIAGNOSIS — K21.9 GASTROESOPHAGEAL REFLUX DISEASE, ESOPHAGITIS PRESENCE NOT SPECIFIED: ICD-10-CM

## 2020-03-30 RX ORDER — TAMSULOSIN HYDROCHLORIDE 0.4 MG/1
CAPSULE ORAL
Qty: 180 CAPSULE | Refills: 1 | Status: SHIPPED | OUTPATIENT
Start: 2020-03-30 | End: 2020-09-24

## 2020-03-30 RX ORDER — ATENOLOL 50 MG/1
TABLET ORAL
Qty: 90 TABLET | Refills: 1 | Status: SHIPPED | OUTPATIENT
Start: 2020-03-30 | End: 2020-09-24

## 2020-04-27 DIAGNOSIS — E03.9 HYPOTHYROIDISM, UNSPECIFIED TYPE: ICD-10-CM

## 2020-04-27 DIAGNOSIS — M54.16 LUMBAR RADICULOPATHY: ICD-10-CM

## 2020-04-27 DIAGNOSIS — C50.911 PRIMARY BREAST MALIGNANCY, RIGHT (H): ICD-10-CM

## 2020-04-27 DIAGNOSIS — E78.5 HYPERLIPIDEMIA LDL GOAL <130: ICD-10-CM

## 2020-04-27 RX ORDER — PRAVASTATIN SODIUM 40 MG
TABLET ORAL
Qty: 135 TABLET | Refills: 0 | Status: SHIPPED | OUTPATIENT
Start: 2020-04-27 | End: 2020-08-12

## 2020-04-27 RX ORDER — TAMOXIFEN CITRATE 20 MG/1
TABLET ORAL
Qty: 90 TABLET | Refills: 0 | Status: SHIPPED | OUTPATIENT
Start: 2020-04-27 | End: 2020-07-28

## 2020-04-27 RX ORDER — LEVOTHYROXINE SODIUM 75 UG/1
TABLET ORAL
Qty: 90 TABLET | Refills: 0 | Status: SHIPPED | OUTPATIENT
Start: 2020-04-27 | End: 2020-09-24

## 2020-04-27 RX ORDER — GABAPENTIN 300 MG/1
CAPSULE ORAL
Qty: 180 CAPSULE | Refills: 0 | Status: SHIPPED | OUTPATIENT
Start: 2020-04-27 | End: 2020-07-28

## 2020-04-27 NOTE — TELEPHONE ENCOUNTER
tamoxifen (NOLVADEX) 20 MG tablet       Last Written Prescription Date:  1.29.2020  Last Fill Quantity: 90,   # refills: 0  Last Office Visit: 11.26.19  Future Office visit:    Next 5 appointments (look out 90 days)    May 26, 2020  2:15 PM CDT  (Arrive by 2:00 PM)  Return Visit with Radha Sales NP  [facility] ([facility] ) [address]  [facility] [address]  [phone]   Jul 15, 2020 10:15 AM CDT  (Arrive by 10:00 AM)  [facility] Rub[facility]ren [facility]  [facility] [address] [place] [facility] [place] [address]  [phone]

## 2020-04-27 NOTE — TELEPHONE ENCOUNTER
gabapentin      Last Written Prescription Date:  1/29/20  Last Fill Quantity: 180,   # refills: 0  Last Office Visit: 11/26/19  Future Office visit:    Next 5 appointments (look out 90 days)    May 26, 2020  2:15 PM CDT  (Arrive by 2:00 PM)  Return Visit with Radha Sales NP  [facility] ([facility] ) [address] [facility] [address]  [phone]   Jul 15, 2020 10:15 AM CDT  (Arrive by 10:00 AM)  SHORT with Rubén Wren MD  [facility] ([facility] ) [address]  [facility] [address]  [phone]           Routing refill request to provider for review/approval because:  Drug not on the G, P or [facility] refill protocol or controlled substance    levothyroxine      Last Written Prescription Date:  3/9/20  Last Fill Quantity: 90,   # refills: 0  Last Office Visit: 11/26/19  Future Office visit:    Next 5 appointments (look out 90 days)    May 26, 2020  2:15 PM CDT  (Arrive by 2:00 PM)  Return Visit with Radha Sales NP  [facility] [address] [address] [facility] [address]  [phone]   Jul 15, 2020 10:15 AM CDT  (Arrive by 10:00 AM)  SHORT with Rubén Wren MD  [facility] ([facility] ) [address]  [facility] [address]  [phone]           Routing refill request to provider for review/approval because:  TSH   Date Value Ref Range Status   01/16/2019 1.86 0.40 - 4.00 mU/L Final        pravastatin      Last Written Prescription Date:  12/30/19  Last Fill Quantity: 135,   # refills: 0  Last Office Visit: 11/26/19  Future Office visit:    Next 5 appointments (look out 90 days)    May 26, 2020  2:15 PM CDT  (Arrive by 2:00 PM)  Return Visit with Radha Sales NP  [facility] ([facility] ) [address] [address]  [phone]   Jul 15, 2020 10:15 AM  CDT  (Arrive by 10:00 AM)  SHORT with Rubén Wren MD  [facility] ([facility] ) [address]  [facility] [address]  [phone]           Routing refill request to provider for review/approval because:  LDL Cholesterol Calculated   Date Value Ref Range Status   01/16/2019 70 <100 mg/dL Final     Comment:     Desirable:       <100 mg/dl

## 2020-05-22 DIAGNOSIS — C50.921 MALIGNANT NEOPLASM OF RIGHT MALE BREAST, UNSPECIFIED ESTROGEN RECEPTOR STATUS, UNSPECIFIED SITE OF BREAST (H): ICD-10-CM

## 2020-05-22 LAB
ALBUMIN SERPL-MCNC: 3.7 G/DL (ref 3.4–5)
ALP SERPL-CCNC: 70 U/L (ref 40–150)
ALT SERPL W P-5'-P-CCNC: 24 U/L (ref 0–70)
ANION GAP SERPL CALCULATED.3IONS-SCNC: 5 MMOL/L (ref 3–14)
AST SERPL W P-5'-P-CCNC: 23 U/L (ref 0–45)
BASOPHILS # BLD AUTO: 0.1 10E9/L (ref 0–0.2)
BASOPHILS NFR BLD AUTO: 0.9 %
BILIRUB SERPL-MCNC: 0.4 MG/DL (ref 0.2–1.3)
BUN SERPL-MCNC: 24 MG/DL (ref 7–30)
CALCIUM SERPL-MCNC: 9.2 MG/DL (ref 8.5–10.1)
CHLORIDE SERPL-SCNC: 109 MMOL/L (ref 94–109)
CO2 SERPL-SCNC: 27 MMOL/L (ref 20–32)
CREAT SERPL-MCNC: 1.26 MG/DL (ref 0.66–1.25)
DIFFERENTIAL METHOD BLD: ABNORMAL
EOSINOPHIL # BLD AUTO: 0.6 10E9/L (ref 0–0.7)
EOSINOPHIL NFR BLD AUTO: 6.7 %
ERYTHROCYTE [DISTWIDTH] IN BLOOD BY AUTOMATED COUNT: 15 % (ref 10–15)
GFR SERPL CREATININE-BSD FRML MDRD: 51 ML/MIN/{1.73_M2}
GLUCOSE SERPL-MCNC: 109 MG/DL (ref 70–99)
HCT VFR BLD AUTO: 35.3 % (ref 40–53)
HGB BLD-MCNC: 11.4 G/DL (ref 13.3–17.7)
IMM GRANULOCYTES # BLD: 0 10E9/L (ref 0–0.4)
IMM GRANULOCYTES NFR BLD: 0.4 %
LYMPHOCYTES # BLD AUTO: 2.3 10E9/L (ref 0.8–5.3)
LYMPHOCYTES NFR BLD AUTO: 27.2 %
MCH RBC QN AUTO: 29.8 PG (ref 26.5–33)
MCHC RBC AUTO-ENTMCNC: 32.3 G/DL (ref 31.5–36.5)
MCV RBC AUTO: 92 FL (ref 78–100)
MONOCYTES # BLD AUTO: 0.7 10E9/L (ref 0–1.3)
MONOCYTES NFR BLD AUTO: 8.5 %
NEUTROPHILS # BLD AUTO: 4.8 10E9/L (ref 1.6–8.3)
NEUTROPHILS NFR BLD AUTO: 56.3 %
NRBC # BLD AUTO: 0 10*3/UL
NRBC BLD AUTO-RTO: 0 /100
PLATELET # BLD AUTO: 269 10E9/L (ref 150–450)
POTASSIUM SERPL-SCNC: 4.5 MMOL/L (ref 3.4–5.3)
PROT SERPL-MCNC: 7.6 G/DL (ref 6.8–8.8)
RBC # BLD AUTO: 3.82 10E12/L (ref 4.4–5.9)
SODIUM SERPL-SCNC: 141 MMOL/L (ref 133–144)
WBC # BLD AUTO: 8.5 10E9/L (ref 4–11)

## 2020-05-22 PROCEDURE — 85025 COMPLETE CBC W/AUTO DIFF WBC: CPT | Mod: ZL | Performed by: NURSE PRACTITIONER

## 2020-05-22 PROCEDURE — 36415 COLL VENOUS BLD VENIPUNCTURE: CPT | Mod: ZL | Performed by: NURSE PRACTITIONER

## 2020-05-22 PROCEDURE — 80053 COMPREHEN METABOLIC PANEL: CPT | Mod: ZL | Performed by: NURSE PRACTITIONER

## 2020-05-22 NOTE — PROGRESS NOTES
"Lio Daly is a 85 year old male who is being evaluated via a billable telephone visit.      The patient has been notified of following:     \"This telephone visit will be conducted via a call between you and your physician/provider. We have found that certain health care needs can be provided without the need for a physical exam.  This service lets us provide the care you need with a short phone conversation.  If a prescription is necessary we can send it directly to your pharmacy.  If lab work is needed we can place an order for that and you can then stop by our lab to have the test done at a later time.    Telephone visits are billed at different rates depending on your insurance coverage. During this emergency period, for some insurers they may be billed the same as an in-person visit.  Please reach out to your insurance provider with any questions.    If during the course of the call the physician/provider feels a telephone visit is not appropriate, you will not be charged for this service.\"    Patient has given verbal consent for Telephone visit?  Yes    What phone number would you like to be contacted at? [phone]    How would you like to obtain your AVS? 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  Patient underwent a right mastectomy with axillary node dissection in July 2015. He was placed on 20 mg of tamoxifen and remains on this.         He states his been doing good and essentially has no new complaints.  He states he does continue to have discomfort in the right shoulder and plans to see his PCP for an injection in the next couple weeks.  He tells me he did have an episode of blood in " his urine about 3 to 4 months ago where he also had some stinging with urination.  He states this only happened 1 time and has not happened since.  He states he did have this issue about a year ago.  Denies any pain with urination or any blood in his urine now.  Denies any new back pain.  He denies any new lumps or masses to the chest wall.  Otherwise, he is doing well.    Oncologic History:     Patient had presented with complaints of right nipple irritation over a six-month period and was noted to have a mass in the right breast. Mammogram was completed followed by biopsy and pathology showed an invasive carcinoma. On 7/27/2015, he underwent a right modified radical mastectomy with axillary node dissection by Dr. Garsia. 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 DCIS was present, grade 2 with expansive comedonecrosis. Node dissection revealed 1/17 nodes revealing micrometastasis. Tumor was ER/W[place] positive, HER-2/thomas negative. Patient was staged pathologic T1c N1mi M0.      At consultation, patient was not interested in adjuvant chemotherapy and was placed on tamoxifen 20 mg daily.      BRCA 2+      Current Chemo Regime/TX: Tamoxifen 20mg daily initiated 8/2015  Current Cycle: n/a  # of completed cycles: n/a      Previous treatment:  n/a     Past Medical History:   Diagnosis Date     Arthritis      Cancer (H)      Dysphagia 06/28/2004     HTN (hypertension)      Need for prophylactic vaccination and inoculation 12/10/2001     Need for prophylactic vaccination and inoculation, 11/06/2003     Other and unspecified hyperlipidemia 05/01/2001     Prediabetes      Shortness of breath 03/28/2006     Shoulder pain 06/15/2012     Thyroid disease      Unspecified essential hypertension 05/18/2000       Past Surgical History:   Procedure Laterality Date     CARDIAC SURGERY      angiogram 1992 [facility]      COLONOSCOPY      1998     GENITOURINARY SURGERY      prostate bx [place]     GENITOURINARY SURGERY      Prostate bx [facility]     GI SURGERY      EGD     GI SURGERY      Pt has EGD with removal of food from esophagus     MASTECTOMY SIMPLE, SENTINEL NODE, COMBINED Right 7/27/2015    Procedure: COMBINED MASTECTOMY SIMPLE, SENTINEL NODE;  Surgeon: Aliyah Garsia MD;  Location: [facility]     ORTHOPEDIC SURGERY      bilateral knee replacements       Family History   Problem Relation Age of Onset     Cancer - colorectal Mother      Eye Disorder Mother         cataracts     Musculoskeletal Disorder Mother         CTS     Colon Cancer Mother      Other Cancer Mother      Cancer Father         lung cancer     Genitourinary Problems Father         prostate problems     Other Cancer Father         lung     C.A.D. Brother         CABG     Eye Disorder Brother         cataracts     Breast Cancer Other      Ovarian Cancer Other      Pancreatic Cancer Other      Diabetes No family hx of      Coronary Artery Disease No family hx of      Cerebrovascular Disease No family hx of      Hyperlipidemia No family hx of      Hypertension No family hx of      Prostate Cancer No family hx of      Thyroid Disease No family hx of      Genetic Disorder No family hx of      Asthma No family hx of      Anesthesia Reaction No family hx of        Social History     Socioeconomic History     Marital status:      Spouse name: Marisa     Number of children: Not on file     Years of education: Not on file     Highest education level: Not on file   Occupational History     Not on file   Social Needs     Financial resource strain: Not on file     Food insecurity     Worry: Not on file     Inability: Not on file     Transportation needs     Medical: Not on file     Non-medical: Not on file   Tobacco Use     Smoking status: Former Smoker     Packs/day: 0.50     Years: 47.00     Pack years: 23.50     Types: Cigarettes     Start date: 8/17/1948      Last attempt to quit: 1995     Years since quittin.3     Smokeless tobacco: Never Used   Substance and Sexual Activity     Alcohol use: No     Comment: former     Drug use: No     Sexual activity: Not on file   Lifestyle     Physical activity     Days per week: Not on file     Minutes per session: Not on file     Stress: Not on file   Relationships     Social connections     Talks on phone: Not on file     Gets together: Not on file     Attends Congregational service: Not on file     Active member of club or organization: Not on file     Attends meetings of clubs or organizations: Not on file     Relationship status: Not on file     Intimate partner violence     Fear of current or ex partner: Not on file     Emotionally abused: Not on file     Physically abused: Not on file     Forced sexual activity: Not on file   Other Topics Concern      Service Not Asked     Blood Transfusions Not Asked     Caffeine Concern Yes     Comment: coffee, 2 cups daily     Occupational Exposure Not Asked     Hobby Hazards Not Asked     Sleep Concern Not Asked     Stress Concern Not Asked     Weight Concern Not Asked     Special Diet Not Asked     Back Care Not Asked     Exercise Not Asked     Bike Helmet Not Asked     Seat Belt Not Asked     Self-Exams Not Asked     Parent/sibling w/ CABG, MI or angioplasty before 65F 55M? No   Social History Narrative     Not on file       Current Outpatient Medications   Medication     acetaminophen (TYLENOL) 325 MG tablet     ALPRAZolam (XANAX) 0.5 MG tablet     Ascorbic Acid (VITAMIN C PO)     atenolol (TENORMIN) 50 MG tablet     gabapentin (NEURONTIN) 300 MG capsule     levothyroxine (SYNTHROID/LEVOTHROID) 75 MCG tablet     omeprazole (PRILOSEC) 20 MG DR capsule     order for DME     pravastatin (PRAVACHOL) 40 MG tablet     sennosides (SENOKOT) 8.6 MG tablet     tamoxifen (NOLVADEX) 20 MG tablet     tamsulosin (FLOMAX) 0.4 MG capsule     zolpidem (AMBIEN) 5 MG tablet     triamcinolone  (KENALOG) 0.5 % external cream     VENTOLIN  (90 BASE) MCG/ACT inhaler     No current facility-administered medications for this visit.         No Known Allergies    Review Of Systems:  Constitutional:    denies fever, weight changes, chills, and night sweats.  Eyes:    denies blurred or double vision  Ears/Nose/Throat:   denies ear pain, nose problems, difficulty swallowing  Respiratory:   denies shortness of breath, cough  Skin:   denies rash, lesions  Breast/Chest wall:   denies pain, lumps  Cardiovascular:   denies chest pain, palpitations, edema  Gastrointestinal:   denies abdominal pain, bloating, nausea, early satiety; no change in bowel habits or blood in stool  Genitourinary:  See hPI  Musculoskeletal:    denies new muscle pain, bone pain  Neurologic:   denies lightheadedness, headaches, numbness or tingling  Psychiatric:   denies anxiety, depression  Hematologic/Lymphatic/Immunologic:   denies easy bruising, easy bleeding, lumps or bumps noted  Endocrine:   Denies increased thirst      Physical Exam:  No exam/telephone visit      Laboratory:  Results for REAGAN BUSTILLO (MRN [id number]) as of 5/22/2020 15:02   Ref. Range 5/22/2020 10:52   Sodium Latest Ref Range: 133 - 144 mmol/L 141   Potassium Latest Ref Range: 3.4 - 5.3 mmol/L 4.5   Chloride Latest Ref Range: 94 - 109 mmol/L 109   Carbon Dioxide Latest Ref Range: 20 - 32 mmol/L 27   Urea Nitrogen Latest Ref Range: 7 - 30 mg/dL 24   Creatinine Latest Ref Range: 0.66 - 1.25 mg/dL 1.26 (H)   GFR Estimate Latest Ref Range: >60 mL/min/1.73_m2 51 (L)   GFR Estimate If Black Latest Ref Range: >60 mL/min/1.73_m2 59 (L)   Calcium Latest Ref Range: 8.5 - 10.1 mg/dL 9.2   Anion Gap Latest Ref Range: 3 - 14 mmol/L 5   Albumin Latest Ref Range: 3.4 - 5.0 g/dL 3.7   Protein Total Latest Ref Range: 6.8 - 8.8 g/dL 7.6   Bilirubin Total Latest Ref Range: 0.2 - 1.3 mg/dL 0.4   Alkaline Phosphatase Latest Ref Range: 40 - 150 U/L 70   ALT Latest Ref Range: 0 -  70 U/L 24   AST Latest Ref Range: 0 - 45 U/L 23   Glucose Latest Ref Range: 70 - 99 mg/dL 109 (H)   WBC Latest Ref Range: 4.0 - 11.0 10e9/L 8.5   Hemoglobin Latest Ref Range: 13.3 - 17.7 g/dL 11.4 (L)   Hematocrit Latest Ref Range: 40.0 - 53.0 % 35.3 (L)   Platelet Count Latest Ref Range: 150 - 450 10e9/L 269   RBC Count Latest Ref Range: 4.4 - 5.9 10e12/L 3.82 (L)   MCV Latest Ref Range: 78 - 100 fl 92   MCH Latest Ref Range: 26.5 - 33.0 pg 29.8   MCHC Latest Ref Range: 31.5 - 36.5 g/dL 32.3   RDW Latest Ref Range: 10.0 - 15.0 % 15.0   Diff Method Unknown Automated Method   % Neutrophils Latest Units: % 56.3   % Lymphocytes Latest Units: % 27.2   % Monocytes Latest Units: % 8.5   % Eosinophils Latest Units: % 6.7   % Basophils Latest Units: % 0.9   % Immature Granulocytes Latest Units: % 0.4   Nucleated RBCs Latest Ref Range: 0 /100 0   Absolute Neutrophil Latest Ref Range: 1.6 - 8.3 10e9/L 4.8   Absolute Lymphocytes Latest Ref Range: 0.8 - 5.3 10e9/L 2.3   Absolute Monocytes Latest Ref Range: 0.0 - 1.3 10e9/L 0.7   Absolute Eosinophils Latest Ref Range: 0.0 - 0.7 10e9/L 0.6   Absolute Basophils Latest Ref Range: 0.0 - 0.2 10e9/L 0.1   Abs Immature Granulocytes Latest Ref Range: 0 - 0.4 10e9/L 0.0   Absolute Nucleated RBC Unknown 0.0     Imaging Studies:  None for today's visit      ASSESSMENT/PLAN:    #1  Breast cancer:  Diagnosed with Stage 1, pT1c N1mi M0 carcinoma of the right breast in July 2015.  He underwent mastectomy with node dissection with 1/17 nodes revealing micrometastases; tumor was 2cm, ER/VA positive, HER-2/thomas negative.  BRCA2 positive.  He'll continue with tamoxifen 20 mg daily and follow up in 6 months with a CBC, CMP.    #2 renal insufficiency: We did discuss returning to the clinic for urinalysis and patient declines.  I did asked that he discuss further with his PCP to see if any further testing is warranted.  He is aware that I will send a note through to his PCP in regards to the change in  his kidney functions and episode of blood in his urine 3 to 4 months ago.    #3  Anemia: This has been fairly stable longstanding.    I encouraged patient to call with any questions or concerns.     Phone call duration: 14 minutes    GERMAIN Riley, FNP-BC, AOCNP

## 2020-05-22 NOTE — NURSING NOTE
"Chief Complaint   Patient presents with     RECHECK     Follow up Malignant neoplasm of right male breast       Initial There were no vitals taken for this visit. Estimated body mass index is 27.35 kg/m  as calculated from the following:    Height as of 11/26/19: 1.702 m (5' 7\").    Weight as of 11/26/19: 79.2 kg (174 lb 9.7 oz).  Medication Reconciliation: complete.  Immunizations and advance directives status reviewed. Pain scale 4 right shoulder , PHQ-2=0.            Brianna Alexandra LPN    "

## 2020-05-26 ENCOUNTER — VIRTUAL VISIT (OUTPATIENT)
Dept: ONCOLOGY | Facility: OTHER | Age: 85
End: 2020-05-26
Attending: NURSE PRACTITIONER
Payer: COMMERCIAL

## 2020-05-26 DIAGNOSIS — D64.9 ANEMIA, UNSPECIFIED TYPE: ICD-10-CM

## 2020-05-26 DIAGNOSIS — C50.921 MALIGNANT NEOPLASM OF RIGHT MALE BREAST, UNSPECIFIED ESTROGEN RECEPTOR STATUS, UNSPECIFIED SITE OF BREAST (H): Primary | ICD-10-CM

## 2020-05-26 DIAGNOSIS — N28.9 RENAL INSUFFICIENCY: ICD-10-CM

## 2020-05-26 PROCEDURE — 99213 OFFICE O/P EST LOW 20 MIN: CPT | Mod: 95 | Performed by: NURSE PRACTITIONER

## 2020-05-26 NOTE — PATIENT INSTRUCTIONS
We would like to see you back in 6 months. Please come 30 minutes prior for lab work.     When you are in need of a refill of your Tamoxifen, please call your pharmacy and they will send us a request.     If you have any questions please call [phone]    Other instructions:  Please follow up with DR. Wren in regards to your kidney functions.

## 2020-07-14 NOTE — PROGRESS NOTES
Subjective     Lio Daly is a 86 year old male who presents to clinic today for the following health issues:    HPI   Hyperlipidemia Follow-Up      Are you regularly taking any medication or supplement to lower your cholesterol?   Yes- pravastatin     Are you having muscle aches or other side effects that you think could be caused by your cholesterol lowering medication?  No    Hypertension Follow-up      Do you check your blood pressure regularly outside of the clinic? Yes     Are you following a low salt diet? Yes    Are your blood pressures ever more than 140 on the top number (systolic) OR more   than 90 on the bottom number (diastolic), for example 140/90? No    Hypothyroidism Follow-up      Since last visit, patient describes the following symptoms: Weight stable, no hair loss, no skin changes, no constipation, no loose stools      PROBLEMS TO ADD ON...doing well.  Having more shoulder pain on the right and would like another shot.  Last one worked really well.  Feeling great.  Still grief, but he is not sad anymore.  It is a nice discussion.   Patient Active Problem List   Diagnosis     Prediabetes     Gastroesophageal reflux disease without esophagitis     Other specified hypothyroidism     Cancer of right male breast (H)     Prostatism     Primary breast malignancy, right (H)     ACP (advance care planning)     HTN, goal below 140/80     Hypothyroidism, unspecified type     Malignant neoplasm of right male breast (H)     Anxiety     Controlled substance agreement signed     BRCA2 positive     Past Surgical History:   Procedure Laterality Date     CARDIAC SURGERY      angiogram 1992 [facility]     COLONOSCOPY      1998     GENITOURINARY SURGERY      prostate bx [place]     GENITOURINARY SURGERY      Prostate bx [facility]     GI SURGERY      EGD     GI SURGERY      Pt has EGD with removal of food from esophagus     MASTECTOMY SIMPLE, SENTINEL NODE, COMBINED Right 7/27/2015    Procedure: COMBINED  MASTECTOMY SIMPLE, SENTINEL NODE;  Surgeon: Aliyah Garsia MD;  Location: [facility]     ORTHOPEDIC SURGERY      bilateral knee replacements       Social History     Tobacco Use     Smoking status: Former Smoker     Packs/day: 0.50     Years: 47.00     Pack years: 23.50     Types: Cigarettes     Start date: 1948     Last attempt to quit: 1995     Years since quittin.5     Smokeless tobacco: Never Used   Substance Use Topics     Alcohol use: No     Comment: former     Family History   Problem Relation Age of Onset     Cancer - colorectal Mother      Eye Disorder Mother         cataracts     Musculoskeletal Disorder Mother         CTS     Colon Cancer Mother      Other Cancer Mother      Cancer Father         lung cancer     Genitourinary Problems Father         prostate problems     Other Cancer Father         lung     C.A.D. Brother         CABG     Eye Disorder Brother         cataracts     Breast Cancer Other      Ovarian Cancer Other      Pancreatic Cancer Other      Diabetes No family hx of      Coronary Artery Disease No family hx of      Cerebrovascular Disease No family hx of      Hyperlipidemia No family hx of      Hypertension No family hx of      Prostate Cancer No family hx of      Thyroid Disease No family hx of      Genetic Disorder No family hx of      Asthma No family hx of      Anesthesia Reaction No family hx of          Current Outpatient Medications   Medication Sig Dispense Refill     acetaminophen (TYLENOL) 325 MG tablet Take 325-650 mg by mouth every 6 hours as needed for mild pain       ALPRAZolam (XANAX) 0.5 MG tablet TAKE 1 TO 2 TABLETS BY MOUTH DAILY AT BEDTIME AS NEEDED 180 tablet 0     Ascorbic Acid (VITAMIN C PO) Take 500 mg by mouth daily       atenolol (TENORMIN) 50 MG tablet TAKE 1 TABLET BY MOUTH DAILY 90 tablet 1     gabapentin (NEURONTIN) 300 MG capsule TAKE 1 CAPSULE BY MOUTH 2 TIMES A  capsule 0     levothyroxine (SYNTHROID/LEVOTHROID) 75 MCG tablet TAKE 1  "TABLET BY MOUTH DAILY 90 tablet 0     omeprazole (PRILOSEC) 20 MG DR capsule TAKE 1 CAPSULE BY MOUTH DAILY 90 capsule 1     order for DME Equipment being ordered: right hand compression glove 1 each 0     pravastatin (PRAVACHOL) 40 MG tablet TAKE 1 TABLET BY MOUTH EVERY MORNING AND 1 EVERY OTHER EVENING 135 tablet 0     sennosides (SENOKOT) 8.6 MG tablet Take 2 tablets by mouth At Bedtime Reported on 5/17/2017       tamoxifen (NOLVADEX) 20 MG tablet TAKE 1 TABLET BY MOUTH DAILY 90 tablet 0     tamsulosin (FLOMAX) 0.4 MG capsule TAKE 1 CAPSULE BY MOUTH EVERY MORNING AND 1 CAPSULE AT NIGHT 180 capsule 1     triamcinolone (KENALOG) 0.5 % external cream APPLY SPARINGLY TO AFFECTED AREA 3 TIMES A DAY 30 g 0     VENTOLIN  (90 BASE) MCG/ACT inhaler USE 2 PUFFS EVERY 6 HOURS AS NEEDED FOR SHORTNESS OF BREATH/DYSPNEA 18 each 1     No Known Allergies    PROBLEMS TO ADD ON...  Reviewed and updated as needed this visit by Provider         Review of Systems   Constitutional, HEENT, cardiovascular, pulmonary, gi and gu systems are negative, except as otherwise noted.      Objective    /58   Pulse 70   Temp 97.8  F (36.6  C)   Ht 1.702 m (5' 7\")   Wt 79.4 kg (175 lb)   SpO2 96%   BMI 27.41 kg/m    Body mass index is 27.41 kg/m .  Physical Exam   GENERAL: healthy, alert and no distress  NECK: no adenopathy, no asymmetry, masses, or scars and thyroid normal to palpation  RESP: lungs clear to auscultation - no rales, rhonchi or wheezes  CV: regular rate and rhythm, normal S1 S2, no S3 or S4, no murmur, click or rub, no peripheral edema and peripheral pulses strong  ABDOMEN: soft, nontender, no hepatosplenomegaly, no masses and bowel sounds normal  MS: edema in the right hand.  Pain with any shoulder abduction.     Diagnostic Test Results:  Labs reviewed in Epic        Assessment & Plan       ICD-10-CM    1. Hypothyroidism, unspecified type  E03.9 TSH with free T4 reflex   2. Hyperlipidemia LDL goal <130  E78.5 " "Comprehensive metabolic panel     Lipid Profile   3. Prediabetes  R73.03    4. Anxiety  F41.9    5. Gastroesophageal reflux disease without esophagitis  K21.9    6. Chronic right shoulder pain  M25.511 Large Joint/Bursa injection and/or drainage (Shoulder, Knee)    G89.29 triamcinolone (KENALOG-40) injection 80 mg     lidocaine 1 % injection 5 mL     All stable doing great.  Update labs.  Shot done today.  F/u routine q 6 months.  Reliable use of xanax without side effects and I am never going to stop that on him unless we have to (falls, etc)  .  Has been on it for years.  F/u routine.    PROCEDURE:  JOINT ASPIRATION/INJECTION.         After a discussion of risks, benefits and side effects of procedure, informed patient consent was obtained.       The right subacromial bursa was prepped and draped in the usual clean fashion (sterile not required for this procedure).      ASPIRATION: Not performed.                               INJECTION:  Using 4 cc of 1% lidocaine mixed                           with 80 mg of triamcinolone, the right subacromial bursa was successfully injected                          without complication.  Patient did experience some pain                          relief following injection.       BMI:   Estimated body mass index is 27.41 kg/m  as calculated from the following:    Height as of this encounter: 1.702 m (5' 7\").    Weight as of this encounter: 79.4 kg (175 lb).               No follow-ups on file.    Rubén Wren MD  [facility]      "

## 2020-07-15 ENCOUNTER — OFFICE VISIT (OUTPATIENT)
Dept: FAMILY MEDICINE | Facility: OTHER | Age: 85
End: 2020-07-15
Attending: FAMILY MEDICINE
Payer: MEDICARE

## 2020-07-15 VITALS
SYSTOLIC BLOOD PRESSURE: 126 MMHG | WEIGHT: 175 LBS | DIASTOLIC BLOOD PRESSURE: 58 MMHG | OXYGEN SATURATION: 96 % | HEIGHT: 67 IN | TEMPERATURE: 97.8 F | HEART RATE: 70 BPM | BODY MASS INDEX: 27.47 KG/M2

## 2020-07-15 DIAGNOSIS — F41.9 ANXIETY: ICD-10-CM

## 2020-07-15 DIAGNOSIS — G89.29 CHRONIC RIGHT SHOULDER PAIN: ICD-10-CM

## 2020-07-15 DIAGNOSIS — K21.9 GASTROESOPHAGEAL REFLUX DISEASE WITHOUT ESOPHAGITIS: ICD-10-CM

## 2020-07-15 DIAGNOSIS — E78.5 HYPERLIPIDEMIA LDL GOAL <130: ICD-10-CM

## 2020-07-15 DIAGNOSIS — E03.9 HYPOTHYROIDISM, UNSPECIFIED TYPE: ICD-10-CM

## 2020-07-15 DIAGNOSIS — E03.9 HYPOTHYROIDISM, UNSPECIFIED TYPE: Primary | ICD-10-CM

## 2020-07-15 DIAGNOSIS — M25.511 CHRONIC RIGHT SHOULDER PAIN: ICD-10-CM

## 2020-07-15 DIAGNOSIS — R73.03 PREDIABETES: ICD-10-CM

## 2020-07-15 LAB
ALBUMIN SERPL-MCNC: 3.6 G/DL (ref 3.4–5)
ALP SERPL-CCNC: 67 U/L (ref 40–150)
ALT SERPL W P-5'-P-CCNC: 21 U/L (ref 0–70)
ANION GAP SERPL CALCULATED.3IONS-SCNC: 6 MMOL/L (ref 3–14)
AST SERPL W P-5'-P-CCNC: 23 U/L (ref 0–45)
BILIRUB SERPL-MCNC: 0.4 MG/DL (ref 0.2–1.3)
BUN SERPL-MCNC: 19 MG/DL (ref 7–30)
CALCIUM SERPL-MCNC: 8.7 MG/DL (ref 8.5–10.1)
CHLORIDE SERPL-SCNC: 110 MMOL/L (ref 94–109)
CHOLEST SERPL-MCNC: 144 MG/DL
CO2 SERPL-SCNC: 24 MMOL/L (ref 20–32)
CREAT SERPL-MCNC: 1.16 MG/DL (ref 0.66–1.25)
GFR SERPL CREATININE-BSD FRML MDRD: 57 ML/MIN/{1.73_M2}
GLUCOSE SERPL-MCNC: 84 MG/DL (ref 70–99)
HDLC SERPL-MCNC: 49 MG/DL
LDLC SERPL CALC-MCNC: 63 MG/DL
NONHDLC SERPL-MCNC: 95 MG/DL
POTASSIUM SERPL-SCNC: 4.1 MMOL/L (ref 3.4–5.3)
PROT SERPL-MCNC: 7.3 G/DL (ref 6.8–8.8)
SODIUM SERPL-SCNC: 140 MMOL/L (ref 133–144)
T4 FREE SERPL-MCNC: 0.95 NG/DL (ref 0.76–1.46)
TRIGL SERPL-MCNC: 158 MG/DL
TSH SERPL DL<=0.005 MIU/L-ACNC: 4.56 MU/L (ref 0.4–4)

## 2020-07-15 PROCEDURE — 80053 COMPREHEN METABOLIC PANEL: CPT | Mod: ZL | Performed by: FAMILY MEDICINE

## 2020-07-15 PROCEDURE — 80061 LIPID PANEL: CPT | Mod: ZL | Performed by: FAMILY MEDICINE

## 2020-07-15 PROCEDURE — G0463 HOSPITAL OUTPT CLINIC VISIT: HCPCS | Mod: 25

## 2020-07-15 PROCEDURE — 36415 COLL VENOUS BLD VENIPUNCTURE: CPT | Mod: ZL | Performed by: FAMILY MEDICINE

## 2020-07-15 PROCEDURE — 99214 OFFICE O/P EST MOD 30 MIN: CPT | Mod: 25 | Performed by: FAMILY MEDICINE

## 2020-07-15 PROCEDURE — 84443 ASSAY THYROID STIM HORMONE: CPT | Mod: ZL | Performed by: FAMILY MEDICINE

## 2020-07-15 PROCEDURE — 84439 ASSAY OF FREE THYROXINE: CPT | Mod: ZL | Performed by: FAMILY MEDICINE

## 2020-07-15 PROCEDURE — 20610 DRAIN/INJ JOINT/BURSA W/O US: CPT | Performed by: FAMILY MEDICINE

## 2020-07-15 RX ORDER — LIDOCAINE HYDROCHLORIDE 10 MG/ML
5 INJECTION, SOLUTION INFILTRATION; PERINEURAL ONCE
Status: COMPLETED | OUTPATIENT
Start: 2020-07-15 | End: 2020-07-15

## 2020-07-15 RX ORDER — LEVOTHYROXINE SODIUM 88 UG/1
88 TABLET ORAL DAILY
Qty: 90 TABLET | Refills: 0 | OUTPATIENT
Start: 2020-07-15

## 2020-07-15 RX ORDER — TRIAMCINOLONE ACETONIDE 40 MG/ML
80 INJECTION, SUSPENSION INTRA-ARTICULAR; INTRAMUSCULAR ONCE
Status: COMPLETED | OUTPATIENT
Start: 2020-07-15 | End: 2020-07-15

## 2020-07-15 RX ADMIN — LIDOCAINE HYDROCHLORIDE 5 ML: 10 INJECTION, SOLUTION INFILTRATION; PERINEURAL at 12:00

## 2020-07-15 RX ADMIN — TRIAMCINOLONE ACETONIDE 80 MG: 40 INJECTION, SUSPENSION INTRA-ARTICULAR; INTRAMUSCULAR at 11:56

## 2020-07-15 ASSESSMENT — ANXIETY QUESTIONNAIRES
2. NOT BEING ABLE TO STOP OR CONTROL WORRYING: NOT AT ALL
5. BEING SO RESTLESS THAT IT IS HARD TO SIT STILL: NOT AT ALL
3. WORRYING TOO MUCH ABOUT DIFFERENT THINGS: NOT AT ALL
1. FEELING NERVOUS, ANXIOUS, OR ON EDGE: NOT AT ALL
7. FEELING AFRAID AS IF SOMETHING AWFUL MIGHT HAPPEN: NOT AT ALL
GAD7 TOTAL SCORE: 0
6. BECOMING EASILY ANNOYED OR IRRITABLE: NOT AT ALL

## 2020-07-15 ASSESSMENT — PATIENT HEALTH QUESTIONNAIRE - PHQ9
SUM OF ALL RESPONSES TO PHQ QUESTIONS 1-9: 1
5. POOR APPETITE OR OVEREATING: NOT AT ALL

## 2020-07-15 ASSESSMENT — PAIN SCALES - GENERAL: PAINLEVEL: NO PAIN (0)

## 2020-07-15 ASSESSMENT — MIFFLIN-ST. JEOR: SCORE: 1432.42

## 2020-07-15 NOTE — NURSING NOTE
"Chief Complaint   Patient presents with     Hypertension     Lipids     Thyroid Problem       Initial /58   Pulse 70   Temp 97.8  F (36.6  C)   Ht 1.702 m (5' 7\")   Wt 79.4 kg (175 lb)   SpO2 96%   BMI 27.41 kg/m   Estimated body mass index is 27.41 kg/m  as calculated from the following:    Height as of this encounter: 1.702 m (5' 7\").    Weight as of this encounter: 79.4 kg (175 lb).  Medication Reconciliation: complete  Tonia Noriega, LPN  "

## 2020-07-15 NOTE — LETTER
July 15, 2020      Lio Daly  [address] [facility] [address]  [place] [address]        Dear ,    We are writing to inform you of your test results.    Continue taking levothyroxine 75 mcg and recheck your labs in 2 months.    Resulted Orders   Comprehensive metabolic panel   Result Value Ref Range    Sodium 140 133 - 144 mmol/L    Potassium 4.1 3.4 - 5.3 mmol/L    Chloride 110 (H) 94 - 109 mmol/L    Carbon Dioxide 24 20 - 32 mmol/L    Anion Gap 6 3 - 14 mmol/L    Glucose 84 70 - 99 mg/dL      Comment:      Fasting specimen    Urea Nitrogen 19 7 - 30 mg/dL    Creatinine 1.16 0.66 - 1.25 mg/dL    GFR Estimate 57 (L) >60 mL/min/[1.73_m2]      Comment:      Non  GFR Calc  Starting 12/18/2018, serum creatinine based estimated GFR (eGFR) will be   calculated using the Chronic Kidney Disease Epidemiology Collaboration   (CKD-EPI) equation.      GFR Estimate If Black 66 >60 mL/min/[1.73_m2]      Comment:       GFR Calc  Starting 12/18/2018, serum creatinine based estimated GFR (eGFR) will be   calculated using the Chronic Kidney Disease Epidemiology Collaboration   (CKD-EPI) equation.      Calcium 8.7 8.5 - 10.1 mg/dL    Bilirubin Total 0.4 0.2 - 1.3 mg/dL    Albumin 3.6 3.4 - 5.0 g/dL    Protein Total 7.3 6.8 - 8.8 g/dL    Alkaline Phosphatase 67 40 - 150 U/L    ALT 21 0 - 70 U/L    AST 23 0 - 45 U/L   Lipid Profile   Result Value Ref Range    Cholesterol 144 <200 mg/dL    Triglycerides 158 (H) <150 mg/dL      Comment:      Borderline high:  150-199 mg/dl  High:             200-499 mg/dl  Very high:       >499 mg/dl  Fasting specimen      HDL Cholesterol 49 >39 mg/dL    LDL Cholesterol Calculated 63 <100 mg/dL      Comment:      Desirable:       <100 mg/dl    Non HDL Cholesterol 95 <130 mg/dL   TSH with free T4 reflex   Result Value Ref Range    TSH 4.56 (H) 0.40 - 4.00 mU/L       If you have any questions or concerns, please call the clinic at the number listed above.        Sincerely,        Rubén Wren MD

## 2020-07-15 NOTE — TELEPHONE ENCOUNTER
Given that just stay on the 75, and get the lab rechecked in 2 months here.  I didn't send the 88 mcg but did sign the lab.  Thanks.  .  Rubén Wren MD

## 2020-07-15 NOTE — TELEPHONE ENCOUNTER
Pt states he taking 50 mcg x 3 weeks as he was out of 75 mcg.  Rx pended if you still want to increase.

## 2020-07-15 NOTE — NURSING NOTE
Prior to injection, verified patient identity using patient's name and date of birth.      Joint injection was performed.   right shoulder    Kenalog  Drug Amount Wasted:  Yes: 3 mg/ml   Vial/Syringe: Single dose vial  Expiration Date:  1/2021    Lidocaine  Drug Amount Wasted:  None.  Vial/Syringe: Single dose vial  Expiration Date:  1/2023  Tonia Noriega

## 2020-07-16 ASSESSMENT — ANXIETY QUESTIONNAIRES: GAD7 TOTAL SCORE: 0

## 2020-07-24 DIAGNOSIS — M54.16 LUMBAR RADICULOPATHY: ICD-10-CM

## 2020-07-24 DIAGNOSIS — C50.911 PRIMARY BREAST MALIGNANCY, RIGHT (H): ICD-10-CM

## 2020-07-28 RX ORDER — GABAPENTIN 300 MG/1
CAPSULE ORAL
Qty: 180 CAPSULE | Refills: 0 | Status: SHIPPED | OUTPATIENT
Start: 2020-07-28 | End: 2020-10-27

## 2020-07-28 RX ORDER — TAMOXIFEN CITRATE 20 MG/1
TABLET ORAL
Qty: 90 TABLET | Refills: 0 | Status: SHIPPED | OUTPATIENT
Start: 2020-07-28 | End: 2020-10-27

## 2020-07-28 NOTE — TELEPHONE ENCOUNTER
Pt of Dr.Thompson Gomez  Last Written Prescription Date:  4.27.2020  Last Fill Quantity: 180,   # refills: 0  Last Office Visit: 7.15.2020  Future Office visit:       Routing refill request to provider for review/approval because:  Drug not on the [facility], [facility] or [facility] refill protocol or controlled substance      Tamoxifen  Last Written Prescription Date:  4.27.2020  Last Fill Quantity: 90,   # refills: 0  Last Office Visit:   Future Office visit:       Routing refill request to provider for review/approval because:  Drug not on the [facility], [facility] or [facility] refill protocol or controlled substance      Pended.      Janina Au RN

## 2020-08-11 DIAGNOSIS — E78.5 HYPERLIPIDEMIA LDL GOAL <130: ICD-10-CM

## 2020-08-11 DIAGNOSIS — F41.9 ANXIETY: ICD-10-CM

## 2020-08-12 RX ORDER — ALPRAZOLAM 0.5 MG
TABLET ORAL
Qty: 60 TABLET | Refills: 0 | Status: SHIPPED | OUTPATIENT
Start: 2020-08-12 | End: 2020-09-24

## 2020-08-12 RX ORDER — PRAVASTATIN SODIUM 40 MG
TABLET ORAL
Qty: 135 TABLET | Refills: 3 | Status: SHIPPED | OUTPATIENT
Start: 2020-08-12 | End: 2021-09-08

## 2020-09-23 DIAGNOSIS — E03.9 HYPOTHYROIDISM, UNSPECIFIED TYPE: ICD-10-CM

## 2020-09-23 DIAGNOSIS — N40.0 BENIGN PROSTATIC HYPERPLASIA, UNSPECIFIED WHETHER LOWER URINARY TRACT SYMPTOMS PRESENT: ICD-10-CM

## 2020-09-23 DIAGNOSIS — K21.9 GASTROESOPHAGEAL REFLUX DISEASE, ESOPHAGITIS PRESENCE NOT SPECIFIED: ICD-10-CM

## 2020-09-23 DIAGNOSIS — F41.9 ANXIETY: ICD-10-CM

## 2020-09-23 DIAGNOSIS — I10 HTN, GOAL BELOW 140/80: ICD-10-CM

## 2020-09-24 ENCOUNTER — ALLIED HEALTH/NURSE VISIT (OUTPATIENT)
Dept: FAMILY MEDICINE | Facility: OTHER | Age: 85
End: 2020-09-24
Attending: FAMILY MEDICINE
Payer: MEDICARE

## 2020-09-24 DIAGNOSIS — E03.9 HYPOTHYROIDISM, UNSPECIFIED TYPE: ICD-10-CM

## 2020-09-24 DIAGNOSIS — Z23 NEED FOR PROPHYLACTIC VACCINATION AND INOCULATION AGAINST INFLUENZA: Primary | ICD-10-CM

## 2020-09-24 LAB — TSH SERPL DL<=0.005 MIU/L-ACNC: 3.48 MU/L (ref 0.4–4)

## 2020-09-24 PROCEDURE — 36415 COLL VENOUS BLD VENIPUNCTURE: CPT | Mod: ZL | Performed by: FAMILY MEDICINE

## 2020-09-24 PROCEDURE — 90471 IMMUNIZATION ADMIN: CPT

## 2020-09-24 PROCEDURE — 84443 ASSAY THYROID STIM HORMONE: CPT | Mod: ZL | Performed by: FAMILY MEDICINE

## 2020-09-24 PROCEDURE — 90662 IIV NO PRSV INCREASED AG IM: CPT

## 2020-09-24 RX ORDER — LEVOTHYROXINE SODIUM 75 UG/1
TABLET ORAL
Qty: 90 TABLET | Refills: 3 | Status: SHIPPED | OUTPATIENT
Start: 2020-09-24 | End: 2021-09-08

## 2020-09-24 RX ORDER — ATENOLOL 50 MG/1
TABLET ORAL
Qty: 90 TABLET | Refills: 3 | Status: SHIPPED | OUTPATIENT
Start: 2020-09-24 | End: 2021-09-08

## 2020-09-24 RX ORDER — TAMSULOSIN HYDROCHLORIDE 0.4 MG/1
CAPSULE ORAL
Qty: 180 CAPSULE | Refills: 3 | Status: SHIPPED | OUTPATIENT
Start: 2020-09-24 | End: 2021-09-08

## 2020-09-24 RX ORDER — ALPRAZOLAM 0.5 MG
TABLET ORAL
Qty: 60 TABLET | Refills: 0 | Status: SHIPPED | OUTPATIENT
Start: 2020-09-24 | End: 2020-10-27

## 2020-10-26 ENCOUNTER — OFFICE VISIT (OUTPATIENT)
Dept: FAMILY MEDICINE | Facility: OTHER | Age: 85
End: 2020-10-26
Attending: FAMILY MEDICINE
Payer: MEDICARE

## 2020-10-26 ENCOUNTER — TELEPHONE (OUTPATIENT)
Dept: FAMILY MEDICINE | Facility: OTHER | Age: 85
End: 2020-10-26

## 2020-10-26 VITALS
HEART RATE: 68 BPM | OXYGEN SATURATION: 97 % | WEIGHT: 177 LBS | DIASTOLIC BLOOD PRESSURE: 62 MMHG | BODY MASS INDEX: 27.72 KG/M2 | SYSTOLIC BLOOD PRESSURE: 118 MMHG

## 2020-10-26 DIAGNOSIS — M25.511 CHRONIC RIGHT SHOULDER PAIN: Primary | ICD-10-CM

## 2020-10-26 DIAGNOSIS — G89.29 CHRONIC RIGHT SHOULDER PAIN: Primary | ICD-10-CM

## 2020-10-26 PROCEDURE — 20610 DRAIN/INJ JOINT/BURSA W/O US: CPT | Performed by: FAMILY MEDICINE

## 2020-10-26 PROCEDURE — 99213 OFFICE O/P EST LOW 20 MIN: CPT | Mod: 25 | Performed by: FAMILY MEDICINE

## 2020-10-26 PROCEDURE — G0463 HOSPITAL OUTPT CLINIC VISIT: HCPCS | Mod: 25

## 2020-10-26 RX ORDER — LIDOCAINE HYDROCHLORIDE 10 MG/ML
5 INJECTION, SOLUTION INFILTRATION; PERINEURAL ONCE
Status: COMPLETED | OUTPATIENT
Start: 2020-10-26 | End: 2020-10-26

## 2020-10-26 RX ORDER — TRIAMCINOLONE ACETONIDE 40 MG/ML
80 INJECTION, SUSPENSION INTRA-ARTICULAR; INTRAMUSCULAR ONCE
Status: COMPLETED | OUTPATIENT
Start: 2020-10-26 | End: 2020-10-26

## 2020-10-26 RX ADMIN — LIDOCAINE HYDROCHLORIDE 5 ML: 10 INJECTION, SOLUTION INFILTRATION; PERINEURAL at 12:10

## 2020-10-26 RX ADMIN — TRIAMCINOLONE ACETONIDE 80 MG: 40 INJECTION, SUSPENSION INTRA-ARTICULAR; INTRAMUSCULAR at 12:12

## 2020-10-26 ASSESSMENT — PAIN SCALES - GENERAL: PAINLEVEL: SEVERE PAIN (7)

## 2020-10-26 NOTE — PROGRESS NOTES
Clinic Administered Medication Documentation      Injection Documentation     Injection was administered by provider (please see MAR for given by information). Please see MAR and medication order for additional information.     Site: Joint injection   Medication Used: Kenalog 2 mL & Xylocaine 5 mL    Expiration Date:  03/30/2021

## 2020-10-26 NOTE — NURSING NOTE
"Chief Complaint   Patient presents with     Musculoskeletal Problem       Initial /62   Pulse 68   Wt 80.3 kg (177 lb)   SpO2 97%   BMI 27.72 kg/m   Estimated body mass index is 27.72 kg/m  as calculated from the following:    Height as of 7/15/20: 1.702 m (5' 7\").    Weight as of this encounter: 80.3 kg (177 lb).  Medication Reconciliation: complete  Tonia Noriega LPN  "

## 2020-10-26 NOTE — PROGRESS NOTES
"Subjective     Lio aDly is a 86 year old male who presents to clinic today for the following health issues:    HPI         Musculoskeletal problem/pain      Duration: 2-3 weeks     Description  Location: right shoulder    Intensity:  moderate    Accompanying signs and symptoms: none    History  Previous similar problem: YES  Previous evaluation:  x-ray    Precipitating or alleviating factors:  Trauma or overuse: no   Aggravating factors include: overuse    Therapies tried and outcome: pt is requesting a steroid injection       .    Review of Systems   Constitutional, HEENT, cardiovascular, pulmonary, gi and gu systems are negative, except as otherwise noted.      Objective    /62   Pulse 68   Wt 80.3 kg (177 lb)   SpO2 97%   BMI 27.72 kg/m    Body mass index is 27.72 kg/m .  Physical Exam   GENERAL: healthy, alert and no distress  MS: pain with any abduction right shoulder.   SKIN: no suspicious lesions or rashes            Assessment & Plan     Chronic right shoulder pain  Reviewed.  Injected.  F/u routine.  These have had modest success in him.  We simply did it again.  F/u routine.    - Large Joint/Bursa injection and/or drainage (Shoulder, Knee)  - triamcinolone (KENALOG-40) injection 80 mg  - lidocaine 1 % injection 5 mL     BMI:   Estimated body mass index is 27.72 kg/m  as calculated from the following:    Height as of 7/15/20: 1.702 m (5' 7\").    Weight as of this encounter: 80.3 kg (177 lb).            PROCEDURE:  JOINT ASPIRATION/INJECTION.         After a discussion of risks, benefits and side effects of procedure, informed patient consent was obtained.       The right subacromial bursa was prepped and draped in the usual clean fashion (sterile not required for this procedure).      ASPIRATION: Not performed.                               INJECTION:  Using 4 cc of 1% lidocaine mixed                           with 80 mg of triamcinolone, the right subacromial bursa was successfully " injected                          without complication.  Patient did experience some pain                          relief following injection.      No follow-ups on file.    Rubén Wren MD  [facility]

## 2020-10-26 NOTE — TELEPHONE ENCOUNTER
Needs a cortisone shot in his shoulder, wants to have to day states cannot make it much longer. Please call back to advise [phone]

## 2020-10-27 DIAGNOSIS — M54.16 LUMBAR RADICULOPATHY: ICD-10-CM

## 2020-10-27 DIAGNOSIS — C50.911 PRIMARY BREAST MALIGNANCY, RIGHT (H): ICD-10-CM

## 2020-10-27 DIAGNOSIS — F41.9 ANXIETY: ICD-10-CM

## 2020-10-27 RX ORDER — TAMOXIFEN CITRATE 20 MG/1
TABLET ORAL
Qty: 90 TABLET | Refills: 3 | Status: SHIPPED | OUTPATIENT
Start: 2020-10-27 | End: 2021-10-20

## 2020-10-27 RX ORDER — GABAPENTIN 300 MG/1
CAPSULE ORAL
Qty: 180 CAPSULE | Refills: 3 | Status: SHIPPED | OUTPATIENT
Start: 2020-10-27 | End: 2021-10-20

## 2020-10-27 RX ORDER — ALPRAZOLAM 0.5 MG
TABLET ORAL
Qty: 60 TABLET | Refills: 0 | Status: SHIPPED | OUTPATIENT
Start: 2020-10-27 | End: 2020-12-16

## 2020-11-22 NOTE — PROGRESS NOTES
"Lio Daly is a 86 year old male who is being evaluated via a billable telephone visit.      The patient has been notified of following:     \"This telephone visit will be conducted via a call between you and your physician/provider. We have found that certain health care needs can be provided without the need for a physical exam.  This service lets us provide the care you need with a short phone conversation.  If a prescription is necessary we can send it directly to your pharmacy.  If lab work is needed we can place an order for that and you can then stop by our lab to have the test done at a later time.    Telephone visits are billed at different rates depending on your insurance coverage. During this emergency period, for some insurers they may be billed the same as an in-person visit.  Please reach out to your insurance provider with any questions.    If during the course of the call the physician/provider feels a telephone visit is not appropriate, you will not be charged for this service.\"    Patient has given verbal consent for Telephone visit?  Yes    What phone number would you like to be contacted at? [phone]    How would you like to obtain your AVS? Mail a copy    Oncology Follow-up Visit:  November 24, 2020    Reason for Visit:  Patient presents with:  RECHECK: Follow up Malignant neoplasm of right breast     Nursing Note and documentation reviewed: yes    HPI:   This is an 86-year-old male patient who is undergoing a telephone visit today (he patient declined inpatient visit due to Covid pandemic and declined lab work) in follow up of right breast cancer.  Patient underwent a right mastectomy with axillary node dissection in July 2015. He was placed on 20 mg of tamoxifen and remains on this.       Patient states that he is doing well.  He offers no complaints.  He had a right shoulder injection in October and states this worked well.  He feels his lymphedema is much improved in his right " upper extremities and states he does not use a sleeve or his glove anymore.  He does elevate his arm at night.  He denies any lumps or concerns to the chest wall and denies any side effects on the tamoxifen.    Oncologic History:     Patient had presented with complaints of right nipple irritation over a six-month period and was noted to have a mass in the right breast. Mammogram was completed followed by biopsy and pathology showed an invasive carcinoma. On 7/27/2015, he underwent a right modified radical mastectomy with axillary node dissection by Dr. Garsia. 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 DCIS was present, grade 2 with expansive comedonecrosis. Node dissection revealed 1/17 nodes revealing micrometastasis. Tumor was ER/KY positive, HER-2/thomas negative. Patient was staged pathologic T1c N1mi M0.      At consultation, patient was not interested in adjuvant chemotherapy and was placed on tamoxifen 20 mg daily.      BRCA 2+      Current Chemo Regime/TX: Tamoxifen 20mg daily initiated 8/2015  Current Cycle: n/a  # of completed cycles: n/a      Previous treatment:  n/a     Past Medical History:   Diagnosis Date     Arthritis      Cancer (H)      Dysphagia 06/28/2004     HTN (hypertension)      Need for prophylactic vaccination and inoculation 12/10/2001     Need for prophylactic vaccination and inoculation, 11/06/2003     Other and unspecified hyperlipidemia 05/01/2001     Prediabetes      Shortness of breath 03/28/2006     Shoulder pain 06/15/2012     Thyroid disease      Unspecified essential hypertension 05/18/2000       Past Surgical History:   Procedure Laterality Date     CARDIAC SURGERY      angiogram 1992 [facility]     COLONOSCOPY      1998     GENITOURINARY SURGERY      prostate bx [facility]     GENITOURINARY SURGERY      Prostate bx [facility]     GI SURGERY      EGD     GI SURGERY       Pt has EGD with removal of food from esophagus     MASTECTOMY SIMPLE, SENTINEL NODE, COMBINED Right 2015    Procedure: COMBINED MASTECTOMY SIMPLE, SENTINEL NODE;  Surgeon: Aliyah Garsia MD;  Location: [facility]     ORTHOPEDIC SURGERY      bilateral knee replacements       Family History   Problem Relation Age of Onset     Cancer - colorectal Mother      Eye Disorder Mother         cataracts     Musculoskeletal Disorder Mother         CTS     Colon Cancer Mother      Other Cancer Mother      Cancer Father         lung cancer     Genitourinary Problems Father         prostate problems     Other Cancer Father         lung     C.A.D. Brother         CABG     Eye Disorder Brother         cataracts     Breast Cancer Other      Ovarian Cancer Other      Pancreatic Cancer Other      Diabetes No family hx of      Coronary Artery Disease No family hx of      Cerebrovascular Disease No family hx of      Hyperlipidemia No family hx of      Hypertension No family hx of      Prostate Cancer No family hx of      Thyroid Disease No family hx of      Genetic Disorder No family hx of      Asthma No family hx of      Anesthesia Reaction No family hx of        Social History     Socioeconomic History     Marital status:      Spouse name: Marisa     Number of children: Not on file     Years of education: Not on file     Highest education level: Not on file   Occupational History     Not on file   Social Needs     Financial resource strain: Not on file     Food insecurity     Worry: Not on file     Inability: Not on file     Transportation needs     Medical: Not on file     Non-medical: Not on file   Tobacco Use     Smoking status: Former Smoker     Packs/day: 0.50     Years: 47.00     Pack years: 23.50     Types: Cigarettes     Start date: 1948     Quit date: 1995     Years since quittin.8     Smokeless tobacco: Never Used   Substance and Sexual Activity     Alcohol use: No     Comment: former     Drug use:  No     Sexual activity: Not on file   Lifestyle     Physical activity     Days per week: Not on file     Minutes per session: Not on file     Stress: Not on file   Relationships     Social connections     Talks on phone: Not on file     Gets together: Not on file     Attends Congregational service: Not on file     Active member of club or organization: Not on file     Attends meetings of clubs or organizations: Not on file     Relationship status: Not on file     Intimate partner violence     Fear of current or ex partner: Not on file     Emotionally abused: Not on file     Physically abused: Not on file     Forced sexual activity: Not on file   Other Topics Concern      Service Not Asked     Blood Transfusions Not Asked     Caffeine Concern Yes     Comment: coffee, 2 cups daily     Occupational Exposure Not Asked     Hobby Hazards Not Asked     Sleep Concern Not Asked     Stress Concern Not Asked     Weight Concern Not Asked     Special Diet Not Asked     Back Care Not Asked     Exercise Not Asked     Bike Helmet Not Asked     Seat Belt Not Asked     Self-Exams Not Asked     Parent/sibling w/ CABG, MI or angioplasty before 65F 55M? No   Social History Narrative     Not on file       Current Outpatient Medications   Medication     acetaminophen (TYLENOL) 325 MG tablet     ALPRAZolam (XANAX) 0.5 MG tablet     Ascorbic Acid (VITAMIN C PO)     atenolol (TENORMIN) 50 MG tablet     gabapentin (NEURONTIN) 300 MG capsule     levothyroxine (SYNTHROID/LEVOTHROID) 75 MCG tablet     omeprazole (PRILOSEC) 20 MG DR capsule     order for DME     pravastatin (PRAVACHOL) 40 MG tablet     sennosides (SENOKOT) 8.6 MG tablet     tamoxifen (NOLVADEX) 20 MG tablet     tamsulosin (FLOMAX) 0.4 MG capsule     triamcinolone (KENALOG) 0.5 % external cream     VENTOLIN  (90 BASE) MCG/ACT inhaler     No current facility-administered medications for this visit.         No Known Allergies    Review Of Systems:  Constitutional:     denies fever, weight changes, chills, and night sweats.  Eyes:    denies blurred or double vision  Ears/Nose/Throat:   denies ear pain, nose problems, difficulty swallowing  Respiratory:   denies shortness of breath, cough   Skin:   denies rash, lesions  Breast/Chest wall:   denies pain, lumps  Cardiovascular:   denies chest pain, palpitations, edema  Gastrointestinal:   denies abdominal pain, bloating, nausea, early satiety; no change in bowel habits or blood in stool  Genitourinary:   denies difficulty with urination, blood in urine  Musculoskeletal:    denies new muscle pain, bone pain  Neurologic:   denies lightheadedness, headaches  Psychiatric:   denies anxiety, depression  Hematologic/Lymphatic/Immunologic:   denies easy bruising, easy bleeding, lumps or bumps noted  Endocrine:   Denies increased thirst      Physical Exam: No Physical exam today/telephone visit per patient request    PSYCHIATRIC: Mentation  normal.  Mood appropriate.    Laboratory: None completed for today's visit per patient request    Imaging Studies:  None for today's visit      ASSESSMENT/PLAN:    #1  Breast cancer:  Diagnosed with Stage 1, pT1c N1mi M0 carcinoma of the right breast in July 2015.  He underwent mastectomy with node dissection with 1/17 nodes revealing micrometastases; tumor was 2cm, ER/NH positive, HER-2/thomas negative.  BRCA2 positive.  He'll continue with tamoxifen 20 mg daily and follow up in 1 year with a CBC, CMP.     I encouraged patient to call with any questions or concerns.     Phone call duration: 12 minutes    Radha Sales NP  APRN, FNP-BC, AOCNP

## 2020-11-23 NOTE — NURSING NOTE
"Chief Complaint   Patient presents with     RECHECK     Follow up Malignant neoplasm of right breast       Initial There were no vitals taken for this visit. Estimated body mass index is 27.72 kg/m  as calculated from the following:    Height as of 7/15/20: 1.702 m (5' 7\").    Weight as of 10/26/20: 80.3 kg (177 lb).  Medication Reconciliation: complete.  Immunizations and advance directives status reviewed. Pain scale =0 , PHQ-2=0.            Brianna Alexandra LPN    "

## 2020-11-24 ENCOUNTER — VIRTUAL VISIT (OUTPATIENT)
Dept: ONCOLOGY | Facility: OTHER | Age: 85
End: 2020-11-24
Attending: NURSE PRACTITIONER
Payer: COMMERCIAL

## 2020-11-24 DIAGNOSIS — C50.921 MALIGNANT NEOPLASM OF RIGHT MALE BREAST, UNSPECIFIED ESTROGEN RECEPTOR STATUS, UNSPECIFIED SITE OF BREAST (H): Primary | ICD-10-CM

## 2020-11-24 DIAGNOSIS — Z15.09 BRCA2 POSITIVE: ICD-10-CM

## 2020-11-24 DIAGNOSIS — Z15.01 BRCA2 POSITIVE: ICD-10-CM

## 2020-11-24 PROCEDURE — 99213 OFFICE O/P EST LOW 20 MIN: CPT | Mod: 95 | Performed by: NURSE PRACTITIONER

## 2020-11-24 NOTE — PATIENT INSTRUCTIONS
We would like to see you back in 1 year. Please come 30 minutes prior for lab work.     When you are in need of a refill of your tamoxifen, please call your pharmacy and they will send us a request.     If you have any questions please call [phone]    Other instructions:  none

## 2020-12-15 DIAGNOSIS — F41.9 ANXIETY: ICD-10-CM

## 2020-12-16 RX ORDER — ALPRAZOLAM 0.5 MG
TABLET ORAL
Qty: 60 TABLET | Refills: 0 | Status: SHIPPED | OUTPATIENT
Start: 2020-12-16 | End: 2021-01-26

## 2020-12-16 NOTE — TELEPHONE ENCOUNTER
Xanax 0.5 mg      Last Written Prescription Date:  10/27/20  Last Fill Quantity: 60,   # refills: 0  Last Office Visit: 10/26/20  Future Office visit:       Routing refill request to provider for review/approval because:

## 2021-03-04 ENCOUNTER — IMMUNIZATION (OUTPATIENT)
Dept: FAMILY MEDICINE | Facility: OTHER | Age: 86
End: 2021-03-04
Attending: FAMILY MEDICINE
Payer: MEDICARE

## 2021-03-04 PROCEDURE — 91300 PR COVID VAC PFIZER DIL RECON 30 MCG/0.3 ML IM: CPT

## 2021-03-22 ENCOUNTER — IMMUNIZATION (OUTPATIENT)
Dept: FAMILY MEDICINE | Facility: OTHER | Age: 86
End: 2021-03-22
Attending: FAMILY MEDICINE
Payer: MEDICARE

## 2021-03-22 PROCEDURE — 91300 PR COVID VAC PFIZER DIL RECON 30 MCG/0.3 ML IM: CPT

## 2021-05-20 RX ORDER — PENICILLIN V POTASSIUM 500 MG/1
TABLET, FILM COATED ORAL
COMMUNITY
Start: 2021-03-01 | End: 2021-05-21

## 2021-05-20 NOTE — PROGRESS NOTES
"    Assessment & Plan     (L98.9) Skin lesion  (primary encounter diagnosis)  Comment: 1 cm skin lesion with central crusting left nares. It grew ti this size in 10 days. No drainage or erythema. Refer to Dr. Deleon.  Plan: DERMATOLOGY ADULT REFERRAL      (W57.XXXA) Tick bite, initial encounter  Comment: Observe for any change                   BMI:   Estimated body mass index is 27.39 kg/m  as calculated from the following:    Height as of 7/15/20: 1.702 m (5' 7\").    Weight as of this encounter: 79.3 kg (174 lb 14.4 oz).           No follow-ups on file.    BRENDAN Murillo  [facility]   ED is a 86 year old who presents for the following health issues     HPI     Concern - Sore on nose  Onset: 10 days ago  Description:  brown spot on side of nose -   Intensity: mild  Progression of Symptoms:  worsening  Accompanying Signs & Symptoms: was a pimple and turned into a big brown spot  Previous history of similar problem: no  Precipitating factors:        Worsened by: none  Alleviating factors:        Improved by: none  Therapies tried and outcome: None    Concern - Tick Bite  Onset: 3 days off  Description: Tick on back  Intensity: mild  Progression of Symptoms:  same  Accompanying Signs & Symptoms: none  Previous history of similar problem: none  Precipitating factors:        Worsened by: none  Alleviating factors:        Improved by: none  Therapies tried and outcome: None      Review of Systems   Constitutional, HEENT, cardiovascular, pulmonary, gi and gu systems are negative, except as otherwise noted.      Objective    /60 (BP Location: Left arm, Patient Position: Chair, Cuff Size: Adult Large)   Pulse 81   Temp 97.8  F (36.6  C) (Tympanic)   Resp 20   Wt 79.3 kg (174 lb 14.4 oz)   SpO2 98%   BMI 27.39 kg/m    Body mass index is 27.39 kg/m .  Physical Exam   Physical Exam:  Constitutional: healthy, alert and no distress  Skin: 1 cm skin lesion with central " excoriation/crusting, on left nares. No surrounding erythema. Small area of tick bite on low left back. Head intact. Easily removed with tweezer.  Psych: Mood is euthymic with corresponding affect

## 2021-05-21 ENCOUNTER — OFFICE VISIT (OUTPATIENT)
Dept: FAMILY MEDICINE | Facility: OTHER | Age: 86
End: 2021-05-21
Attending: PHYSICIAN ASSISTANT
Payer: COMMERCIAL

## 2021-05-21 VITALS
BODY MASS INDEX: 27.39 KG/M2 | TEMPERATURE: 97.8 F | RESPIRATION RATE: 20 BRPM | OXYGEN SATURATION: 98 % | SYSTOLIC BLOOD PRESSURE: 120 MMHG | DIASTOLIC BLOOD PRESSURE: 60 MMHG | WEIGHT: 174.9 LBS | HEART RATE: 81 BPM

## 2021-05-21 DIAGNOSIS — L98.9 SKIN LESION: Primary | ICD-10-CM
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
## 2021-05-21 PROCEDURE — 99213 OFFICE O/P EST LOW 20 MIN: CPT | Performed by: PHYSICIAN ASSISTANT

## 2021-05-21 PROCEDURE — G0463 HOSPITAL OUTPT CLINIC VISIT: HCPCS

## 2021-05-21 RX ORDER — TRIAMCINOLONE ACETONIDE 5 MG/G
CREAM TOPICAL
Qty: 30 G | Refills: 3 | Status: SHIPPED | OUTPATIENT
Start: 2021-05-21 | End: 2022-05-26

## 2021-05-21 ASSESSMENT — PAIN SCALES - GENERAL: PAINLEVEL: NO PAIN (0)

## 2021-05-21 NOTE — NURSING NOTE
"Chief Complaint   Patient presents with     Derm Problem     tick bite &  sore on nose       Initial /60 (BP Location: Left arm, Patient Position: Chair, Cuff Size: Adult Large)   Pulse 81   Temp 97.8  F (36.6  C) (Tympanic)   Resp 20   Wt 79.3 kg (174 lb 14.4 oz)   SpO2 98%   BMI 27.39 kg/m   Estimated body mass index is 27.39 kg/m  as calculated from the following:    Height as of 7/15/20: 1.702 m (5' 7\").    Weight as of this encounter: 79.3 kg (174 lb 14.4 oz).  Medication Reconciliation: complete  Gi España LPN  "

## 2021-06-02 ENCOUNTER — OFFICE VISIT (OUTPATIENT)
Dept: DERMATOLOGY | Facility: OTHER | Age: 86
End: 2021-06-02
Attending: DERMATOLOGY
Payer: MEDICARE

## 2021-06-02 VITALS
DIASTOLIC BLOOD PRESSURE: 70 MMHG | WEIGHT: 165 LBS | SYSTOLIC BLOOD PRESSURE: 120 MMHG | HEART RATE: 73 BPM | HEIGHT: 67 IN | BODY MASS INDEX: 25.9 KG/M2 | TEMPERATURE: 98.6 F | OXYGEN SATURATION: 96 %

## 2021-06-02 DIAGNOSIS — L98.9 SKIN LESION: ICD-10-CM

## 2021-06-02 DIAGNOSIS — D48.5 NEOPLASM OF UNCERTAIN BEHAVIOR OF SKIN: Primary | ICD-10-CM

## 2021-06-02 PROCEDURE — 99202 OFFICE O/P NEW SF 15 MIN: CPT | Mod: 25 | Performed by: DERMATOLOGY

## 2021-06-02 PROCEDURE — 11102 TANGNTL BX SKIN SINGLE LES: CPT | Performed by: DERMATOLOGY

## 2021-06-02 PROCEDURE — G0463 HOSPITAL OUTPT CLINIC VISIT: HCPCS | Mod: 25

## 2021-06-02 PROCEDURE — 88305 TISSUE EXAM BY PATHOLOGIST: CPT | Mod: TC | Performed by: DERMATOLOGY

## 2021-06-02 ASSESSMENT — PAIN SCALES - GENERAL: PAINLEVEL: NO PAIN (0)

## 2021-06-02 ASSESSMENT — MIFFLIN-ST. JEOR: SCORE: 1382.07

## 2021-06-02 NOTE — NURSING NOTE
"Chief Complaint   Patient presents with     Derm Problem       Initial /70 (BP Location: Left arm, Patient Position: Chair, Cuff Size: Adult Regular)   Pulse 73   Temp 98.6  F (37  C) (Tympanic)   Ht 1.702 m (5' 7\")   Wt 74.8 kg (165 lb)   SpO2 96%   BMI 25.84 kg/m   Estimated body mass index is 25.84 kg/m  as calculated from the following:    Height as of this encounter: 1.702 m (5' 7\").    Weight as of this encounter: 74.8 kg (165 lb).  Medication Reconciliation: complete  AAKASH STEVEN LPN    "

## 2021-06-02 NOTE — LETTER
6/2/2021       RE: Lio Daly  [address]  [place] [address]     Dear Colleague,    Thank you for referring your patient, Lio Daly, to the [facility]. Please see a copy of my visit note below.    S: Ed has developed a skin lesion of concern on the left side of the nose. He is not sure how long it has been present.    O: On the left alar crease is a lesion associated with a slit like erosion. The area is reddish. There is no exophytic tumor but the presence of the slit/erosion is concerning.    A: Likely a basal cell carcinoma presenting as an erosion.    P: Under local anesthesia, the site was shave biopsied. Bleeding stopped with ferric chloride hemostatic agent.  [facility] ( Dr Kurtis Rubi).    Will call Ed with the result when the result is sent to me. Will likely need another procedure.    JUNIE Deleon M.D.       Again, thank you for allowing me to participate in the care of your patient.      Sincerely,    LUCIANA Deleon MD

## 2021-06-06 LAB — COPATH REPORT: NORMAL

## 2021-06-06 NOTE — PROGRESS NOTES
S: Ed has developed a skin lesion of concern on the left side of the nose. He is not sure how long it has been present.    O: On the left alar crease is a lesion associated with a slit like erosion. The area is reddish. There is no exophytic tumor but the presence of the slit/erosion is concerning.    A: Likely a basal cell carcinoma presenting as an erosion.    P: Under local anesthesia, the site was shave biopsied. Bleeding stopped with ferric chloride hemostatic agent.  Specimen to [facility] at the [facility] ( Dr Kurtis Rubi).    Will call Ed with the result when the result is sent to me. Will likely need another procedure.    JUNIE Deleon M.D.

## 2021-08-13 ENCOUNTER — TELEPHONE (OUTPATIENT)
Dept: FAMILY MEDICINE | Facility: OTHER | Age: 86
End: 2021-08-13

## 2021-08-13 NOTE — TELEPHONE ENCOUNTER
12:31 PM    Reason for Call: Phone Call    Description: Ed called concerned because he cannot find his proof of covid vaccines. I don't know if he is capable of going on the [facility] website to print new one. Please call Ed to advise.     Was an appointment offered for this call?   No    Preferred method for responding to this message: [phone]    If we cannot reach you directly, may we leave a detailed response at the number you provided?   Yes    Can this message wait until your PCP/provider returns, if available today?   Yes, I told him Dr. Joy is out today    Gisele Bruno

## 2021-08-25 ENCOUNTER — IMMUNIZATION (OUTPATIENT)
Dept: FAMILY MEDICINE | Facility: OTHER | Age: 86
End: 2021-08-25
Attending: FAMILY MEDICINE
Payer: MEDICARE

## 2021-08-25 PROCEDURE — 0003A PR COVID VAC PFIZER DIL RECON 30 MCG/0.3 ML IM: CPT

## 2021-10-18 DIAGNOSIS — F41.9 ANXIETY: ICD-10-CM

## 2021-10-18 DIAGNOSIS — M54.16 LUMBAR RADICULOPATHY: ICD-10-CM

## 2021-10-18 DIAGNOSIS — C50.911 PRIMARY BREAST MALIGNANCY, RIGHT (H): ICD-10-CM

## 2021-10-20 RX ORDER — GABAPENTIN 300 MG/1
CAPSULE ORAL
Qty: 180 CAPSULE | Refills: 0 | Status: SHIPPED | OUTPATIENT
Start: 2021-10-20 | End: 2022-01-06 | Stop reason: ALTCHOICE

## 2021-10-20 RX ORDER — TAMOXIFEN CITRATE 20 MG/1
TABLET ORAL
Qty: 90 TABLET | Refills: 0 | Status: SHIPPED | OUTPATIENT
Start: 2021-10-20 | End: 2022-01-19

## 2021-10-20 RX ORDER — ALPRAZOLAM 0.5 MG
TABLET ORAL
Qty: 60 TABLET | Refills: 0 | Status: SHIPPED | OUTPATIENT
Start: 2021-10-20 | End: 2021-12-08

## 2021-10-20 NOTE — TELEPHONE ENCOUNTER
gabapentin      Last Written Prescription Date:  10/27/20  Last Fill Quantity: 180,   # refills: 3  Last Office Visit: 5/21/21  Future Office visit:    Next 5 appointments (look out 90 days)    Nov 24, 2021  2:15 PM  (Arrive by 2:00 PM)  Return Visit with Radha Sales NP  [facility] ([facility] ) [address] [place] [id number]  [phone]           Xanax 9/8/21 #60  Tamoxifen 10/27/20 #90 with 3

## 2021-11-01 ENCOUNTER — ALLIED HEALTH/NURSE VISIT (OUTPATIENT)
Dept: FAMILY MEDICINE | Facility: OTHER | Age: 86
End: 2021-11-01
Attending: FAMILY MEDICINE
Payer: MEDICARE

## 2021-11-01 DIAGNOSIS — Z23 NEED FOR INFLUENZA VACCINATION: Primary | ICD-10-CM

## 2021-11-01 PROCEDURE — 90662 IIV NO PRSV INCREASED AG IM: CPT

## 2021-11-01 PROCEDURE — G0008 ADMIN INFLUENZA VIRUS VAC: HCPCS

## 2021-11-24 ENCOUNTER — ONCOLOGY VISIT (OUTPATIENT)
Dept: ONCOLOGY | Facility: OTHER | Age: 86
End: 2021-11-24
Attending: NURSE PRACTITIONER
Payer: MEDICARE

## 2021-11-24 ENCOUNTER — LAB (OUTPATIENT)
Dept: LAB | Facility: OTHER | Age: 86
End: 2021-11-24
Attending: NURSE PRACTITIONER
Payer: MEDICARE

## 2021-11-24 VITALS
HEART RATE: 64 BPM | BODY MASS INDEX: 27.75 KG/M2 | RESPIRATION RATE: 20 BRPM | DIASTOLIC BLOOD PRESSURE: 60 MMHG | TEMPERATURE: 97.5 F | SYSTOLIC BLOOD PRESSURE: 100 MMHG | OXYGEN SATURATION: 98 % | WEIGHT: 176.81 LBS | HEIGHT: 67 IN

## 2021-11-24 DIAGNOSIS — Z15.09 BRCA2 POSITIVE: ICD-10-CM

## 2021-11-24 DIAGNOSIS — Z12.5 SPECIAL SCREENING FOR MALIGNANT NEOPLASM OF PROSTATE: ICD-10-CM

## 2021-11-24 DIAGNOSIS — Z15.01 BRCA2 POSITIVE: ICD-10-CM

## 2021-11-24 DIAGNOSIS — C50.921 MALIGNANT NEOPLASM OF RIGHT MALE BREAST, UNSPECIFIED ESTROGEN RECEPTOR STATUS, UNSPECIFIED SITE OF BREAST (H): ICD-10-CM

## 2021-11-24 DIAGNOSIS — R06.2 WHEEZING: ICD-10-CM

## 2021-11-24 DIAGNOSIS — Z85.3 HISTORY OF RIGHT BREAST CANCER: Primary | ICD-10-CM

## 2021-11-24 LAB
ALBUMIN SERPL-MCNC: 3.5 G/DL (ref 3.4–5)
ALP SERPL-CCNC: 76 U/L (ref 40–150)
ALT SERPL W P-5'-P-CCNC: 20 U/L (ref 0–70)
ANION GAP SERPL CALCULATED.3IONS-SCNC: 4 MMOL/L (ref 3–14)
AST SERPL W P-5'-P-CCNC: 22 U/L (ref 0–45)
BASOPHILS # BLD AUTO: 0.1 10E3/UL (ref 0–0.2)
BASOPHILS NFR BLD AUTO: 1 %
BILIRUB SERPL-MCNC: 0.3 MG/DL (ref 0.2–1.3)
BUN SERPL-MCNC: 23 MG/DL (ref 7–30)
CALCIUM SERPL-MCNC: 8.8 MG/DL (ref 8.5–10.1)
CHLORIDE BLD-SCNC: 109 MMOL/L (ref 94–109)
CO2 SERPL-SCNC: 27 MMOL/L (ref 20–32)
CREAT SERPL-MCNC: 1.11 MG/DL (ref 0.66–1.25)
EOSINOPHIL # BLD AUTO: 0.7 10E3/UL (ref 0–0.7)
EOSINOPHIL NFR BLD AUTO: 8 %
ERYTHROCYTE [DISTWIDTH] IN BLOOD BY AUTOMATED COUNT: 15.4 % (ref 10–15)
GFR SERPL CREATININE-BSD FRML MDRD: 59 ML/MIN/1.73M2
GLUCOSE BLD-MCNC: 113 MG/DL (ref 70–99)
HCT VFR BLD AUTO: 36.8 % (ref 40–53)
HGB BLD-MCNC: 11.6 G/DL (ref 13.3–17.7)
HOLD SPECIMEN: NORMAL
IMM GRANULOCYTES # BLD: 0 10E3/UL
IMM GRANULOCYTES NFR BLD: 0 %
LYMPHOCYTES # BLD AUTO: 3 10E3/UL (ref 0.8–5.3)
LYMPHOCYTES NFR BLD AUTO: 36 %
MCH RBC QN AUTO: 29.7 PG (ref 26.5–33)
MCHC RBC AUTO-ENTMCNC: 31.5 G/DL (ref 31.5–36.5)
MCV RBC AUTO: 94 FL (ref 78–100)
MONOCYTES # BLD AUTO: 0.8 10E3/UL (ref 0–1.3)
MONOCYTES NFR BLD AUTO: 9 %
NEUTROPHILS # BLD AUTO: 3.8 10E3/UL (ref 1.6–8.3)
NEUTROPHILS NFR BLD AUTO: 46 %
NRBC # BLD AUTO: 0 10E3/UL
NRBC BLD AUTO-RTO: 0 /100
PLATELET # BLD AUTO: 216 10E3/UL (ref 150–450)
POTASSIUM BLD-SCNC: 5.1 MMOL/L (ref 3.4–5.3)
PROT SERPL-MCNC: 7.4 G/DL (ref 6.8–8.8)
PSA SERPL-MCNC: 0.82 UG/L (ref 0–4)
RBC # BLD AUTO: 3.9 10E6/UL (ref 4.4–5.9)
SODIUM SERPL-SCNC: 140 MMOL/L (ref 133–144)
WBC # BLD AUTO: 8.3 10E3/UL (ref 4–11)

## 2021-11-24 PROCEDURE — G0463 HOSPITAL OUTPT CLINIC VISIT: HCPCS

## 2021-11-24 PROCEDURE — 99213 OFFICE O/P EST LOW 20 MIN: CPT | Performed by: NURSE PRACTITIONER

## 2021-11-24 PROCEDURE — 82247 BILIRUBIN TOTAL: CPT | Mod: ZL

## 2021-11-24 PROCEDURE — 36415 COLL VENOUS BLD VENIPUNCTURE: CPT | Mod: ZL

## 2021-11-24 PROCEDURE — 85004 AUTOMATED DIFF WBC COUNT: CPT | Mod: ZL

## 2021-11-24 PROCEDURE — G0103 PSA SCREENING: HCPCS | Mod: ZL

## 2021-11-24 ASSESSMENT — ANXIETY QUESTIONNAIRES
5. BEING SO RESTLESS THAT IT IS HARD TO SIT STILL: NOT AT ALL
6. BECOMING EASILY ANNOYED OR IRRITABLE: NOT AT ALL
2. NOT BEING ABLE TO STOP OR CONTROL WORRYING: NOT AT ALL
IF YOU CHECKED OFF ANY PROBLEMS ON THIS QUESTIONNAIRE, HOW DIFFICULT HAVE THESE PROBLEMS MADE IT FOR YOU TO DO YOUR WORK, TAKE CARE OF THINGS AT HOME, OR GET ALONG WITH OTHER PEOPLE: NOT DIFFICULT AT ALL
7. FEELING AFRAID AS IF SOMETHING AWFUL MIGHT HAPPEN: NOT AT ALL
4. TROUBLE RELAXING: NOT AT ALL
3. WORRYING TOO MUCH ABOUT DIFFERENT THINGS: NOT AT ALL
1. FEELING NERVOUS, ANXIOUS, OR ON EDGE: NOT AT ALL
GAD7 TOTAL SCORE: 0

## 2021-11-24 ASSESSMENT — PAIN SCALES - GENERAL: PAINLEVEL: NO PAIN (0)

## 2021-11-24 ASSESSMENT — MIFFLIN-ST. JEOR: SCORE: 1435.63

## 2021-11-24 NOTE — NURSING NOTE
"Oncology Rooming Note    November 24, 2021 2:02 PM   Lio Daly is a 87 year old male who presents for:    Chief Complaint   Patient presents with     Oncology Clinic Visit     Follow up History of right breast cancer     Initial Vitals: /60   Pulse 64   Temp 97.5  F (36.4  C) (Tympanic)   Resp 20   Ht 1.702 m (5' 7\")   Wt 80.2 kg (176 lb 12.9 oz)   SpO2 98%   BMI 27.69 kg/m   Estimated body mass index is 27.69 kg/m  as calculated from the following:    Height as of this encounter: 1.702 m (5' 7\").    Weight as of this encounter: 80.2 kg (176 lb 12.9 oz). Body surface area is 1.95 meters squared.  No Pain (0) Comment: Data Unavailable   No LMP for male patient.  Allergies reviewed: Yes  Medications reviewed: Yes    Medications: Medication refills not needed today.  Pharmacy name entered into [facility]: [facility] [place] [place] - [address]            "

## 2021-11-24 NOTE — PATIENT INSTRUCTIONS
We will get you scheduled for a CT scan of your chest and call you with the results.    We would like to see you back in 1 year. Please come 30 minutes prior for lab work.     When you are in need of a refill of your Tamoxifen, please call your pharmacy and they will send us a request.     If you have any questions please call [phone]    Other instructions:  Make an appointment to see Dr. Wren for a physical after the CT scan of your chest.

## 2021-11-24 NOTE — PROGRESS NOTES
Oncology Follow-up Visit:  November 24, 2021    Reason for Visit:  Patient presents with:  Oncology Clinic Visit: Follow up History of right breast cancer     Nursing Note and documentation reviewed: yes    HPI:   This is an 87-year-old male patient who  presents to the oncology clinic today in follow-up of a history of right breast cancer.  Patient underwent a right mastectomy with axillary node dissection in July 2015. He was placed on 20 mg of tamoxifen and remains on this.        He presents to the clinic today accompanied by his daughter stating he is doing good and offers no new complaints.  Denies any changes to the chest wall including any new lumps or any skin changes.  He follows with Dr. Wren as his PCP but has not seen him for quite some time.  He continues with lymphedema to the right upper extremity and uses the sleeve if needed.  He continues to be very active and states he will be going to [employer] to look for a battery for his tractor today.    Oncologic History:     Patient had presented with complaints of right nipple irritation over a six-month period and was noted to have a mass in the right breast. Mammogram was completed followed by biopsy and pathology showed an invasive carcinoma. On 7/27/2015, he underwent a right modified radical mastectomy with axillary node dissection by Dr. Garsia. 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 DCIS was present, grade 2 with expansive comedonecrosis. Node dissection revealed 1/17 nodes revealing micrometastasis. Tumor was ER/DE positive, HER-2/thomas negative. Patient was staged pathologic T1c N1mi M0.      At consultation, patient was not interested in adjuvant chemotherapy and was placed on tamoxifen 20 mg daily.      BRCA 2+      Current Chemo Regime/TX: Tamoxifen 20mg daily initiated 8/2015  Current Cycle: n/a  # of completed cycles:  n/a      Previous treatment:  n/a    Past Medical History:   Diagnosis Date     Arthritis      Cancer (H)      Dysphagia 06/28/2004     HTN (hypertension)      Need for prophylactic vaccination and inoculation 12/10/2001     Need for prophylactic vaccination and inoculation, 11/06/2003     Other and unspecified hyperlipidemia 05/01/2001     Prediabetes      Shortness of breath 03/28/2006     Shoulder pain 06/15/2012     Thyroid disease      Unspecified essential hypertension 05/18/2000       Past Surgical History:   Procedure Laterality Date     CARDIAC SURGERY      angiogram 1992 [facility]     COLONOSCOPY      1998     GENITOURINARY SURGERY      prostate bx [place]     GENITOURINARY SURGERY      Prostate bx [facility]     GI SURGERY      EGD     GI SURGERY      Pt has EGD with removal of food from esophagus     MASTECTOMY SIMPLE, SENTINEL NODE, COMBINED Right 7/27/2015    Procedure: COMBINED MASTECTOMY SIMPLE, SENTINEL NODE;  Surgeon: Aliyah Garsia MD;  Location: [facility]     ORTHOPEDIC SURGERY      bilateral knee replacements       Family History   Problem Relation Age of Onset     Cancer - colorectal Mother      Eye Disorder Mother         cataracts     Musculoskeletal Disorder Mother         CTS     Colon Cancer Mother      Other Cancer Mother      Cancer Father         lung cancer     Genitourinary Problems Father         prostate problems     Other Cancer Father         lung     C.A.D. Brother         CABG     Eye Disorder Brother         cataracts     Breast Cancer Other      Ovarian Cancer Other      Pancreatic Cancer Other      Diabetes No family hx of      Coronary Artery Disease No family hx of      Cerebrovascular Disease No family hx of      Hyperlipidemia No family hx of      Hypertension No family hx of      Prostate Cancer No family hx of      Thyroid Disease No family hx of      Genetic Disorder No family hx of      Asthma No family hx of      Anesthesia Reaction No family hx of        Social History      Socioeconomic History     Marital status:      Spouse name: Marisa     Number of children: Not on file     Years of education: Not on file     Highest education level: Not on file   Occupational History     Not on file   Tobacco Use     Smoking status: Former Smoker     Packs/day: 0.50     Years: 47.00     Pack years: 23.50     Types: Cigarettes     Start date: 1948     Quit date: 1995     Years since quittin.8     Smokeless tobacco: Never Used   Substance and Sexual Activity     Alcohol use: No     Comment: former     Drug use: No     Sexual activity: Not on file   Other Topics Concern      Service Not Asked     Blood Transfusions Not Asked     Caffeine Concern Yes     Comment: coffee, 2 cups daily     Occupational Exposure Not Asked     Hobby Hazards Not Asked     Sleep Concern Not Asked     Stress Concern Not Asked     Weight Concern Not Asked     Special Diet Not Asked     Back Care Not Asked     Exercise Not Asked     Bike Helmet Not Asked     Seat Belt Not Asked     Self-Exams Not Asked     Parent/sibling w/ CABG, MI or angioplasty before 65F 55M? No   Social History Narrative     Not on file     Social Determinants of Health     Financial Resource Strain: Not on file   Food Insecurity: Not on file   Transportation Needs: Not on file   Physical Activity: Not on file   Stress: Not on file   Social Connections: Not on file   Intimate Partner Violence: Not on file   Housing Stability: Not on file       Current Outpatient Medications   Medication     acetaminophen (TYLENOL) 325 MG tablet     ALPRAZolam (XANAX) 0.5 MG tablet     Ascorbic Acid (VITAMIN C PO)     atenolol (TENORMIN) 50 MG tablet     gabapentin (NEURONTIN) 300 MG capsule     levothyroxine (SYNTHROID/LEVOTHROID) 75 MCG tablet     omeprazole (PRILOSEC) 20 MG DR capsule     order for DME     pravastatin (PRAVACHOL) 40 MG tablet     sennosides (SENOKOT) 8.6 MG tablet     tamoxifen (NOLVADEX) 20 MG tablet     tamsulosin  "(FLOMAX) 0.4 MG capsule     triamcinolone (ARISTOCORT HP) 0.5 % external cream     No current facility-administered medications for this visit.        No Known Allergies    Review Of Systems:  Constitutional:    denies fever, weight changes, chills, and night sweats.  Eyes:    denies blurred or double vision  Ears/Nose/Throat:   denies ear pain, nose problems, difficulty swallowing  Respiratory:   Admits to some shortness of breath with exertion, denies cough  Skin:   denies rash, lesions  Breast/Chest wall:   denies pain, lumps, skin changes  Cardiovascular:   denies chest pain, palpitations  Gastrointestinal:   denies abdominal pain, bloating, nausea, vomiting, early satiety; no change in bowel habits or blood in stool  Genitourinary:   denies difficulty with urination, blood in urine  Musculoskeletal:    denies new muscle pain, bone pain  Neurologic:   denies lightheadedness, headaches, numbness or tingling  Hematologic/Lymphatic/Immunologic:   denies easy bruising, easy bleeding, lumps or bumps noted  Endocrine:   Denies increased thirst      Physical Exam:  /60   Pulse 64   Temp 97.5  F (36.4  C) (Tympanic)   Resp 20   Ht 1.702 m (5' 7\")   Wt 80.2 kg (176 lb 12.9 oz)   SpO2 98%   BMI 27.69 kg/m      GENERAL APPEARANCE: Healthy, alert and in no acute distress.  HEENT: Normocephalic, Sclerae anicteric.   NECK:   No asymmetry or masses, no thyromegaly.  LYMPHATICS: No palpable cervical, supraclavicular, axillary, or inguinal nodes   RESP: Lungs with expiratory wheezes posteriorly bilaterally, respirations regular and easy at rest but somewhat dyspneic with ambulation but no acute distress with ambulation  CARDIOVASCULAR: Regular rate and rhythm. Normal S1, S2; no murmur, gallop, or rub.  ABDOMEN: Soft, nontender. Bowel sounds auscultated all 4 quadrants. No palpable organomegaly or masses.  BREAST/Chest wall: No lumps palpated  MUSCULOSKELETAL: Extremities without gross deformities noted. No edema of " bilateral lower extremities.  NEURO: Alert and oriented x 3.  Gait steady.  PSYCHIATRIC: Mentation and affect appear normal.  Mood appropriate.    Laboratory:     Component      Latest Ref Rng & Units 11/24/2021   WBC      4.0 - 11.0 10e3/uL 8.3   RBC Count      4.40 - 5.90 10e6/uL 3.90 (L)   Hemoglobin      13.3 - 17.7 g/dL 11.6 (L)   Hematocrit      40.0 - 53.0 % 36.8 (L)   MCV      78 - 100 fL 94   MCH      26.5 - 33.0 pg 29.7   MCHC      31.5 - 36.5 g/dL 31.5   RDW      10.0 - 15.0 % 15.4 (H)   Platelet Count      150 - 450 10e3/uL 216   % Neutrophils      % 46   % Lymphocytes      % 36   % Monocytes      % 9   % Eosinophils      % 8   % Basophils      % 1   % Immature Granulocytes      % 0   NRBCs per 100 WBC      <1 /100 0   Absolute Neutrophils      1.6 - 8.3 10e3/uL 3.8   Absolute Lymphocytes      0.8 - 5.3 10e3/uL 3.0   Absolute Monocytes      0.0 - 1.3 10e3/uL 0.8   Absolute Eosinophils      0.0 - 0.7 10e3/uL 0.7   Absolute Basophils      0.0 - 0.2 10e3/uL 0.1   Absolute Immature Granulocytes      <=0.0 10e3/uL 0.0   Absolute NRBCs      10e3/uL 0.0   Sodium      133 - 144 mmol/L 140   Potassium      3.4 - 5.3 mmol/L 5.1   Chloride      94 - 109 mmol/L 109   Carbon Dioxide      20 - 32 mmol/L 27   Anion Gap      3 - 14 mmol/L 4   Urea Nitrogen      7 - 30 mg/dL 23   Creatinine      0.66 - 1.25 mg/dL 1.11   Calcium      8.5 - 10.1 mg/dL 8.8   Glucose      70 - 99 mg/dL 113 (H)   Alkaline Phosphatase      40 - 150 U/L 76   AST      0 - 45 U/L 22   ALT      0 - 70 U/L 20   Protein Total      6.8 - 8.8 g/dL 7.4   Albumin      3.4 - 5.0 g/dL 3.5   Bilirubin Total      0.2 - 1.3 mg/dL 0.3   GFR Estimate      >60 mL/min/1.73m2 59 (L)   PSA      0.00 - 4.00 ug/L 0.82     Imaging Studies: None completed for today's visit      ASSESSMENT/PLAN:    #1 history of breast cancer: History of  Stage 1, pT1c N1mi M0 carcinoma of the right breast diagnosed in July 2015.  He underwent mastectomy with node dissection with 1/17  nodes revealing micrometastases; tumor was 2cm, ER/MS positive, HER-2/thomas negative.  BRCA2 positive.  He'll continue with tamoxifen 20 mg daily and follow up in 1 year with a CBC, CMP and PSA.    #2 wheezing/shortness of breath: We will obtain a CT scan of the chest and call him with this result.    #3 BRCA2 positive: PSA is within normal limits.     I encouraged patient to call with any questions or concerns.    Radha Sales NP  APRN, FNP-BC, AOCNP

## 2021-11-25 ASSESSMENT — ANXIETY QUESTIONNAIRES: GAD7 TOTAL SCORE: 0

## 2021-11-25 ASSESSMENT — PATIENT HEALTH QUESTIONNAIRE - PHQ9: SUM OF ALL RESPONSES TO PHQ QUESTIONS 1-9: 0

## 2021-12-06 DIAGNOSIS — E78.5 HYPERLIPIDEMIA LDL GOAL <130: ICD-10-CM

## 2021-12-06 DIAGNOSIS — F41.9 ANXIETY: ICD-10-CM

## 2021-12-06 DIAGNOSIS — K21.9 GASTROESOPHAGEAL REFLUX DISEASE WITHOUT ESOPHAGITIS: ICD-10-CM

## 2021-12-06 DIAGNOSIS — N40.0 BENIGN PROSTATIC HYPERPLASIA, UNSPECIFIED WHETHER LOWER URINARY TRACT SYMPTOMS PRESENT: ICD-10-CM

## 2021-12-06 DIAGNOSIS — E03.9 HYPOTHYROIDISM, UNSPECIFIED TYPE: ICD-10-CM

## 2021-12-06 DIAGNOSIS — I10 HTN, GOAL BELOW 140/80: ICD-10-CM

## 2021-12-07 RX ORDER — PRAVASTATIN SODIUM 40 MG
TABLET ORAL
Qty: 135 TABLET | Refills: 3 | Status: SHIPPED | OUTPATIENT
Start: 2021-12-07 | End: 2023-02-13

## 2021-12-07 RX ORDER — ATENOLOL 50 MG/1
TABLET ORAL
Qty: 90 TABLET | Refills: 3 | Status: SHIPPED | OUTPATIENT
Start: 2021-12-07 | End: 2022-12-12

## 2021-12-07 RX ORDER — TAMSULOSIN HYDROCHLORIDE 0.4 MG/1
CAPSULE ORAL
Qty: 180 CAPSULE | Refills: 3 | Status: SHIPPED | OUTPATIENT
Start: 2021-12-07 | End: 2023-02-13

## 2021-12-07 RX ORDER — LEVOTHYROXINE SODIUM 75 UG/1
TABLET ORAL
Qty: 90 TABLET | Refills: 3 | Status: SHIPPED | OUTPATIENT
Start: 2021-12-07 | End: 2022-01-18

## 2021-12-08 RX ORDER — ALPRAZOLAM 0.5 MG
TABLET ORAL
Qty: 60 TABLET | Refills: 0 | Status: SHIPPED | OUTPATIENT
Start: 2021-12-08 | End: 2022-01-19

## 2021-12-09 ENCOUNTER — HOSPITAL ENCOUNTER (OUTPATIENT)
Dept: CT IMAGING | Facility: HOSPITAL | Age: 86
Discharge: HOME | End: 2021-12-09
Attending: NURSE PRACTITIONER | Admitting: NURSE PRACTITIONER
Payer: MEDICARE

## 2021-12-09 DIAGNOSIS — R06.2 WHEEZING: ICD-10-CM

## 2021-12-09 DIAGNOSIS — Z85.3 HISTORY OF RIGHT BREAST CANCER: ICD-10-CM

## 2021-12-09 PROCEDURE — 71260 CT THORAX DX C+: CPT

## 2021-12-09 PROCEDURE — 250N000011 HC RX IP 250 OP 636: Performed by: RADIOLOGY

## 2021-12-09 RX ORDER — IOPAMIDOL 755 MG/ML
70 INJECTION, SOLUTION INTRAVASCULAR ONCE
Status: COMPLETED | OUTPATIENT
Start: 2021-12-09 | End: 2021-12-09

## 2021-12-09 RX ADMIN — IOPAMIDOL 70 ML: 755 INJECTION, SOLUTION INTRAVENOUS at 11:02

## 2021-12-13 ENCOUNTER — TELEPHONE (OUTPATIENT)
Dept: FAMILY MEDICINE | Facility: OTHER | Age: 86
End: 2021-12-13
Payer: COMMERCIAL

## 2021-12-13 NOTE — TELEPHONE ENCOUNTER
Pt is taking gabapentin twice daily.  He states he is unsteady at times and is wondering if he can stop taking the tablet in the morning to see if that helps?

## 2021-12-13 NOTE — TELEPHONE ENCOUNTER
That is exactly what I would do.  Gabapentin at night only.  Report ongoing concerns.  Thanks.  Rubén Wren MD

## 2022-01-05 NOTE — PROGRESS NOTES
"    Rubén Wren MD  [facility]   Ed is a 87 year old who presents for the following health issues     Medication Reduction - He was wondering if he could go down from 2 tablets of Neurontin to 1 tablet. He wants this because he has had some instability when walking. He actually went from 2 to 1 tablets three weeks ago of his own volition and notes that he feels like his balance has improved. He has had no increases to pain since then and questions why he is on it. He does admit to falling last night, tripping over a rug in the house. He did not hit his head and has no pain or bruising anywhere from the fall and is able to walk on his own.     HPI     Hyperlipidemia Follow-Up      Are you regularly taking any medication or supplement to lower your cholesterol?   Yes- pravastatin    Are you having muscle aches or other side effects that you think could be caused by your cholesterol lowering medication?  No    Hypothyroidism Follow-up      Since last visit, patient describes the following symptoms: Weight stable, no hair loss, no skin changes, no constipation, no loose stools          Review of Systems   CONSTITUTIONAL:NEGATIVE for fever, chills, change in weight  INTEGUMENTARY/SKIN: NEGATIVE for worrisome rashes, moles or lesions  EYES: NEGATIVE for vision changes or irritation  ENT/MOUTH: NEGATIVE for ear, mouth and throat problems  RESP:NEGATIVE for significant cough or SOB  BREAST: NEGATIVE for masses, tenderness or discharge  CV: NEGATIVE for chest pain, palpitations or peripheral edema  GI: NEGATIVE for nausea, abdominal pain, heartburn, or change in bowel habits  MUSCULOSKELETAL: NEGATIVE for significant arthralgias or myalgia  HEME/ALLERGY/IMMUNE: NEGATIVE for bleeding problems  PSYCHIATRIC: NEGATIVE for changes in mood or affect      Objective    /86   Pulse 67   Temp 98.6  F (37  C)   Ht 1.702 m (5' 7\")   Wt 82.1 kg (181 lb)   SpO2 98%   BMI 28.35 kg/m  "   Body mass index is 28.35 kg/m .  Physical Exam   GENERAL: healthy, alert and no distress  RESP: lungs clear to auscultation - no rales, rhonchi or wheezes  CV: regular rate and rhythm, normal S1 S2, no S3 or S4, no murmur, click or rub, no peripheral edema and peripheral pulses strong              (E03.9) Hypothyroidism, unspecified type  (primary encounter diagnosis)  Comment:   Plan: TSH with free T4 reflex        Update    (E78.5) Hyperlipidemia LDL goal <130  Comment:   Plan: Lipid Profile        Update    (M54.16) Lumbar radiculopathy  Comment:   Plan: gabapentin (NEURONTIN) 100 MG capsule        Discussed patient concern and agreed with plan to taper him off off Neurontin completely. Discussed going down to 100MG a day for 7 days and then stopping completely. Also discussed to reach out if pain symptoms return. Patient is comfortable with plan and has no questions at this time.    (R73.03) Prediabetes  Comment:   Plan: Updating with blood work.    (C50.911) Primary breast malignancy, right (H)  Comment:   Plan: No concerns at this time and is following up with oncology.    (F41.9) Anxiety  Comment:  Plan: No issues with Zanax or associated side effects.

## 2022-01-06 ENCOUNTER — OFFICE VISIT (OUTPATIENT)
Dept: FAMILY MEDICINE | Facility: OTHER | Age: 87
End: 2022-01-06
Attending: FAMILY MEDICINE
Payer: COMMERCIAL

## 2022-01-06 VITALS
WEIGHT: 181 LBS | HEART RATE: 67 BPM | TEMPERATURE: 98.6 F | BODY MASS INDEX: 28.41 KG/M2 | SYSTOLIC BLOOD PRESSURE: 138 MMHG | DIASTOLIC BLOOD PRESSURE: 86 MMHG | HEIGHT: 67 IN | OXYGEN SATURATION: 98 %

## 2022-01-06 DIAGNOSIS — E03.9 HYPOTHYROIDISM, UNSPECIFIED TYPE: Primary | ICD-10-CM

## 2022-01-06 DIAGNOSIS — E78.5 HYPERLIPIDEMIA LDL GOAL <130: ICD-10-CM

## 2022-01-06 DIAGNOSIS — M54.16 LUMBAR RADICULOPATHY: ICD-10-CM

## 2022-01-06 DIAGNOSIS — F41.9 ANXIETY: ICD-10-CM

## 2022-01-06 DIAGNOSIS — R73.03 PREDIABETES: ICD-10-CM

## 2022-01-06 DIAGNOSIS — C50.911 PRIMARY BREAST MALIGNANCY, RIGHT (H): ICD-10-CM

## 2022-01-06 PROCEDURE — G0463 HOSPITAL OUTPT CLINIC VISIT: HCPCS

## 2022-01-06 PROCEDURE — 84443 ASSAY THYROID STIM HORMONE: CPT | Mod: ZL | Performed by: FAMILY MEDICINE

## 2022-01-06 PROCEDURE — 80061 LIPID PANEL: CPT | Mod: ZL | Performed by: FAMILY MEDICINE

## 2022-01-06 PROCEDURE — 36415 COLL VENOUS BLD VENIPUNCTURE: CPT | Mod: ZL | Performed by: FAMILY MEDICINE

## 2022-01-06 PROCEDURE — 84439 ASSAY OF FREE THYROXINE: CPT | Mod: ZL | Performed by: FAMILY MEDICINE

## 2022-01-06 PROCEDURE — 99214 OFFICE O/P EST MOD 30 MIN: CPT | Performed by: FAMILY MEDICINE

## 2022-01-06 RX ORDER — GABAPENTIN 100 MG/1
CAPSULE ORAL
Qty: 7 CAPSULE | Refills: 0 | Status: SHIPPED | OUTPATIENT
Start: 2022-01-06 | End: 2022-08-08

## 2022-01-06 ASSESSMENT — PAIN SCALES - GENERAL: PAINLEVEL: NO PAIN (0)

## 2022-01-06 ASSESSMENT — MIFFLIN-ST. JEOR: SCORE: 1454.64

## 2022-01-06 NOTE — NURSING NOTE
"Chief Complaint   Patient presents with     Recheck Medication       Initial /86   Pulse 67   Temp 98.6  F (37  C)   Ht 1.702 m (5' 7\")   Wt 82.1 kg (181 lb)   SpO2 98%   BMI 28.35 kg/m   Estimated body mass index is 28.35 kg/m  as calculated from the following:    Height as of this encounter: 1.702 m (5' 7\").    Weight as of this encounter: 82.1 kg (181 lb).  Medication Reconciliation: complete  Tonia Noriega LPN  "

## 2022-01-06 NOTE — PATIENT INSTRUCTIONS
Patient Education     Anxiety Reaction  Anxiety is the feeling we all get when we think something bad might happen. It is a normal response to stress and normally causes only a mild reaction. When anxiety becomes more severe, it can interfere with daily life. In some cases, you may not even be aware of what you re anxious about. There may also be a genetic link. Or it may be a learned behavior in the home.   Both psychological and physical triggers cause stress reaction. It's often a response to fear or emotional stress, real or imagined. This stress may come from home, family, work, or social relationships.   During an anxiety reaction, you may feel:    Helpless    Nervous    Depressed    Grouchy  Your body may show signs of anxiety in many ways. You may experience:    Dry mouth    Shakiness    Dizziness    Weakness    Trouble breathing    Breathing fast (hyperventilating)    Chest pressure    Sweating    Headache    Nausea    Diarrhea    Tiredness    Inability to sleep    Sexual problems  Home care    Try to find the sources of stress in your life. They may not be obvious. These may include:  ? Daily hassles of life (such as traffic jams, missed appointments, or car troubles)  ? Major life changes, both good (new baby or job promotion) and bad (loss of job or loss of loved one)  ? Overload (feeling that you have too many responsibilities and can't take care of all of them at once)  ? Feeling helpless or feeling that your problems can't be solved    Notice how your body reacts to stress. Learn to listen to your body signals. This will help you take action before the stress becomes severe.    When you can, do something about the source of your stress. (Avoid hassles, limit the amount of change that happens in your life at one time, and take a break when you feel overloaded).    Unfortunately, many stressful situations can't be avoided. It is necessary to learn how to better manage stress. There are many proven  methods that will reduce your anxiety. These include simple things such as exercise, good nutrition, and adequate rest. Also, there are certain techniques that are helpful:  ? Relaxation  ? Breathing exercises  ? Visualization  ? Biofeedback  ? Meditation  For more information about this, talk with your healthcare provider. Or check online or at your local library or bookstore. You'll find many books and audiobooks on this subject.   Follow-up care  If you feel your anxiety is not responding to self-help measures, call your healthcare provider or make an appointment with a counselor. You may need short-term psychological counseling or medicine to help you manage stress.   Call 911  Call 911 if any of these happen:     Trouble breathing    Confusion    Drowsiness or trouble waking up    Fainting or loss of consciousness    Rapid heart rate    Seizure    New chest pain that becomes more severe, lasts longer, or spreads into your shoulder, arm, neck, jaw, or back  When to get medical advice  Call your healthcare provider right away if any of these happen:    Your symptoms get worse    Severe headache not eased by rest and mild pain reliever  Martha last reviewed this educational content on 4/1/2020 2000-2021 [employer]. All rights reserved. This information is not intended as a substitute for professional medical care. Always follow your healthcare professional's instructions.

## 2022-01-07 LAB
CHOLEST SERPL-MCNC: 141 MG/DL
FASTING STATUS PATIENT QL REPORTED: NO
HDLC SERPL-MCNC: 44 MG/DL
LDLC SERPL CALC-MCNC: 53 MG/DL
NONHDLC SERPL-MCNC: 97 MG/DL
T4 FREE SERPL-MCNC: 0.88 NG/DL (ref 0.76–1.46)
TRIGL SERPL-MCNC: 222 MG/DL
TSH SERPL DL<=0.005 MIU/L-ACNC: 4.69 MU/L (ref 0.4–4)

## 2022-01-18 ENCOUNTER — TELEPHONE (OUTPATIENT)
Dept: FAMILY MEDICINE | Facility: OTHER | Age: 87
End: 2022-01-18
Payer: COMMERCIAL

## 2022-01-18 DIAGNOSIS — F41.9 ANXIETY: ICD-10-CM

## 2022-01-18 DIAGNOSIS — C50.911 PRIMARY BREAST MALIGNANCY, RIGHT (H): ICD-10-CM

## 2022-01-18 DIAGNOSIS — E03.9 HYPOTHYROIDISM, UNSPECIFIED TYPE: ICD-10-CM

## 2022-01-18 RX ORDER — LEVOTHYROXINE SODIUM 88 UG/1
88 TABLET ORAL DAILY
Qty: 90 TABLET | Refills: 0 | Status: SHIPPED | OUTPATIENT
Start: 2022-01-18 | End: 2022-04-25

## 2022-01-19 RX ORDER — ALPRAZOLAM 0.5 MG
TABLET ORAL
Qty: 60 TABLET | Refills: 0 | Status: SHIPPED | OUTPATIENT
Start: 2022-01-19 | End: 2022-03-02

## 2022-01-19 RX ORDER — TAMOXIFEN CITRATE 20 MG/1
TABLET ORAL
Qty: 90 TABLET | Refills: 0 | Status: SHIPPED | OUTPATIENT
Start: 2022-01-19 | End: 2022-05-02

## 2022-01-19 NOTE — TELEPHONE ENCOUNTER
Xanax      Last Written Prescription Date:  12/8/21  Last Fill Quantity: 60,   # refills: 0  Last Office Visit: 1/6/22  Future Office visit:       Routing refill request to provider for review/approval because:

## 2022-01-19 NOTE — TELEPHONE ENCOUNTER
Tamoxifen      Last Written Prescription Date:  10/20/21  Last Fill Quantity: 90,   # refills: 0  Last Office Visit: 1/6/22  Future Office visit:       Routing refill request to provider for review/approval because:

## 2022-02-28 ENCOUNTER — TELEPHONE (OUTPATIENT)
Dept: FAMILY MEDICINE | Facility: OTHER | Age: 87
End: 2022-02-28
Payer: COMMERCIAL

## 2022-03-01 ENCOUNTER — LAB (OUTPATIENT)
Dept: LAB | Facility: OTHER | Age: 87
End: 2022-03-01
Payer: MEDICARE

## 2022-03-01 DIAGNOSIS — E03.9 HYPOTHYROIDISM, UNSPECIFIED TYPE: ICD-10-CM

## 2022-03-01 DIAGNOSIS — Z12.5 SPECIAL SCREENING FOR MALIGNANT NEOPLASM OF PROSTATE: ICD-10-CM

## 2022-03-01 DIAGNOSIS — Z85.3 HISTORY OF RIGHT BREAST CANCER: ICD-10-CM

## 2022-03-01 DIAGNOSIS — Z15.09 BRCA2 POSITIVE: ICD-10-CM

## 2022-03-01 DIAGNOSIS — Z15.01 BRCA2 POSITIVE: ICD-10-CM

## 2022-03-01 LAB
ERYTHROCYTE [DISTWIDTH] IN BLOOD BY AUTOMATED COUNT: NORMAL %
HCT VFR BLD AUTO: NORMAL %
HGB BLD-MCNC: NORMAL G/DL
MCH RBC QN AUTO: NORMAL PG
MCHC RBC AUTO-ENTMCNC: NORMAL G/DL
MCV RBC AUTO: NORMAL FL
PLATELET # BLD AUTO: NORMAL 10*3/UL
RBC # BLD AUTO: NORMAL 10*6/UL
T4 FREE SERPL-MCNC: 1.05 NG/DL (ref 0.76–1.46)
TSH SERPL DL<=0.005 MIU/L-ACNC: 4.15 MU/L (ref 0.4–4)
WBC # BLD AUTO: NORMAL 10*3/UL

## 2022-03-01 PROCEDURE — 84443 ASSAY THYROID STIM HORMONE: CPT | Mod: ZL

## 2022-03-01 PROCEDURE — 84439 ASSAY OF FREE THYROXINE: CPT | Mod: ZL

## 2022-03-01 PROCEDURE — 36415 COLL VENOUS BLD VENIPUNCTURE: CPT | Mod: ZL

## 2022-03-01 PROCEDURE — 85025 COMPLETE CBC W/AUTO DIFF WBC: CPT | Mod: ZL

## 2022-03-01 NOTE — LETTER
March 2, 2022       JERI Daly  [address] [address] [address]        Dear ,    We are writing to inform you of your test results.    Your test results fall within the expected range(s) or remain unchanged from previous results.  Recheck your thyroid level in 6 months.    Resulted Orders   TSH with free T4 reflex   Result Value Ref Range    TSH 4.15 (H) 0.40 - 4.00 mU/L   T4 free   Result Value Ref Range    Free T4 1.05 0.76 - 1.46 ng/dL       If you have any questions or concerns, please call the clinic at the number listed above.       Sincerely,      Rubén Wren MD

## 2022-04-19 ENCOUNTER — LAB (OUTPATIENT)
Dept: LAB | Facility: OTHER | Age: 87
End: 2022-04-19
Payer: MEDICARE

## 2022-04-19 DIAGNOSIS — E03.9 HYPOTHYROIDISM, UNSPECIFIED TYPE: ICD-10-CM

## 2022-04-19 LAB — TSH SERPL DL<=0.005 MIU/L-ACNC: 3.91 MU/L (ref 0.4–4)

## 2022-04-19 PROCEDURE — 84443 ASSAY THYROID STIM HORMONE: CPT | Mod: ZL

## 2022-04-19 PROCEDURE — 36415 COLL VENOUS BLD VENIPUNCTURE: CPT | Mod: ZL

## 2022-04-25 ENCOUNTER — TELEPHONE (OUTPATIENT)
Dept: FAMILY MEDICINE | Facility: OTHER | Age: 87
End: 2022-04-25
Payer: COMMERCIAL

## 2022-04-25 DIAGNOSIS — E03.9 HYPOTHYROIDISM, UNSPECIFIED TYPE: ICD-10-CM

## 2022-04-25 RX ORDER — LEVOTHYROXINE SODIUM 88 UG/1
88 TABLET ORAL DAILY
Qty: 90 TABLET | Refills: 1 | Status: SHIPPED | OUTPATIENT
Start: 2022-04-25 | End: 2022-10-11

## 2022-04-25 NOTE — TELEPHONE ENCOUNTER
1:09 PM    Reason for Call: Phone Call    Description: Ed was wondering if you could call him with his lab results from last week.     Was an appointment offered for this call? No  If yes : Appointment type              Date    Preferred method for responding to this message: Telephone Call  What is your phone number ?  [phone]    If we cannot reach you directly, may we leave a detailed response at the number you provided? No    Can this message wait until your PCP/provider returns, if available today?  Not applicable    Gisele Bruno

## 2022-04-28 DIAGNOSIS — C50.911 PRIMARY BREAST MALIGNANCY, RIGHT (H): ICD-10-CM

## 2022-05-02 RX ORDER — TAMOXIFEN CITRATE 20 MG/1
TABLET ORAL
Qty: 90 TABLET | Refills: 3 | Status: SHIPPED | OUTPATIENT
Start: 2022-05-02 | End: 2023-05-15

## 2022-05-25 ENCOUNTER — OFFICE VISIT (OUTPATIENT)
Dept: FAMILY MEDICINE | Facility: OTHER | Age: 87
End: 2022-05-25
Attending: FAMILY MEDICINE
Payer: MEDICARE

## 2022-05-25 ENCOUNTER — NURSE TRIAGE (OUTPATIENT)
Dept: FAMILY MEDICINE | Facility: OTHER | Age: 87
End: 2022-05-25

## 2022-05-25 ENCOUNTER — TELEPHONE (OUTPATIENT)
Dept: FAMILY MEDICINE | Facility: OTHER | Age: 87
End: 2022-05-25

## 2022-05-25 VITALS
TEMPERATURE: 98.6 F | HEART RATE: 67 BPM | DIASTOLIC BLOOD PRESSURE: 86 MMHG | RESPIRATION RATE: 20 BRPM | SYSTOLIC BLOOD PRESSURE: 138 MMHG

## 2022-05-25 DIAGNOSIS — L03.111 CELLULITIS OF RIGHT AXILLA: Primary | ICD-10-CM
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
## 2022-05-25 PROCEDURE — 0054A COVID-19,PF,PFIZER (12+ YRS): CPT

## 2022-05-25 PROCEDURE — 99213 OFFICE O/P EST LOW 20 MIN: CPT | Performed by: FAMILY MEDICINE

## 2022-05-25 PROCEDURE — G0463 HOSPITAL OUTPT CLINIC VISIT: HCPCS | Mod: 25

## 2022-05-25 RX ORDER — DOXYCYCLINE 100 MG/1
100 CAPSULE ORAL 2 TIMES DAILY
Qty: 14 CAPSULE | Refills: 0 | Status: SHIPPED | OUTPATIENT
Start: 2022-05-25 | End: 2022-06-01

## 2022-05-25 NOTE — PROGRESS NOTES
"  Assessment & Plan     Cellulitis of right axilla  Even though it was an engorged wood tick, will cover cellulitis with lyme prophylaxis  - doxycycline hyclate (VIBRAMYCIN) 100 MG capsule; Take 1 capsule (100 mg) by mouth 2 times daily for 7 days    Tick bite, unspecified site, initial encounter  Follow-up if worsens or other symptoms appear  - doxycycline hyclate (VIBRAMYCIN) 100 MG capsule; Take 1 capsule (100 mg) by mouth 2 times daily for 7 days    Provider  Link to [facility] Help Grid :[id number]}     BMI:   Estimated body mass index is 28.35 kg/m  as calculated from the following:    Height as of 1/6/22: 1.702 m (5' 7\").    Weight as of 1/6/22: 82.1 kg (181 lb).     Patient was agreeable to this plan and had no further questions.  There are no Patient Instructions on file for this visit.    No follow-ups on file.    Radha Schwab MD  [facility]   Ed is a 87 year old who presents for the following health issues     HPI     Concern - Ticks  Onset: Last night  Description: One tick in jar and one imbedded in back  Intensity: moderate  Progression of Symptoms:  same  Accompanying Signs & Symptoms: One taken off by patient leaving a red nicolle, other imbedded in his lower back  Previous history of similar problem: na  Precipitating factors:        Worsened by: na  Alleviating factors:        Improved by: na  Therapies tried and outcome:  none       Review of Systems   Constitutional, HEENT, cardiovascular, pulmonary, gi and gu systems are negative, except as otherwise noted.      Objective    /86   Pulse 67   Temp 98.6  F (37  C)   Resp 20   There is no height or weight on file to calculate BMI.  Physical Exam   GENERAL: healthy, alert and no distress  RESP: lungs clear to auscultation - no rales, rhonchi or wheezes  CV: regular rate and rhythm, normal S1 S2, no S3 or S4, no murmur, click or rub, no peripheral edema and peripheral pulses strong  SKIN: erythema - axilla and " back  PSYCH: mentation appears normal, affect normal/bright    No results found for any visits on 05/25/22.

## 2022-05-25 NOTE — TELEPHONE ENCOUNTER
"Overbook request for today, [place] is full, patient anxious about the bite being on the same arm as his lymphedema..  [phone]    Reason for Disposition    Patient wants to be seen    Answer Assessment - Initial Assessment Questions  1. TYPE of TICK: \"Is it a wood tick or a deer tick?\" If unsure, ask: \"What size was the tick?\" \"Did it look more like a watermelon seed or a poppy seed?\"       watermelon  2. LOCATION: \"Where is the tick bite located?\"       Right arm back side and he has lympadema in that arm from breast cancer  3. ONSET: \"How long do you think the tick was attached before you removed it?\" (Hours or days)       Overnight  He was scratching his arm all night  4. TETANUS: \"When was the last tetanus booster?\"       unknown  5. PREGNANCY: \"Is there any chance you are pregnant?\" \"When was your last menstrual period?\"      no    Protocols used: TICK BITE-A-OH      "

## 2022-05-25 NOTE — NURSING NOTE
"Chief Complaint   Patient presents with     Insect Bites       Initial There were no vitals taken for this visit. Estimated body mass index is 28.35 kg/m  as calculated from the following:    Height as of 1/6/22: 1.702 m (5' 7\").    Weight as of 1/6/22: 82.1 kg (181 lb).  Medication Reconciliation: complete  Yee Watters LPN  "

## 2022-05-25 NOTE — TELEPHONE ENCOUNTER
Pt was already seen today stated he didn't need to be seen in [place] this week anymore as he received care in [place] for his embedded Tick

## 2022-08-08 ENCOUNTER — NURSE TRIAGE (OUTPATIENT)
Dept: FAMILY MEDICINE | Facility: OTHER | Age: 87
End: 2022-08-08

## 2022-08-08 ENCOUNTER — OFFICE VISIT (OUTPATIENT)
Dept: FAMILY MEDICINE | Facility: OTHER | Age: 87
End: 2022-08-08
Attending: FAMILY MEDICINE
Payer: COMMERCIAL

## 2022-08-08 VITALS
HEART RATE: 76 BPM | SYSTOLIC BLOOD PRESSURE: 128 MMHG | DIASTOLIC BLOOD PRESSURE: 68 MMHG | TEMPERATURE: 98.5 F | WEIGHT: 165 LBS | BODY MASS INDEX: 25.84 KG/M2 | OXYGEN SATURATION: 99 %

## 2022-08-08 DIAGNOSIS — S10.86XA INSECT BITE OF OTHER PART OF NECK, INITIAL ENCOUNTER: Primary | ICD-10-CM
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
## 2022-08-08 LAB
ALBUMIN SERPL-MCNC: 3.6 G/DL (ref 3.4–5)
ALP SERPL-CCNC: 89 U/L (ref 40–150)
ALT SERPL W P-5'-P-CCNC: 21 U/L (ref 0–70)
ANION GAP SERPL CALCULATED.3IONS-SCNC: 7 MMOL/L (ref 3–14)
AST SERPL W P-5'-P-CCNC: 26 U/L (ref 0–45)
BASOPHILS # BLD AUTO: 0.1 10E3/UL (ref 0–0.2)
BASOPHILS NFR BLD AUTO: 1 %
BILIRUB SERPL-MCNC: 0.3 MG/DL (ref 0.2–1.3)
BUN SERPL-MCNC: 19 MG/DL (ref 7–30)
CALCIUM SERPL-MCNC: 8.8 MG/DL (ref 8.5–10.1)
CHLORIDE BLD-SCNC: 107 MMOL/L (ref 94–109)
CO2 SERPL-SCNC: 25 MMOL/L (ref 20–32)
CREAT SERPL-MCNC: 1.06 MG/DL (ref 0.66–1.25)
EOSINOPHIL # BLD AUTO: 0.7 10E3/UL (ref 0–0.7)
EOSINOPHIL NFR BLD AUTO: 7 %
ERYTHROCYTE [DISTWIDTH] IN BLOOD BY AUTOMATED COUNT: 14.5 % (ref 10–15)
GFR SERPL CREATININE-BSD FRML MDRD: 68 ML/MIN/1.73M2
GLUCOSE BLD-MCNC: 148 MG/DL (ref 70–99)
HCT VFR BLD AUTO: 36.5 % (ref 40–53)
HGB BLD-MCNC: 11.7 G/DL (ref 13.3–17.7)
IMM GRANULOCYTES # BLD: 0 10E3/UL
IMM GRANULOCYTES NFR BLD: 0 %
LYMPHOCYTES # BLD AUTO: 2.5 10E3/UL (ref 0.8–5.3)
LYMPHOCYTES NFR BLD AUTO: 25 %
MCH RBC QN AUTO: 29.9 PG (ref 26.5–33)
MCHC RBC AUTO-ENTMCNC: 32.1 G/DL (ref 31.5–36.5)
MCV RBC AUTO: 93 FL (ref 78–100)
MONOCYTES # BLD AUTO: 0.8 10E3/UL (ref 0–1.3)
MONOCYTES NFR BLD AUTO: 8 %
NEUTROPHILS # BLD AUTO: 6.1 10E3/UL (ref 1.6–8.3)
NEUTROPHILS NFR BLD AUTO: 59 %
NRBC # BLD AUTO: 0 10E3/UL
NRBC BLD AUTO-RTO: 0 /100
PLATELET # BLD AUTO: 274 10E3/UL (ref 150–450)
POTASSIUM BLD-SCNC: 4.1 MMOL/L (ref 3.4–5.3)
PROT SERPL-MCNC: 7.6 G/DL (ref 6.8–8.8)
RBC # BLD AUTO: 3.91 10E6/UL (ref 4.4–5.9)
SODIUM SERPL-SCNC: 139 MMOL/L (ref 133–144)
WBC # BLD AUTO: 10.2 10E3/UL (ref 4–11)

## 2022-08-08 PROCEDURE — 99214 OFFICE O/P EST MOD 30 MIN: CPT | Performed by: FAMILY MEDICINE

## 2022-08-08 PROCEDURE — 86753 PROTOZOA ANTIBODY NOS: CPT | Mod: ZL | Performed by: FAMILY MEDICINE

## 2022-08-08 PROCEDURE — 85025 COMPLETE CBC W/AUTO DIFF WBC: CPT | Mod: ZL | Performed by: FAMILY MEDICINE

## 2022-08-08 PROCEDURE — G0463 HOSPITAL OUTPT CLINIC VISIT: HCPCS

## 2022-08-08 PROCEDURE — 87798 DETECT AGENT NOS DNA AMP: CPT | Mod: ZL | Performed by: FAMILY MEDICINE

## 2022-08-08 PROCEDURE — 80053 COMPREHEN METABOLIC PANEL: CPT | Mod: ZL | Performed by: FAMILY MEDICINE

## 2022-08-08 PROCEDURE — 36415 COLL VENOUS BLD VENIPUNCTURE: CPT | Mod: ZL | Performed by: FAMILY MEDICINE

## 2022-08-08 PROCEDURE — 86618 LYME DISEASE ANTIBODY: CPT | Mod: ZL | Performed by: FAMILY MEDICINE

## 2022-08-08 RX ORDER — DOXYCYCLINE 100 MG/1
100 CAPSULE ORAL 2 TIMES DAILY
Qty: 20 CAPSULE | Refills: 0 | Status: SHIPPED | OUTPATIENT
Start: 2022-08-08 | End: 2022-08-18

## 2022-08-08 ASSESSMENT — ENCOUNTER SYMPTOMS
CHILLS: 0
MYALGIAS: 0
PALPITATIONS: 0
ABDOMINAL PAIN: 0
FEVER: 0
WHEEZING: 0
SHORTNESS OF BREATH: 0
NECK PAIN: 1

## 2022-08-08 ASSESSMENT — PAIN SCALES - GENERAL: PAINLEVEL: NO PAIN (0)

## 2022-08-08 NOTE — PROGRESS NOTES
"  Assessment & Plan     Insect bite of other part of neck, initial encounter  [place] feel \"like himself\", nicolle consistent with insect bite. Will tx empirically with doxycycline while waiting for labs   WBC wnl. LFTs wnl  - Lyme Disease Total Abs Bld with Reflex to Confirm CLIA  - Ehrlichia Anaplasma Sp by PCR  - Babesia antibody IgG IgM  - Comprehensive metabolic panel (BMP + Alb, Alk Phos, ALT, AST, Total. Bili, TP)  - CBC with platelets and differential  - doxycycline hyclate (VIBRAMYCIN) 100 MG capsule; Take 1 capsule (100 mg) by mouth 2 times daily for 10 days       See Patient Instructions    Return if symptoms worsen or fail to improve.    Domonique Bryant MD  [facility]   Ed is a 88 year old, presenting for the following health issues:  Insect Bites      HPI     Rash - tic bite  Onset/Duration: Thursday   Description  Location: back of neck   Character: red  Itching: no  Intensity:  moderate  Progression of Symptoms:  same  Accompanying signs and symptoms: pain in neck   Fever: No  Body aches or joint pain: No  Sore throat symptoms: No  Recent cold symptoms: No  History:           Previous episodes of similar rash: None  New exposures:  None  Recent travel: No  Exposure to similar rash: No  Precipitating or alleviating factors: NA  Therapies tried and outcome: hot and cold     - mowing the yard and had stiff neck   - Friday when to chiropractor   - no known tick bite   [place]     Review of Systems   Constitutional: Negative for chills and fever.        Feels \"off\"   HENT: Negative for congestion.    Respiratory: Negative for shortness of breath and wheezing.    Cardiovascular: Negative for chest pain and palpitations.   Gastrointestinal: Negative for abdominal pain.   Musculoskeletal: Positive for neck pain. Negative for myalgias.   Skin: Positive for rash.          Objective    /68   Pulse 76   Temp 98.5  F " (36.9  C)   Wt 74.8 kg (165 lb)   SpO2 99%   BMI 25.84 kg/m    Body mass index is 25.84 kg/m .  Physical Exam  Constitutional:       General: He is not in acute distress.     Appearance: He is not ill-appearing.   Neck:      Comments: Moves neck with some discomfort most likely MSK  Cardiovascular:      Rate and Rhythm: Normal rate and regular rhythm.      Pulses: Normal pulses.      Heart sounds: No murmur heard.  Pulmonary:      Effort: Pulmonary effort is normal. No respiratory distress.      Breath sounds: No wheezing or rales.   Musculoskeletal:      Cervical back: Normal range of motion.      Comments: Right arm: lymphedema, chronic and unchanged   Skin:     Comments: Back of neck: nodule with rectangular area with some crusting, surrounded by erythema. Consistent with insect bite    Neurological:      Mental Status: He is alert.       Results for orders placed or performed in visit on 08/08/22 (from the past 24 hour(s))   Comprehensive metabolic panel (BMP + Alb, Alk Phos, ALT, AST, Total. Bili, TP)   Result Value Ref Range    Sodium 139 133 - 144 mmol/L    Potassium 4.1 3.4 - 5.3 mmol/L    Chloride 107 94 - 109 mmol/L    Carbon Dioxide (CO2) 25 20 - 32 mmol/L    Anion Gap 7 3 - 14 mmol/L    Urea Nitrogen 19 7 - 30 mg/dL    Creatinine 1.06 0.66 - 1.25 mg/dL    Calcium 8.8 8.5 - 10.1 mg/dL    Glucose 148 (H) 70 - 99 mg/dL    Alkaline Phosphatase 89 40 - 150 U/L    AST 26 0 - 45 U/L    ALT 21 0 - 70 U/L    Protein Total 7.6 6.8 - 8.8 g/dL    Albumin 3.6 3.4 - 5.0 g/dL    Bilirubin Total 0.3 0.2 - 1.3 mg/dL    GFR Estimate 68 >60 mL/min/1.73m2   CBC with platelets and differential    Narrative    The following orders were created for panel order CBC with platelets and differential.  Procedure                               Abnormality         Status                     ---------                               -----------         ------                     CBC with platelets and d...[[id number]]  Abnormal             Final result                 Please view results for these tests on the individual orders.   CBC with platelets and differential   Result Value Ref Range    WBC Count 10.2 4.0 - 11.0 10e3/uL    RBC Count 3.91 (L) 4.40 - 5.90 10e6/uL    Hemoglobin 11.7 (L) 13.3 - 17.7 g/dL    Hematocrit 36.5 (L) 40.0 - 53.0 %    MCV 93 78 - 100 fL    MCH 29.9 26.5 - 33.0 pg    MCHC 32.1 31.5 - 36.5 g/dL    RDW 14.5 10.0 - 15.0 %    Platelet Count 274 150 - 450 10e3/uL    % Neutrophils 59 %    % Lymphocytes 25 %    % Monocytes 8 %    % Eosinophils 7 %    % Basophils 1 %    % Immature Granulocytes 0 %    NRBCs per 100 WBC 0 <1 /100    Absolute Neutrophils 6.1 1.6 - 8.3 10e3/uL    Absolute Lymphocytes 2.5 0.8 - 5.3 10e3/uL    Absolute Monocytes 0.8 0.0 - 1.3 10e3/uL    Absolute Eosinophils 0.7 0.0 - 0.7 10e3/uL    Absolute Basophils 0.1 0.0 - 0.2 10e3/uL    Absolute Immature Granulocytes 0.0 <=0.4 10e3/uL    Absolute NRBCs 0.0 10e3/uL                   .  ..

## 2022-08-08 NOTE — PATIENT INSTRUCTIONS
We will call you with your lab results.     Start doxycycline twice per day for the next 10 days

## 2022-08-08 NOTE — TELEPHONE ENCOUNTER
"Patient is having trouble moving his neck from left to right    Reason for Disposition    Patient wants to be seen    Answer Assessment - Initial Assessment Questions  1. TYPE of TICK: \"Is it a wood tick or a deer tick?\" (e.g., deer tick, wood tick; unsure)      Wood tick  2. SIZE of TICK: \"How big is the tick?\" (e.g., size of poppy seed, apple seed, watermelon seed; unsure) Note: Deer ticks can be the size of a poppy seed (nymph) or an apple seed (adult).        watermelon  3. ENGORGED: \"Did the tick look flat or engorged (full, swollen)?\" (e.g., flat, engorged; unsure)      no  4. LOCATION: \"Where is the tick bite located?\"       Back of neck  5. ONSET: \"How long do you think the tick was attached before you removed it?\" (e.g., 5 hours, 2 days)       Friday  6. APPEARANCE of BITE or RASH: \"What does the site look like?\"      Swollen red   7. PREGNANCY: \"Is there any chance you are pregnant?\" \"When was your last menstrual period?\"      no    Protocols used: TICK BITE-A-OH      "

## 2022-08-08 NOTE — NURSING NOTE
"Chief Complaint   Patient presents with     Insect Bites       Initial /68   Pulse 76   Temp 98.5  F (36.9  C)   Wt 74.8 kg (165 lb)   SpO2 99%   BMI 25.84 kg/m   Estimated body mass index is 25.84 kg/m  as calculated from the following:    Height as of 1/6/22: 1.702 m (5' 7\").    Weight as of this encounter: 74.8 kg (165 lb).  Medication Reconciliation: complete  Tonia Noriega LPN  "

## 2022-08-10 LAB — B BURGDOR IGG+IGM SER QL: 0.3

## 2022-08-14 LAB
B MICROTI IGG TITR SER: ABNORMAL {TITER}
B MICROTI IGM TITR SER: ABNORMAL {TITER}
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
## 2022-08-15 DIAGNOSIS — S10.86XA INSECT BITE OF OTHER PART OF NECK, INITIAL ENCOUNTER: Primary | ICD-10-CM

## 2022-08-16 ENCOUNTER — LAB (OUTPATIENT)
Dept: LAB | Facility: OTHER | Age: 87
End: 2022-08-16
Payer: MEDICARE

## 2022-08-16 DIAGNOSIS — S10.86XA INSECT BITE OF OTHER PART OF NECK, INITIAL ENCOUNTER: ICD-10-CM
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
## 2022-08-16 PROCEDURE — 87798 DETECT AGENT NOS DNA AMP: CPT | Mod: ZL

## 2022-08-16 PROCEDURE — 36415 COLL VENOUS BLD VENIPUNCTURE: CPT | Mod: ZL

## 2022-08-16 PROCEDURE — 87798 DETECT AGENT NOS DNA AMP: CPT | Mod: ZL,91

## 2022-08-20 LAB
A PHAGOCYTOPH DNA BLD QL NAA+PROBE: NOT DETECTED
B MICROTI DNA BLD QL NAA+PROBE: NOT DETECTED
BABESIA DNA BLD QL NAA+PROBE: NOT DETECTED
E CHAFFEENSIS DNA BLD QL NAA+PROBE: NOT DETECTED
E EWINGII DNA SPEC QL NAA+PROBE: NOT DETECTED
EHRLICHIA DNA SPEC QL NAA+PROBE: NOT DETECTED

## 2022-08-31 DIAGNOSIS — F41.9 ANXIETY: ICD-10-CM

## 2022-08-31 RX ORDER — ALPRAZOLAM 0.5 MG
TABLET ORAL
Qty: 60 TABLET | Refills: 0 | Status: SHIPPED | OUTPATIENT
Start: 2022-08-31 | End: 2022-10-11

## 2022-08-31 NOTE — TELEPHONE ENCOUNTER
Xanax       Last Written Prescription Date:  3/2/22  Last Fill Quantity: 60,   # refills: 5  Last Office Visit: 8/8/22  Future Office visit:    Next 5 appointments (look out 90 days)    Nov 16, 2022 10:50 AM  (Arrive by 10:35 AM)  Return Visit with Radha Sales NP  [facility] ([facility] [address]  [phone]

## 2022-10-28 ENCOUNTER — IMMUNIZATION (OUTPATIENT)
Dept: FAMILY MEDICINE | Facility: OTHER | Age: 87
End: 2022-10-28
Attending: FAMILY MEDICINE
Payer: MEDICARE

## 2022-10-28 DIAGNOSIS — Z23 NEED FOR PROPHYLACTIC VACCINATION AND INOCULATION AGAINST INFLUENZA: Primary | ICD-10-CM

## 2022-10-28 PROCEDURE — 91312 COVID-19,PF,PFIZER BOOSTER BIVALENT: CPT

## 2022-10-28 PROCEDURE — 0124A COVID-19,PF,PFIZER BOOSTER BIVALENT: CPT

## 2022-10-28 PROCEDURE — G0008 ADMIN INFLUENZA VIRUS VAC: HCPCS

## 2022-11-12 DIAGNOSIS — E03.9 HYPOTHYROIDISM, UNSPECIFIED TYPE: ICD-10-CM

## 2022-11-15 RX ORDER — LEVOTHYROXINE SODIUM 88 UG/1
TABLET ORAL
Qty: 30 TABLET | Refills: 1 | Status: SHIPPED | OUTPATIENT
Start: 2022-11-15 | End: 2023-01-18

## 2022-11-16 ENCOUNTER — LAB (OUTPATIENT)
Dept: LAB | Facility: OTHER | Age: 87
End: 2022-11-16
Attending: NURSE PRACTITIONER
Payer: MEDICARE

## 2022-11-16 ENCOUNTER — ONCOLOGY VISIT (OUTPATIENT)
Dept: ONCOLOGY | Facility: OTHER | Age: 87
End: 2022-11-16
Attending: NURSE PRACTITIONER
Payer: MEDICARE

## 2022-11-16 VITALS
SYSTOLIC BLOOD PRESSURE: 110 MMHG | RESPIRATION RATE: 20 BRPM | DIASTOLIC BLOOD PRESSURE: 60 MMHG | WEIGHT: 172.4 LBS | TEMPERATURE: 98.2 F | BODY MASS INDEX: 27.06 KG/M2 | HEIGHT: 67 IN | OXYGEN SATURATION: 96 % | HEART RATE: 71 BPM

## 2022-11-16 DIAGNOSIS — Z15.09 BRCA2 POSITIVE: ICD-10-CM

## 2022-11-16 DIAGNOSIS — E03.9 HYPOTHYROIDISM, UNSPECIFIED TYPE: ICD-10-CM

## 2022-11-16 DIAGNOSIS — Z15.01 BRCA2 POSITIVE: ICD-10-CM

## 2022-11-16 DIAGNOSIS — Z85.3 HISTORY OF RIGHT BREAST CANCER: Primary | ICD-10-CM

## 2022-11-16 DIAGNOSIS — Z12.5 SPECIAL SCREENING FOR MALIGNANT NEOPLASM OF PROSTATE: ICD-10-CM

## 2022-11-16 DIAGNOSIS — Z85.3 HISTORY OF RIGHT BREAST CANCER: ICD-10-CM

## 2022-11-16 LAB
ALBUMIN SERPL BCG-MCNC: 4.3 G/DL (ref 3.5–5.2)
ALP SERPL-CCNC: 76 U/L (ref 40–129)
ALT SERPL W P-5'-P-CCNC: 15 U/L (ref 10–50)
ANION GAP SERPL CALCULATED.3IONS-SCNC: 11 MMOL/L (ref 7–15)
AST SERPL W P-5'-P-CCNC: 26 U/L (ref 10–50)
BASOPHILS # BLD AUTO: 0.1 10E3/UL (ref 0–0.2)
BASOPHILS NFR BLD AUTO: 1 %
BILIRUB SERPL-MCNC: 0.3 MG/DL
BUN SERPL-MCNC: 23 MG/DL (ref 8–23)
CALCIUM SERPL-MCNC: 9 MG/DL (ref 8.8–10.2)
CHLORIDE SERPL-SCNC: 105 MMOL/L (ref 98–107)
CREAT SERPL-MCNC: 1.28 MG/DL (ref 0.67–1.17)
DEPRECATED HCO3 PLAS-SCNC: 23 MMOL/L (ref 22–29)
EOSINOPHIL # BLD AUTO: 0.5 10E3/UL (ref 0–0.7)
EOSINOPHIL NFR BLD AUTO: 5 %
ERYTHROCYTE [DISTWIDTH] IN BLOOD BY AUTOMATED COUNT: 14.9 % (ref 10–15)
GFR SERPL CREATININE-BSD FRML MDRD: 54 ML/MIN/1.73M2
GLUCOSE SERPL-MCNC: 112 MG/DL (ref 70–99)
HCT VFR BLD AUTO: 37.3 % (ref 40–53)
HGB BLD-MCNC: 11.7 G/DL (ref 13.3–17.7)
IMM GRANULOCYTES # BLD: 0 10E3/UL
IMM GRANULOCYTES NFR BLD: 0 %
LYMPHOCYTES # BLD AUTO: 2.7 10E3/UL (ref 0.8–5.3)
LYMPHOCYTES NFR BLD AUTO: 29 %
MCH RBC QN AUTO: 29.5 PG (ref 26.5–33)
MCHC RBC AUTO-ENTMCNC: 31.4 G/DL (ref 31.5–36.5)
MCV RBC AUTO: 94 FL (ref 78–100)
MONOCYTES # BLD AUTO: 0.8 10E3/UL (ref 0–1.3)
MONOCYTES NFR BLD AUTO: 8 %
NEUTROPHILS # BLD AUTO: 5.5 10E3/UL (ref 1.6–8.3)
NEUTROPHILS NFR BLD AUTO: 57 %
NRBC # BLD AUTO: 0 10E3/UL
NRBC BLD AUTO-RTO: 0 /100
PLATELET # BLD AUTO: 289 10E3/UL (ref 150–450)
POTASSIUM SERPL-SCNC: 4.6 MMOL/L (ref 3.4–5.3)
PROT SERPL-MCNC: 7.4 G/DL (ref 6.4–8.3)
PSA SERPL-MCNC: 0.9 NG/ML
RBC # BLD AUTO: 3.97 10E6/UL (ref 4.4–5.9)
SODIUM SERPL-SCNC: 139 MMOL/L (ref 136–145)
T4 FREE SERPL-MCNC: 1.1 NG/DL (ref 0.9–1.7)
TSH SERPL DL<=0.005 MIU/L-ACNC: 6.01 UIU/ML (ref 0.3–4.2)
WBC # BLD AUTO: 9.6 10E3/UL (ref 4–11)

## 2022-11-16 PROCEDURE — 99213 OFFICE O/P EST LOW 20 MIN: CPT | Performed by: NURSE PRACTITIONER

## 2022-11-16 PROCEDURE — 85025 COMPLETE CBC W/AUTO DIFF WBC: CPT | Mod: ZL

## 2022-11-16 PROCEDURE — 36415 COLL VENOUS BLD VENIPUNCTURE: CPT | Mod: ZL

## 2022-11-16 PROCEDURE — 84439 ASSAY OF FREE THYROXINE: CPT | Mod: ZL

## 2022-11-16 PROCEDURE — G0463 HOSPITAL OUTPT CLINIC VISIT: HCPCS

## 2022-11-16 PROCEDURE — G0103 PSA SCREENING: HCPCS | Mod: ZL

## 2022-11-16 PROCEDURE — 80053 COMPREHEN METABOLIC PANEL: CPT | Mod: ZL

## 2022-11-16 PROCEDURE — 84443 ASSAY THYROID STIM HORMONE: CPT | Mod: ZL

## 2022-11-16 RX ORDER — ASPIRIN 325 MG
325 TABLET, DELAYED RELEASE (ENTERIC COATED) ORAL
Qty: 30 TABLET | Refills: 0 | COMMUNITY
Start: 2022-11-16

## 2022-11-16 ASSESSMENT — PAIN SCALES - GENERAL: PAINLEVEL: NO PAIN (0)

## 2022-11-16 NOTE — NURSING NOTE
"Oncology Rooming Note    November 16, 2022 1:03 PM   Lio Daly is a 88 year old male who presents for:    Chief Complaint   Patient presents with     Oncology Clinic Visit     Follow up History of right breast cancer       Initial Vitals: /60   Pulse 71   Temp 98.2  F (36.8  C) (Temporal)   Resp 20   Ht 1.702 m (5' 7\")   Wt 78.2 kg (172 lb 6.4 oz)   SpO2 96%   BMI 27.00 kg/m   Estimated body mass index is 27 kg/m  as calculated from the following:    Height as of this encounter: 1.702 m (5' 7\").    Weight as of this encounter: 78.2 kg (172 lb 6.4 oz). Body surface area is 1.92 meters squared.  No Pain (0) Comment: Data Unavailable   No LMP for male patient.  Allergies reviewed: Yes  Medications reviewed: Yes    Medications: Medication refills not needed today.  Pharmacy name entered into D1G: [facility] - [place], [place] - [address] LPN            "

## 2022-11-16 NOTE — PROGRESS NOTES
Oncology Follow-up Visit:  November 16, 2022    Reason for Visit:  Patient presents with:  Oncology Clinic Visit: Follow up History of right breast cancer       Nursing Note and documentation reviewed: yes    HPI:  This is an 88-year-old male patient who  presents to the oncology clinic today in follow-up of a history of right breast cancer.  Patient underwent a right mastectomy with axillary node dissection in July 2015. He was placed on 20 mg of tamoxifen and remains on this.       He presents to the clinic today stating he is doing excellent.  He offers no new complaints.  He denies any changes to the chest wall or breast tissue including any new lumps or skin changes.  He continues with some lymphedema to the right upper extremity but states he no longer has been wearing the compression sleeve or glove.  The lymphedema does not seem to be any worse.  He is on aspirin 325 mg 3 times a week.  He is due to see his PCP in January.  He continues on the tamoxifen and is tolerating well.    Oncologic History:     Patient had presented with complaints of right nipple irritation over a six-month period and was noted to have a mass in the right breast. Mammogram was completed followed by biopsy and pathology showed an invasive carcinoma. On 7/27/2015, he underwent a right modified radical mastectomy with axillary node dissection by Dr. Garsia. 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 DCIS was present, grade 2 with expansive comedonecrosis. Node dissection revealed 1/17 nodes revealing micrometastasis. Tumor was ER/IL positive, HER-2/thomas negative. Patient was staged pathologic T1c N1mi M0.      At consultation, patient was not interested in adjuvant chemotherapy and was placed on tamoxifen 20 mg daily.      BRCA 2+      Current Chemo Regime/TX: Tamoxifen 20mg daily initiated 8/2015  Current Cycle: n/a  #  of completed cycles: n/a      Previous treatment:  n/a     Past Medical History:   Diagnosis Date     Arthritis      Cancer (H)      Dysphagia 06/28/2004     HTN (hypertension)      Need for prophylactic vaccination and inoculation 12/10/2001     Need for prophylactic vaccination and inoculation, 11/06/2003     Other and unspecified hyperlipidemia 05/01/2001     Prediabetes      Shortness of breath 03/28/2006     Shoulder pain 06/15/2012     Thyroid disease      Unspecified essential hypertension 05/18/2000       Past Surgical History:   Procedure Laterality Date     CARDIAC SURGERY      angiogram 1992 [facility]     COLONOSCOPY      1998     GENITOURINARY SURGERY      prostate bx [facility]     GENITOURINARY SURGERY      Prostate bx [facility]     GI SURGERY      EGD     GI SURGERY      Pt has EGD with removal of food from esophagus     MASTECTOMY SIMPLE, SENTINEL NODE, COMBINED Right 7/27/2015    Procedure: COMBINED MASTECTOMY SIMPLE, SENTINEL NODE;  Surgeon: Aliyah Garsia MD;  Location: [facility]     ORTHOPEDIC SURGERY      bilateral knee replacements       Family History   Problem Relation Age of Onset     Cancer - colorectal Mother      Eye Disorder Mother         cataracts     Musculoskeletal Disorder Mother         CTS     Colon Cancer Mother      Other Cancer Mother      Cancer Father         lung cancer     Genitourinary Problems Father         prostate problems     Other Cancer Father         lung     C.A.D. Brother         CABG     Eye Disorder Brother         cataracts     Breast Cancer Other      Ovarian Cancer Other      Pancreatic Cancer Other      Diabetes No family hx of      Coronary Artery Disease No family hx of      Cerebrovascular Disease No family hx of      Hyperlipidemia No family hx of      Hypertension No family hx of      Prostate Cancer No family hx of      Thyroid Disease No family hx of      Genetic Disorder No family hx of      Asthma No family hx of      Anesthesia Reaction No family hx of         Social History     Socioeconomic History     Marital status:      Spouse name: Marisa     Number of children: Not on file     Years of education: Not on file     Highest education level: Not on file   Occupational History     Not on file   Tobacco Use     Smoking status: Former     Packs/day: 0.50     Years: 47.00     Pack years: 23.50     Types: Cigarettes     Start date: 1948     Quit date: 1995     Years since quittin.8     Smokeless tobacco: Never   Substance and Sexual Activity     Alcohol use: No     Comment: former     Drug use: No     Sexual activity: Not on file   Other Topics Concern      Service Not Asked     Blood Transfusions Not Asked     Caffeine Concern Yes     Comment: coffee, 2 cups daily     Occupational Exposure Not Asked     Hobby Hazards Not Asked     Sleep Concern Not Asked     Stress Concern Not Asked     Weight Concern Not Asked     Special Diet Not Asked     Back Care Not Asked     Exercise Not Asked     Bike Helmet Not Asked     Seat Belt Not Asked     Self-Exams Not Asked     Parent/sibling w/ CABG, MI or angioplasty before 65F 55M? No   Social History Narrative     Not on file     Social Determinants of Health     Financial Resource Strain: Not on file   Food Insecurity: Not on file   Transportation Needs: Not on file   Physical Activity: Not on file   Stress: Not on file   Social Connections: Not on file   Intimate Partner Violence: Not on file   Housing Stability: Not on file       Current Outpatient Medications   Medication     acetaminophen (TYLENOL) 325 MG tablet     ALPRAZolam (XANAX) 0.5 MG tablet     Ascorbic Acid (VITAMIN C PO)     atenolol (TENORMIN) 50 MG tablet     levothyroxine (SYNTHROID/LEVOTHROID) 88 MCG tablet     omeprazole (PRILOSEC) 20 MG DR capsule     order for DME     pravastatin (PRAVACHOL) 40 MG tablet     sennosides (SENOKOT) 8.6 MG tablet     tamoxifen (NOLVADEX) 20 MG tablet     tamsulosin (FLOMAX) 0.4 MG capsule      "triamcinolone (ARISTOCORT HP) 0.5 % external cream     No current facility-administered medications for this visit.        No Known Allergies    Review Of Systems:  Constitutional:    denies fever, weight changes, chills, and night sweats.  Eyes:    denies blurred or double vision  Ears/Nose/Throat:   denies ear pain, nose problems, difficulty swallowing  Respiratory:   denies shortness of breath, cough  Skin:   denies rash, lesions  Breast/Chest wall:   denies pain, lumps   Cardiovascular:   denies chest pain, palpitations, edema  Gastrointestinal:   denies abdominal pain, bloating, nausea, early satiety; no change in bowel habits or blood in stool  Genitourinary:   denies blood in urine; slow stream  Musculoskeletal:    denies new muscle pain, bone pain  Neurologic:   denies lightheadedness, headaches  Psychiatric:   denies anxiety, depression  Hematologic/Lymphatic/Immunologic:   denies easy bruising, easy bleeding, lumps or bumps noted  Endocrine:   Denies increased thirst      Physical Exam:  /60   Pulse 71   Temp 98.2  F (36.8  C) (Temporal)   Resp 20   Ht 1.702 m (5' 7\")   Wt 78.2 kg (172 lb 6.4 oz)   SpO2 96%   BMI 27.00 kg/m      GENERAL APPEARANCE: Healthy, elderly male, alert and in no acute distress.  HEENT: Normocephalic, Sclerae anicteric.  No oral lesions or thrush.  NECK:   No asymmetry or masses, no thyromegaly.  LYMPHATICS: No palpable cervical, supraclavicular, axillary, or inguinal nodes   RESP: Lungs with expiratory wheezes bilaterally, respirations regular and easy  CARDIOVASCULAR: Regular rate and rhythm. Normal S1, S2; no murmur, gallop, or rub.  ABDOMEN: Soft, nontender. Bowel sounds auscultated all 4 quadrants. No palpable organomegaly or masses.  BREAST/Chest wall: Right: No masses, erythema, skin thickening, skin dimpling, swelling Left: No masses, nipple discharge, erythema, skin thickening, nipple inversion, skin dimpling, swelling; breasts were examined in the supine and " sitting position  MUSCULOSKELETAL: Lymphedema ongoing to the right upper extremity, no edema of bilateral lower extremities.  SKIN: No suspicious lesions or rashes to exposed skin  NEURO: Alert and oriented x 3.  Gait steady.  PSYCHIATRIC: Mentation and affect appear normal.  Mood appropriate.    Laboratory:  Results for orders placed or performed in visit on 11/16/22   TSH with free T4 reflex     Status: Abnormal   Result Value Ref Range    TSH 6.01 (H) 0.30 - 4.20 uIU/mL   Prostate Specific Antigen Screen     Status: None   Result Value Ref Range    Prostate Specific Antigen Screen 0.90 ng/mL    Narrative    This result is obtained using the Roche Elecsys total PSA method on the yohana [id number] immunoassay analyzer. Results obtained with different assay methods or kits cannot be used interchangeably.   Comprehensive metabolic panel     Status: Abnormal   Result Value Ref Range    Sodium 139 136 - 145 mmol/L    Potassium 4.6 3.4 - 5.3 mmol/L    Chloride 105 98 - 107 mmol/L    Carbon Dioxide (CO2) 23 22 - 29 mmol/L    Anion Gap 11 7 - 15 mmol/L    Urea Nitrogen 23.0 8.0 - 23.0 mg/dL    Creatinine 1.28 (H) 0.67 - 1.17 mg/dL    Calcium 9.0 8.8 - 10.2 mg/dL    Glucose 112 (H) 70 - 99 mg/dL    Alkaline Phosphatase 76 40 - 129 U/L    AST 26 10 - 50 U/L    ALT 15 10 - 50 U/L    Protein Total 7.4 6.4 - 8.3 g/dL    Albumin 4.3 3.5 - 5.2 g/dL    Bilirubin Total 0.3 <=1.2 mg/dL    GFR Estimate 54 (L) >60 mL/min/1.73m2   CBC with platelets and differential     Status: Abnormal   Result Value Ref Range    WBC Count 9.6 4.0 - 11.0 10e3/uL    RBC Count 3.97 (L) 4.40 - 5.90 10e6/uL    Hemoglobin 11.7 (L) 13.3 - 17.7 g/dL    Hematocrit 37.3 (L) 40.0 - 53.0 %    MCV 94 78 - 100 fL    MCH 29.5 26.5 - 33.0 pg    MCHC 31.4 (L) 31.5 - 36.5 g/dL    RDW 14.9 10.0 - 15.0 %    Platelet Count 289 150 - 450 10e3/uL    % Neutrophils 57 %    % Lymphocytes 29 %    % Monocytes 8 %    % Eosinophils 5 %    % Basophils 1 %    % Immature Granulocytes 0 %     NRBCs per 100 WBC 0 <1 /100    Absolute Neutrophils 5.5 1.6 - 8.3 10e3/uL    Absolute Lymphocytes 2.7 0.8 - 5.3 10e3/uL    Absolute Monocytes 0.8 0.0 - 1.3 10e3/uL    Absolute Eosinophils 0.5 0.0 - 0.7 10e3/uL    Absolute Basophils 0.1 0.0 - 0.2 10e3/uL    Absolute Immature Granulocytes 0.0 <=0.4 10e3/uL    Absolute NRBCs 0.0 10e3/uL   T4 free     Status: Normal   Result Value Ref Range    Free T4 1.10 0.90 - 1.70 ng/dL   CBC with Platelets & Differential     Status: Abnormal    Narrative    The following orders were created for panel order CBC with Platelets & Differential.  Procedure                               Abnormality         Status                     ---------                               -----------         ------                     CBC with platelets and d...[[id number]]  Abnormal            Final result                 Please view results for these tests on the individual orders.       Imaging Studies:  None for today's visit      ASSESSMENT/PLAN:    #1 history of breast cancer: History of  Stage 1, pT1c N1mi M0 carcinoma of the right breast diagnosed in July 2015.  He underwent mastectomy with node dissection with 1/17 nodes revealing micrometastases; tumor was 2cm, ER/NH positive, HER-2/thomas negative.  BRCA2 positive.  He'll continue with tamoxifen 20 mg daily and follow up in 1 year with a CBC, CMP and PSA.    #2 BRCA2 positive: PSA is within normal limits.      I encouraged patient to call with any questions or concerns.    Radha Sales, NP  APRN, FNP-BC, AOCNP

## 2022-11-18 ENCOUNTER — TELEPHONE (OUTPATIENT)
Dept: FAMILY MEDICINE | Facility: OTHER | Age: 87
End: 2022-11-18

## 2022-11-18 NOTE — TELEPHONE ENCOUNTER
Writer relayed Providers result (s) notes from labs 11/16/22  Patient relayed understanding.  No further questions or concerns at calls end.

## 2022-12-12 DIAGNOSIS — I10 HTN, GOAL BELOW 140/80: ICD-10-CM

## 2022-12-12 RX ORDER — ATENOLOL 50 MG/1
TABLET ORAL
Qty: 90 TABLET | Refills: 0 | Status: SHIPPED | OUTPATIENT
Start: 2022-12-12 | End: 2022-12-20

## 2022-12-20 DIAGNOSIS — I10 HTN, GOAL BELOW 140/80: ICD-10-CM

## 2022-12-20 RX ORDER — ATENOLOL 50 MG/1
TABLET ORAL
Qty: 90 TABLET | Refills: 0 | Status: SHIPPED | OUTPATIENT
Start: 2022-12-20 | End: 2023-06-12

## 2022-12-20 NOTE — DISCHARGE INSTRUCTIONS
Home number on file [phone] (home)  Is it ok to leave a message at this number(s)? Yes    Dr. Ely completed your procedure on 10/13/2017.    Current Pain Level (0-10 Scale): 5/10  Post Pain Level (0-10):  0/10    Radiology Discharge instructions for Steroid Injection    Activity Level:     Do not do any heavy activity or exercise for 24 hours.   Do not drive for 4 hours after your injection.  Diet:   Return to your normal diet.  Medications:   If you have stopped taking your Aspirin, Coumadin/Warfarin, Ibuprofen, or any   other blood thinner for this procedure you may resume in the morning unless   your primary care provider has given you other instructions.    Diabetics may see an increase in blood sugar after steroid injections. If you are concerned about your blood sugar, please contact your family doctor.    Site Care:  Remove the bandage and bathe or shower the morning after the procedure.      Please allow two weeks to experience improvement in your pain.  If you have any further issues, please contact your provider.    Call your Primary Care Provider if you have the following (if your primary care provider is not available please seek emergency care):   Nausea with vomiting   Severe headache   Drowsiness or confusion   Redness or drainage at the injection or puncture site   Temperature over 101 degrees F   Other concerns   Worsening back pain   Stiff neck    
show

## 2023-01-11 ENCOUNTER — OFFICE VISIT (OUTPATIENT)
Dept: FAMILY MEDICINE | Facility: OTHER | Age: 88
End: 2023-01-11
Attending: FAMILY MEDICINE
Payer: COMMERCIAL

## 2023-01-11 VITALS
BODY MASS INDEX: 27.57 KG/M2 | TEMPERATURE: 97.4 F | OXYGEN SATURATION: 96 % | WEIGHT: 176 LBS | DIASTOLIC BLOOD PRESSURE: 70 MMHG | HEART RATE: 66 BPM | SYSTOLIC BLOOD PRESSURE: 134 MMHG

## 2023-01-11 DIAGNOSIS — E78.5 HYPERLIPIDEMIA LDL GOAL <130: ICD-10-CM

## 2023-01-11 DIAGNOSIS — N40.0 PROSTATISM: ICD-10-CM

## 2023-01-11 DIAGNOSIS — E03.9 HYPOTHYROIDISM, UNSPECIFIED TYPE: Primary | ICD-10-CM

## 2023-01-11 DIAGNOSIS — Z00.00 ENCOUNTER FOR MEDICARE ANNUAL WELLNESS EXAM: ICD-10-CM

## 2023-01-11 LAB
ALBUMIN SERPL BCG-MCNC: 4.1 G/DL (ref 3.5–5.2)
ALP SERPL-CCNC: 71 U/L (ref 40–129)
ALT SERPL W P-5'-P-CCNC: 14 U/L (ref 10–50)
ANION GAP SERPL CALCULATED.3IONS-SCNC: 12 MMOL/L (ref 7–15)
AST SERPL W P-5'-P-CCNC: 25 U/L (ref 10–50)
BILIRUB SERPL-MCNC: 0.3 MG/DL
BUN SERPL-MCNC: 20 MG/DL (ref 8–23)
CALCIUM SERPL-MCNC: 9.2 MG/DL (ref 8.8–10.2)
CHLORIDE SERPL-SCNC: 104 MMOL/L (ref 98–107)
CHOLEST SERPL-MCNC: 155 MG/DL
CREAT SERPL-MCNC: 1.22 MG/DL (ref 0.67–1.17)
DEPRECATED HCO3 PLAS-SCNC: 24 MMOL/L (ref 22–29)
GFR SERPL CREATININE-BSD FRML MDRD: 57 ML/MIN/1.73M2
GLUCOSE SERPL-MCNC: 116 MG/DL (ref 70–99)
HDLC SERPL-MCNC: 56 MG/DL
LDLC SERPL CALC-MCNC: 77 MG/DL
NONHDLC SERPL-MCNC: 99 MG/DL
POTASSIUM SERPL-SCNC: 5.2 MMOL/L (ref 3.4–5.3)
PROT SERPL-MCNC: 7.1 G/DL (ref 6.4–8.3)
SODIUM SERPL-SCNC: 140 MMOL/L (ref 136–145)
T4 FREE SERPL-MCNC: 1.2 NG/DL (ref 0.9–1.7)
TRIGL SERPL-MCNC: 108 MG/DL
TSH SERPL DL<=0.005 MIU/L-ACNC: 5.92 UIU/ML (ref 0.3–4.2)

## 2023-01-11 PROCEDURE — 84439 ASSAY OF FREE THYROXINE: CPT | Mod: ZL | Performed by: FAMILY MEDICINE

## 2023-01-11 PROCEDURE — 84443 ASSAY THYROID STIM HORMONE: CPT | Mod: ZL | Performed by: FAMILY MEDICINE

## 2023-01-11 PROCEDURE — 80053 COMPREHEN METABOLIC PANEL: CPT | Mod: ZL | Performed by: FAMILY MEDICINE

## 2023-01-11 PROCEDURE — G0438 PPPS, INITIAL VISIT: HCPCS | Performed by: FAMILY MEDICINE

## 2023-01-11 PROCEDURE — 36415 COLL VENOUS BLD VENIPUNCTURE: CPT | Mod: ZL | Performed by: FAMILY MEDICINE

## 2023-01-11 PROCEDURE — 80061 LIPID PANEL: CPT | Mod: ZL | Performed by: FAMILY MEDICINE

## 2023-01-11 RX ORDER — DUTASTERIDE 0.5 MG/1
0.5 CAPSULE, LIQUID FILLED ORAL DAILY
Qty: 90 CAPSULE | Refills: 3 | Status: SHIPPED | OUTPATIENT
Start: 2023-01-11 | End: 2023-12-12

## 2023-01-11 ASSESSMENT — ACTIVITIES OF DAILY LIVING (ADL): CURRENT_FUNCTION: NO ASSISTANCE NEEDED

## 2023-01-11 ASSESSMENT — PAIN SCALES - GENERAL: PAINLEVEL: NO PAIN (0)

## 2023-01-11 NOTE — PATIENT INSTRUCTIONS
Patient Education   Personalized Prevention Plan  You are due for the preventive services outlined below.  Your care team is available to assist you in scheduling these services.  If you have already completed any of these items, please share that information with your care team to update in your medical record.  Health Maintenance Due   Topic Date Due     Zoster (Shingles) Vaccine (2 of 3) 02/03/2017     Annual Wellness Visit  01/16/2020     Cholesterol Lab  01/06/2023

## 2023-01-11 NOTE — LETTER
January 11, 2023       JERI Daly  [id number] [facility] [address]  [place] [address]        Dear ,    We are writing to inform you of your test results.    Your test results fall within the expected range(s) or remain unchanged from previous results.  Please continue with current treatment plan.    Resulted Orders   Comprehensive metabolic panel   Result Value Ref Range    Sodium 140 136 - 145 mmol/L    Potassium 5.2 3.4 - 5.3 mmol/L    Chloride 104 98 - 107 mmol/L    Carbon Dioxide (CO2) 24 22 - 29 mmol/L    Anion Gap 12 7 - 15 mmol/L    Urea Nitrogen 20.0 8.0 - 23.0 mg/dL    Creatinine 1.22 (H) 0.67 - 1.17 mg/dL    Calcium 9.2 8.8 - 10.2 mg/dL    Glucose 116 (H) 70 - 99 mg/dL    Alkaline Phosphatase 71 40 - 129 U/L    AST 25 10 - 50 U/L    ALT 14 10 - 50 U/L    Protein Total 7.1 6.4 - 8.3 g/dL    Albumin 4.1 3.5 - 5.2 g/dL    Bilirubin Total 0.3 <=1.2 mg/dL    GFR Estimate 57 (L) >60 mL/min/1.73m2      Comment:      Effective December 21, 2021 eGFRcr in adults is calculated using the 2021 CKD-EPI creatinine equation which includes age and gender (Claudia et al., NEJM, DOI: 10.1056/ZTQSgx0236339)   TSH with free T4 reflex   Result Value Ref Range    TSH 5.92 (H) 0.30 - 4.20 uIU/mL   Lipid Profile   Result Value Ref Range    Cholesterol 155 <200 mg/dL    Triglycerides 108 <150 mg/dL    Direct Measure HDL 56 >=40 mg/dL    LDL Cholesterol Calculated 77 <=100 mg/dL    Non HDL Cholesterol 99 <130 mg/dL    Narrative    Cholesterol  Desirable:  <200 mg/dL    Triglycerides  Normal:  Less than 150 mg/dL  Borderline High:  150-199 mg/dL  High:  200-499 mg/dL  Very High:  Greater than or equal to 500 mg/dL    Direct Measure HDL  Female:  Greater than or equal to 50 mg/dL   Male:  Greater than or equal to 40 mg/dL    LDL Cholesterol  Desirable:  <100mg/dL  Above Desirable:  100-129 mg/dL   Borderline High:  130-159 mg/dL   High:  160-189 mg/dL   Very High:  >= 190 mg/dL    Non HDL Cholesterol  Desirable:  130 mg/dL  Above  Desirable:  130-159 mg/dL  Borderline High:  160-189 mg/dL  High:  190-219 mg/dL  Very High:  Greater than or equal to 220 mg/dL       If you have any questions or concerns, please call the clinic at the number listed above.       Sincerely,      Rubén Wren MD

## 2023-01-11 NOTE — PROGRESS NOTES
"SUBJECTIVE:   Ed is a 88 year old who presents for Preventive Visit.      Are you in the first 12 months of your Medicare coverage?  No    Healthy Habits:     In general, how would you rate your overall health?  Good    Frequency of exercise:  None    Do you usually eat at least 4 servings of fruit and vegetables a day, include whole grains    & fiber and avoid regularly eating high fat or \"junk\" foods?  No    Taking medications regularly:  Yes    Medication side effects:  None    Ability to successfully perform activities of daily living:  No assistance needed    Home Safety:  No safety concerns identified    Hearing Impairment:  Need to ask people to speak up or repeat themselves    In the past 6 months, have you been bothered by leaking of urine? Yes    In general, how would you rate your overall mental or emotional health?  Excellent      PHQ-2 Total Score: 0    Additional concerns today:  No      Have you ever done Advance Care Planning? (For example, a Health Directive, POLST, or a discussion with a medical provider or your loved ones about your wishes): Yes, advance care planning is on file.       Fall risk  Fallen 2 or more times in the past year?: No  Any fall with injury in the past year?: No    Cognitive Screening   1) Repeat 3 items (Leader, Season, Table)    2) Clock draw: NORMAL  3) 3 item recall: Recalls 1 object   Results: NORMAL clock, 1-2 items recalled: COGNITIVE IMPAIRMENT LESS LIKELY    Mini-CogTM Copyright CRISTIANA Cadena. Licensed by the author for use in [facility]; reprinted with permission (xi@.[facility]). All rights reserved.      Do you have sleep apnea, excessive snoring or daytime drowsiness?: no    Reviewed and updated as needed this visit by clinical staff   Tobacco  Allergies  Meds              Reviewed and updated as needed this visit by Provider                 Social History     Tobacco Use     Smoking status: Former     Packs/day: 0.50     Years: 47.00     Pack years: " 23.50     Types: Cigarettes     Start date: 1948     Quit date: 1995     Years since quittin.0     Smokeless tobacco: Never   Substance Use Topics     Alcohol use: No     Comment: former         Alcohol Use 2023   Prescreen: >3 drinks/day or >7 drinks/week? Not Applicable               Current providers sharing in care for this patient include:   Patient Care Team:  Rubén Wren MD as PCP - General (Family Practice)  Radha Sales NP as Assigned Cancer Care Provider  Rubén Wren MD as Assigned PCP    The following health maintenance items are reviewed in Epic and correct as of today:  Health Maintenance   Topic Date Due     ZOSTER IMMUNIZATION (2 of 3) 2017     MEDICARE ANNUAL WELLNESS VISIT  2020     LIPID  2023     ALT  2023     TSH W/FREE T4 REFLEX  2023     FALL RISK ASSESSMENT  2024     DTAP/TDAP/TD IMMUNIZATION (4 - Td or Tdap) 2026     ADVANCE CARE PLANNING  2027     PHQ-2 (once per calendar year)  Completed     INFLUENZA VACCINE  Completed     Pneumococcal Vaccine: 65+ Years  Completed     COVID-19 Vaccine  Completed     IPV IMMUNIZATION  Aged Out     MENINGITIS IMMUNIZATION  Aged Out     Lab work is in process          Review of Systems  CONSTITUTIONAL: NEGATIVE for fever, chills, change in weight  INTEGUMENTARY/SKIN: NEGATIVE for worrisome rashes, moles or lesions  EYES: NEGATIVE for vision changes or irritation  ENT/MOUTH: NEGATIVE for ear, mouth and throat problems  RESP: NEGATIVE for significant cough or SOB  BREAST: NEGATIVE for masses, tenderness or discharge  CV: NEGATIVE for chest pain, palpitations or peripheral edema  GI: NEGATIVE for nausea, abdominal pain, heartburn, or change in bowel habits  : NEGATIVE for frequency, dysuria, or hematuria  MUSCULOSKELETAL: NEGATIVE for significant arthralgias or myalgia  NEURO: NEGATIVE for weakness, dizziness or paresthesias  ENDOCRINE: NEGATIVE for temperature  "intolerance, skin/hair changes  HEME: NEGATIVE for bleeding problems  PSYCHIATRIC: NEGATIVE for changes in mood or affect    OBJECTIVE:   /70   Pulse 66   Temp 97.4  F (36.3  C) (Tympanic)   Wt 79.8 kg (176 lb)   SpO2 96%   BMI 27.57 kg/m   Estimated body mass index is 27.57 kg/m  as calculated from the following:    Height as of 11/16/22: 1.702 m (5' 7\").    Weight as of this encounter: 79.8 kg (176 lb).  Physical Exam  GENERAL: healthy, alert and no distress  EYES: Eyes grossly normal to inspection, PERRL and conjunctivae and sclerae normal  HENT: ear canals and TM's normal, nose and mouth without ulcers or lesions  NECK: no adenopathy, no asymmetry, masses, or scars and thyroid normal to palpation  RESP: lungs clear to auscultation - no rales, rhonchi or wheezes  CV: regular rate and rhythm, normal S1 S2, no S3 or S4, no murmur, click or rub, no peripheral edema and peripheral pulses strong  ABDOMEN: soft, nontender, no hepatosplenomegaly, no masses and bowel sounds normal  MS: no gross musculoskeletal defects noted, no edema  SKIN: no suspicious lesions or rashes  NEURO: Normal strength and tone, mentation intact and speech normal  PSYCH: mentation appears normal, affect normal/bright    Diagnostic Test Results:  Labs reviewed in Epic    ASSESSMENT / PLAN:       ICD-10-CM    1. Hypothyroidism, unspecified type  E03.9 TSH with free T4 reflex     TSH with free T4 reflex      2. Hyperlipidemia LDL goal <130  E78.5 Comprehensive metabolic panel     Lipid Profile     Comprehensive metabolic panel     Lipid Profile      3. Encounter for Medicare annual wellness exam  Z00.00       4. Prostatism  N40.0 dutasteride (AVODART) 0.5 MG capsule                COUNSELING:  Reviewed preventive health counseling, as reflected in patient instructions      BMI:   Estimated body mass index is 27.57 kg/m  as calculated from the following:    Height as of 11/16/22: 1.702 m (5' 7\").    Weight as of this encounter: 79.8 kg " (176 lb).         He reports that he quit smoking about 28 years ago. His smoking use included cigarettes. He started smoking about 74 years ago. He has a 23.50 pack-year smoking history. He has never used smokeless tobacco.      Appropriate preventive services were discussed with this patient, including applicable screening as appropriate for cardiovascular disease, diabetes, osteopenia/osteoporosis, and glaucoma.  As appropriate for age/gender, discussed screening for colorectal cancer, prostate cancer, breast cancer, and cervical cancer. Checklist reviewing preventive services available has been given to the patient.    Reviewed patients plan of care and provided an AVS. The Basic Care Plan (routine screening as documented in Health Maintenance) for Lio meets the Care Plan requirement. This Care Plan has been established and reviewed with the Patient.          Rubén Wren MD  [facility]    Identified Health Risks:

## 2023-01-12 ENCOUNTER — TELEPHONE (OUTPATIENT)
Dept: FAMILY MEDICINE | Facility: OTHER | Age: 88
End: 2023-01-12

## 2023-01-12 NOTE — TELEPHONE ENCOUNTER
----- Message from Rubén Wren MD sent at 1/12/2023  7:32 AM CST -----  Labs stable.  TSH borderline but stable.  Plan to recheck TSH in 6 months otherwise follow annually.  Thank.s  Rubén Wren MD

## 2023-01-17 DIAGNOSIS — E03.9 HYPOTHYROIDISM, UNSPECIFIED TYPE: ICD-10-CM

## 2023-01-18 RX ORDER — LEVOTHYROXINE SODIUM 88 UG/1
TABLET ORAL
Qty: 90 TABLET | Refills: 1 | Status: SHIPPED | OUTPATIENT
Start: 2023-01-18 | End: 2023-07-06

## 2023-02-09 DIAGNOSIS — K21.9 GASTROESOPHAGEAL REFLUX DISEASE WITHOUT ESOPHAGITIS: ICD-10-CM

## 2023-02-09 DIAGNOSIS — N40.0 BENIGN PROSTATIC HYPERPLASIA, UNSPECIFIED WHETHER LOWER URINARY TRACT SYMPTOMS PRESENT: ICD-10-CM

## 2023-02-09 DIAGNOSIS — E78.5 HYPERLIPIDEMIA LDL GOAL <130: ICD-10-CM

## 2023-02-13 RX ORDER — TAMSULOSIN HYDROCHLORIDE 0.4 MG/1
CAPSULE ORAL
Qty: 180 CAPSULE | Refills: 2 | Status: SHIPPED | OUTPATIENT
Start: 2023-02-13 | End: 2023-11-06

## 2023-02-13 RX ORDER — PRAVASTATIN SODIUM 40 MG
TABLET ORAL
Qty: 135 TABLET | Refills: 2 | Status: SHIPPED | OUTPATIENT
Start: 2023-02-13 | End: 2023-11-06

## 2023-05-10 DIAGNOSIS — F41.9 ANXIETY: ICD-10-CM

## 2023-05-10 DIAGNOSIS — C50.911 PRIMARY BREAST MALIGNANCY, RIGHT (H): ICD-10-CM

## 2023-05-12 NOTE — TELEPHONE ENCOUNTER
Xanax      Last Written Prescription Date:  10/11/22  Last Fill Quantity: 60,   # refills: 5  Last Office Visit: 01/11/23  Future Office visit:       Tamoxifen      Last Written Prescription Date:  05/02/22  Last Fill Quantity: 90,   # refills: 3  Last Office Visit: 01/11/23  Future Office visit:

## 2023-05-15 RX ORDER — ALPRAZOLAM 0.5 MG
TABLET ORAL
Qty: 60 TABLET | Refills: 0 | Status: SHIPPED | OUTPATIENT
Start: 2023-05-15 | End: 2023-07-06

## 2023-05-15 RX ORDER — TAMOXIFEN CITRATE 20 MG/1
TABLET ORAL
Qty: 90 TABLET | Refills: 0 | Status: SHIPPED | OUTPATIENT
Start: 2023-05-15 | End: 2023-08-11

## 2023-06-10 DIAGNOSIS — I10 HTN, GOAL BELOW 140/80: ICD-10-CM

## 2023-06-12 RX ORDER — ATENOLOL 50 MG/1
TABLET ORAL
Qty: 90 TABLET | Refills: 0 | Status: SHIPPED | OUTPATIENT
Start: 2023-06-12 | End: 2023-09-15

## 2023-06-12 NOTE — TELEPHONE ENCOUNTER
atenolol (TENORMIN) 50 MG tablet 90 tablet 0 12/20/2022     Last Office Visit: 01/11/23  Future Office visit:       Routing refill request to provider for review/approval because:

## 2023-08-09 DIAGNOSIS — F41.9 ANXIETY: ICD-10-CM

## 2023-08-09 RX ORDER — ALPRAZOLAM 0.5 MG
TABLET ORAL
Qty: 60 TABLET | Refills: 0 | Status: SHIPPED | OUTPATIENT
Start: 2023-08-09 | End: 2023-10-10

## 2023-08-09 NOTE — TELEPHONE ENCOUNTER
xanax      Last Written Prescription Date:  7/6/23  Last Fill Quantity: 60,   # refills: 0  Last Office Visit: 1/11/23  Future Office visit:       Routing refill request to provider for review/approval because:

## 2023-10-09 DIAGNOSIS — E03.9 HYPOTHYROIDISM, UNSPECIFIED TYPE: ICD-10-CM

## 2023-10-09 DIAGNOSIS — F41.9 ANXIETY: ICD-10-CM

## 2023-10-10 RX ORDER — LEVOTHYROXINE SODIUM 88 UG/1
TABLET ORAL
Qty: 90 TABLET | Refills: 0 | Status: SHIPPED | OUTPATIENT
Start: 2023-10-10 | End: 2023-12-12

## 2023-10-10 RX ORDER — ALPRAZOLAM 0.5 MG
TABLET ORAL
Qty: 60 TABLET | Refills: 0 | Status: SHIPPED | OUTPATIENT
Start: 2023-10-10 | End: 2023-11-06

## 2023-10-10 NOTE — TELEPHONE ENCOUNTER
Synthroid      Last Written Prescription Date:  7/6/23  Last Fill Quantity: 90,   # refills: 0  Last Office Visit: 1/11/23  Future Office visit:    Next 5 appointments (look out 90 days)      Nov 15, 2023  1:00 PM  (Arrive by 12:45 PM)  Return Visit with Radha Sales NP  [facility] ([facility] ) [address] [place] [address]  [phone]             Routing refill request to provider for review/approval because:

## 2023-10-10 NOTE — TELEPHONE ENCOUNTER
Xanax      Last Written Prescription Date:  8/9/23  Last Fill Quantity: 60,   # refills: 0  Last Office Visit: 1/11/23  Future Office visit:    Next 5 appointments (look out 90 days)      Nov 15, 2023  1:00 PM  (Arrive by 12:45 PM)  Return Visit with Radha Sales NP  [facility] ([facility] ) [address] [place] [address]  [phone]             Routing refill request to provider for review/approval because:

## 2023-11-03 ENCOUNTER — ALLIED HEALTH/NURSE VISIT (OUTPATIENT)
Dept: FAMILY MEDICINE | Facility: OTHER | Age: 88
End: 2023-11-03
Attending: FAMILY MEDICINE
Payer: MEDICARE

## 2023-11-03 DIAGNOSIS — Z23 NEEDS FLU SHOT: Primary | ICD-10-CM

## 2023-11-03 PROCEDURE — 90480 ADMN SARSCOV2 VAC 1/ONLY CMP: CPT

## 2023-11-03 PROCEDURE — G0008 ADMIN INFLUENZA VIRUS VAC: HCPCS

## 2023-11-06 DIAGNOSIS — K21.9 GASTROESOPHAGEAL REFLUX DISEASE WITHOUT ESOPHAGITIS: ICD-10-CM

## 2023-11-06 DIAGNOSIS — E78.5 HYPERLIPIDEMIA LDL GOAL <130: ICD-10-CM

## 2023-11-06 DIAGNOSIS — F41.9 ANXIETY: ICD-10-CM

## 2023-11-06 DIAGNOSIS — C50.911 PRIMARY BREAST MALIGNANCY, RIGHT (H): ICD-10-CM

## 2023-11-06 DIAGNOSIS — N40.0 BENIGN PROSTATIC HYPERPLASIA, UNSPECIFIED WHETHER LOWER URINARY TRACT SYMPTOMS PRESENT: ICD-10-CM

## 2023-11-06 RX ORDER — PRAVASTATIN SODIUM 40 MG
TABLET ORAL
Qty: 135 TABLET | Refills: 0 | Status: SHIPPED | OUTPATIENT
Start: 2023-11-06 | End: 2024-02-07

## 2023-11-06 RX ORDER — TAMOXIFEN CITRATE 20 MG/1
TABLET ORAL
Qty: 90 TABLET | Refills: 0 | Status: SHIPPED | OUTPATIENT
Start: 2023-11-06 | End: 2024-02-07

## 2023-11-06 RX ORDER — TAMSULOSIN HYDROCHLORIDE 0.4 MG/1
CAPSULE ORAL
Qty: 180 CAPSULE | Refills: 0 | Status: SHIPPED | OUTPATIENT
Start: 2023-11-06 | End: 2024-02-07

## 2023-11-06 RX ORDER — ALPRAZOLAM 0.5 MG
TABLET ORAL
Qty: 60 TABLET | Refills: 0 | Status: SHIPPED | OUTPATIENT
Start: 2023-11-06 | End: 2023-12-12

## 2023-11-06 NOTE — TELEPHONE ENCOUNTER
Xanax      Last Written Prescription Date:  10/10/23  Last Fill Quantity: 60,   # refills: 0  Last Office Visit: 1/11/23  Future Office visit:    Next 5 appointments (look out 90 days)      Nov 15, 2023  1:00 PM  (Arrive by 12:45 PM)  Return Visit with Radha Sales NP  [facility] ([facility] ) [address] [place] [address]  [phone]             Routing refill request to provider for review/approval because:      Omeprazole      Last Written Prescription Date:  2/13/23  Last Fill Quantity: 90,   # refills: 2  Last Office Visit: 1/11/23  Future Office visit:    Next 5 appointments (look out 90 days)      Nov 15, 2023  1:00 PM  (Arrive by 12:45 PM)  Return Visit with Radha Sales NP  [facility] ) [address] [place] [address]  [phone]             Routing refill request to provider for review/approval because:      Flomax      Last Written Prescription Date:  2/13/23  Last Fill Quantity: 180,   # refills: 2  Last Office Visit: 1/11/23  Future Office visit:    Next 5 appointments (look out 90 days)      Nov 15, 2023  1:00 PM  (Arrive by 12:45 PM)  Return Visit with Radha Sales NP  [facility] ([facility] ) [address] [place] [address]  [phone]             Routing refill request to provider for review/approval because:    Pravastatin      Last Written Prescription Date:  2/13/23  Last Fill Quantity: 135,   # refills: 2  Last Office Visit: 1/11/23  Future Office visit:    Next 5 appointments (look out 90 days)      Nov 15, 2023  1:00 PM  (Arrive by 12:45 PM)  Return Visit with Radha Sales NP  [facility] ([facility] ) [address] [place] [address]  [phone]             Routing refill request to provider for review/approval because:

## 2023-11-06 NOTE — TELEPHONE ENCOUNTER
Kerrylvadex      Last Written Prescription Date:  8/11/23  Last Fill Quantity: 90,   # refills: 0  Last Office Visit: 1/11/23  Future Office visit:    Next 5 appointments (look out 90 days)      Nov 15, 2023  1:00 PM  (Arrive by 12:45 PM)  Return Visit with Radha Sales NP  [facility] ([facility] ) [address] [place] [address]  [phone]             Routing refill request to provider for review/approval because:

## 2023-12-01 ENCOUNTER — ONCOLOGY VISIT (OUTPATIENT)
Dept: ONCOLOGY | Facility: OTHER | Age: 88
End: 2023-12-01
Attending: NURSE PRACTITIONER
Payer: COMMERCIAL

## 2023-12-01 VITALS
HEIGHT: 67 IN | OXYGEN SATURATION: 97 % | WEIGHT: 176.37 LBS | HEART RATE: 81 BPM | TEMPERATURE: 99.1 F | DIASTOLIC BLOOD PRESSURE: 60 MMHG | BODY MASS INDEX: 27.68 KG/M2 | SYSTOLIC BLOOD PRESSURE: 112 MMHG | RESPIRATION RATE: 20 BRPM

## 2023-12-01 DIAGNOSIS — Z85.3 HISTORY OF RIGHT BREAST CANCER: Primary | ICD-10-CM

## 2023-12-01 DIAGNOSIS — Z15.09 BRCA2 POSITIVE: ICD-10-CM

## 2023-12-01 DIAGNOSIS — Z15.01 BRCA2 POSITIVE: ICD-10-CM

## 2023-12-01 PROBLEM — C50.921: Status: RESOLVED | Noted: 2017-02-18 | Resolved: 2023-12-01

## 2023-12-01 PROCEDURE — G0463 HOSPITAL OUTPT CLINIC VISIT: HCPCS

## 2023-12-01 PROCEDURE — 99213 OFFICE O/P EST LOW 20 MIN: CPT | Performed by: NURSE PRACTITIONER

## 2023-12-01 ASSESSMENT — PAIN SCALES - GENERAL: PAINLEVEL: MODERATE PAIN (4)

## 2023-12-01 NOTE — PROGRESS NOTES
Oncology Follow-up Visit:  December 1, 2023    Reason for Visit:  Patient presents with:  Oncology Clinic Visit: Follow up History of right breast cancer     Nursing Note and documentation reviewed: yes    HPI:  This is an 89-year-old male patient who  presents to the oncology clinic today in follow-up of a history of right breast cancer.  Patient underwent a right mastectomy with axillary node dissection in July 2015. He was placed on 20 mg of tamoxifen and remains on this.     He presents to the clinic today stating he is doing well and offers no new complaints.  He denies any changes to the chest wall including any new masses or skin changes.  Lymphedema to the right upper extremity is unchanged.  States he does get a little more discomfort in his hips when he walks an extended distance.  Note he is planning to see his PCP in January.    Oncologic History:     Patient had presented with complaints of right nipple irritation over a six-month period and was noted to have a mass in the right breast. Mammogram was completed followed by biopsy and pathology showed an invasive carcinoma. On 7/27/2015, he underwent a right modified radical mastectomy with axillary node dissection by Dr. Garsia. 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 DCIS was present, grade 2 with expansive comedonecrosis. Node dissection revealed 1/17 nodes revealing micrometastasis. Tumor was ER/MA positive, HER-2/thomas negative. Patient was staged pathologic T1c N1mi M0.      At consultation, patient was not interested in adjuvant chemotherapy and was placed on tamoxifen 20 mg daily.      BRCA 2+      Current Chemo Regime/TX: Tamoxifen 20mg daily initiated 8/2015  Current Cycle: n/a  # of completed cycles: n/a      Previous treatment:  n/a    Past Medical History:   Diagnosis Date    Arthritis     Cancer (H)     Dysphagia 06/28/2004     HTN (hypertension)     Need for prophylactic vaccination and inoculation 12/10/2001    Need for prophylactic vaccination and inoculation, 11/06/2003    Other and unspecified hyperlipidemia 05/01/2001    Prediabetes     Shortness of breath 03/28/2006    Shoulder pain 06/15/2012    Thyroid disease     Unspecified essential hypertension 05/18/2000       Past Surgical History:   Procedure Laterality Date    CARDIAC SURGERY      angiogram 1992 [facility]    COLONOSCOPY      1998    GENITOURINARY SURGERY      prostate bx [facility]    GENITOURINARY SURGERY      Prostate bx [facility]    GI SURGERY      EGD    GI SURGERY      Pt has EGD with removal of food from esophagus    MASTECTOMY SIMPLE, SENTINEL NODE, COMBINED Right 7/27/2015    Procedure: COMBINED MASTECTOMY SIMPLE, SENTINEL NODE;  Surgeon: Aliyah Garsia MD;  Location: [facility]    ORTHOPEDIC SURGERY      bilateral knee replacements       Family History   Problem Relation Age of Onset    Cancer - colorectal Mother     Eye Disorder Mother         cataracts    Musculoskeletal Disorder Mother         CTS    Colon Cancer Mother     Other Cancer Mother     Cancer Father         lung cancer    Genitourinary Problems Father         prostate problems    Other Cancer Father         lung    C.A.D. Brother         CABG    Eye Disorder Brother         cataracts    Breast Cancer Other     Ovarian Cancer Other     Pancreatic Cancer Other     Diabetes No family hx of     Coronary Artery Disease No family hx of     Cerebrovascular Disease No family hx of     Hyperlipidemia No family hx of     Hypertension No family hx of     Prostate Cancer No family hx of     Thyroid Disease No family hx of     Genetic Disorder No family hx of     Asthma No family hx of     Anesthesia Reaction No family hx of        Social History     Socioeconomic History    Marital status:      Spouse name: Marisa    Number of children: Not on file    Years of education: Not on file    Highest education  level: Not on file   Occupational History    Not on file   Tobacco Use    Smoking status: Former     Packs/day: 0.50     Years: 47.00     Additional pack years: 0.00     Total pack years: 23.50     Types: Cigarettes     Start date: 1948     Quit date: 1995     Years since quittin.8    Smokeless tobacco: Never   Substance and Sexual Activity    Alcohol use: No     Comment: former    Drug use: No    Sexual activity: Not on file   Other Topics Concern     Service Not Asked    Blood Transfusions Not Asked    Caffeine Concern Yes     Comment: coffee, 2 cups daily    Occupational Exposure Not Asked    Hobby Hazards Not Asked    Sleep Concern Not Asked    Stress Concern Not Asked    Weight Concern Not Asked    Special Diet Not Asked    Back Care Not Asked    Exercise Not Asked    Bike Helmet Not Asked    Seat Belt Not Asked    Self-Exams Not Asked    Parent/sibling w/ CABG, MI or angioplasty before 65F 55M? No   Social History Narrative    Not on file     Social Determinants of Health     Financial Resource Strain: Not on file   Food Insecurity: Not on file   Transportation Needs: Not on file   Physical Activity: Not on file   Stress: Not on file   Social Connections: Not on file   Interpersonal Safety: Not on file   Housing Stability: Not on file       Current Outpatient Medications   Medication    acetaminophen (TYLENOL) 325 MG tablet    ALPRAZolam (XANAX) 0.5 MG tablet    Ascorbic Acid (VITAMIN C PO)    aspirin (ASA) 325 MG EC tablet    atenolol (TENORMIN) 50 MG tablet    dutasteride (AVODART) 0.5 MG capsule    levothyroxine (SYNTHROID/LEVOTHROID) 88 MCG tablet    omeprazole (PRILOSEC) 20 MG DR capsule    pravastatin (PRAVACHOL) 40 MG tablet    sennosides (SENOKOT) 8.6 MG tablet    tamoxifen (NOLVADEX) 20 MG tablet    tamsulosin (FLOMAX) 0.4 MG capsule    triamcinolone (ARISTOCORT HP) 0.5 % external cream     No current facility-administered medications for this visit.        No Known  "Allergies    Review Of Systems:  Constitutional:    denies fever, weight changes, chills, and night sweats.  Eyes:    denies blurred or double vision  Ears/Nose/Throat:   denies ear pain, nose problems, difficulty swallowing  Respiratory:   denies shortness of breath, cough   Skin:   denies rash, lesions  Cardiovascular:   denies chest pain, palpitations, edema  Gastrointestinal:   denies abdominal pain, bloating, nausea, vomiting, early satiety; no change in bowel habits or blood in stool  Genitourinary:   denies difficulty with urination, blood in urine  Musculoskeletal:   see HPI  Neurologic:   denies lightheadedness, headaches, numbness or tingling  Psychiatric:   denies anxiety, depression  Hematologic/Lymphatic/Immunologic:   denies easy bruising, easy bleeding, lumps or bumps noted  Endocrine:   Denies increased thirst      Physical Exam:  /60   Pulse 81   Temp 99.1  F (37.3  C) (Tympanic)   Resp 20   Ht 1.702 m (5' 7\")   Wt 80 kg (176 lb 5.9 oz)   SpO2 97%   BMI 27.62 kg/m      GENERAL APPEARANCE: Healthy, alert and in no acute distress.  HEENT: Normocephalic, Sclerae anicteric.  No obvious oral lesions or thrush  NECK:   No asymmetry or masses, no thyromegaly.  LYMPHATICS: No palpable cervical, supraclavicular, axillary, or inguinal nodes   RESP: Lungs with faint wheeze to lower lobes bilaterally, respirations regular and easy  CARDIOVASCULAR: Regular rate and rhythm. Normal S1, S2  ABDOMEN: Soft, nontender. Bowel sounds auscultated all 4 quadrants. No palpable organomegaly or masses.  BREAST/Chest wall: No concerning masses or skin changes  SKIN: No suspicious lesions or rashes to exposed skin  NEURO: Alert and oriented x 3.  Gait steady.  PSYCHIATRIC: Mentation and affect appear normal.  Mood appropriate.    Laboratory: None completed for today's visit    Imaging Studies: None completed for today's visit      ASSESSMENT/PLAN:    #1 history of breast cancer: History of  Stage 1, pT1c N1mi M0 " carcinoma of the right breast diagnosed in July 2015.  He underwent mastectomy with node dissection with 1/17 nodes revealing micrometastases; tumor was 2cm, ER/MT positive, HER-2/thomas negative.  BRCA2 positive.  He'll continue with tamoxifen 20 mg daily and follow up in 1 year.     #2 BRCA2 positive: Discussed obtaining a PSA and patient declines.  We did discuss risk versus benefit of further evaluation if there was to be elevation of the PSA.    I encouraged patient to call with any questions or concerns.      Radha Sales, NP  APRN, FNP-BC, AOCNP

## 2023-12-01 NOTE — NURSING NOTE
"Oncology Rooming Note    December 1, 2023 3:39 PM   Lio Daly is a 89 year old male who presents for:    Chief Complaint   Patient presents with    Oncology Clinic Visit     Follow up History of right breast cancer     Initial Vitals: /60   Pulse 81   Temp 99.1  F (37.3  C) (Tympanic)   Resp 20   Ht 1.702 m (5' 7\")   Wt 80 kg (176 lb 5.9 oz)   SpO2 97%   BMI 27.62 kg/m   Estimated body mass index is 27.62 kg/m  as calculated from the following:    Height as of this encounter: 1.702 m (5' 7\").    Weight as of this encounter: 80 kg (176 lb 5.9 oz). Body surface area is 1.94 meters squared.  Moderate Pain (4) Comment: Data Unavailable   No LMP for male patient.  Allergies reviewed: Yes  Medications reviewed: Yes    Medications: Medication refills not needed today.  Pharmacy name entered into [employer]: [facility] - [place], [place] - [address]             "

## 2023-12-01 NOTE — PATIENT INSTRUCTIONS
We would like to see you back in 1 year. No labs.    When you are in need of a refill of your tamoxifen, please call your pharmacy and they will send us a request.     If you have any questions please call [phone]    Other instructions: none

## 2023-12-11 DIAGNOSIS — R21 RASH: ICD-10-CM

## 2023-12-11 DIAGNOSIS — F41.9 ANXIETY: ICD-10-CM

## 2023-12-11 DIAGNOSIS — N40.0 PROSTATISM: ICD-10-CM

## 2023-12-11 DIAGNOSIS — E03.9 HYPOTHYROIDISM, UNSPECIFIED TYPE: ICD-10-CM

## 2023-12-11 DIAGNOSIS — I10 HTN, GOAL BELOW 140/80: ICD-10-CM

## 2023-12-11 NOTE — TELEPHONE ENCOUNTER
aristocort      Last Written Prescription Date:  8-11-23  Last Fill Quantity: 30g,   # refills: 0  Last Office Visit: 12-1-23  Future Office visit:                  tenormin      Last Written Prescription Date:  9-15-23  Last Fill Quantity: 90,   # refills: 0  Last Office Visit: 12-1-23  Future Office visit:    Next 5 appointments (look out 90 days)      Jan 23, 2024  3:15 PM  (Arrive by 3:00 PM)  PHYSICAL with Rubén Wren MD  [facility] [address] [facility] [address]  [phone]             Routing refill request to provider for review/approval because:  Drug not on the [facility], [facility] or [facility] refill protocol or controlled substance

## 2023-12-11 NOTE — TELEPHONE ENCOUNTER
Xanax      Last Written Prescription Date:  11/06/23  Last Fill Quantity: 60,   # refills: 0  Last Office Visit: 01/11/23  Future Office visit:    Next 5 appointments (look out 90 days)      Jan 23, 2024  3:15 PM  (Arrive by 3:00 PM)  PHYSICAL with Rubén Wren MD  [facility] [address]  [facility] [address]  [phone]           Avodart      Last Written Prescription Date:  01/11/23  Last Fill Quantity: 90,   # refills: 3  Last Office Visit: 01/11/23  Future Office visit:    Next 5 appointments (look out 90 days)      Jan 23, 2024  3:15 PM  (Arrive by 3:00 PM)  PHYSICAL with Rubén Wren MD  [facility] [address]  [facility] [address]  [phone]           Synthroid      Last Written Prescription Date:  10/10/23  Last Fill Quantity: 90,   # refills: 0  Last Office Visit: 01/11/23  Future Office visit:    Next 5 appointments (look out 90 days)      Jan 23, 2024  3:15 PM  (Arrive by 3:00 PM)  PHYSICAL with Rubén Wren MD  [facility] [address]  [facility] [address]  [phone]

## 2023-12-12 RX ORDER — ALPRAZOLAM 0.5 MG
TABLET ORAL
Qty: 60 TABLET | Refills: 0 | Status: SHIPPED | OUTPATIENT
Start: 2023-12-12 | End: 2024-02-07

## 2023-12-12 RX ORDER — LEVOTHYROXINE SODIUM 88 UG/1
TABLET ORAL
Qty: 90 TABLET | Refills: 0 | Status: SHIPPED | OUTPATIENT
Start: 2023-12-12 | End: 2024-04-10

## 2023-12-12 RX ORDER — DUTASTERIDE 0.5 MG/1
1 CAPSULE, LIQUID FILLED ORAL DAILY
Qty: 90 CAPSULE | Refills: 3 | Status: SHIPPED | OUTPATIENT
Start: 2023-12-12

## 2023-12-13 RX ORDER — TRIAMCINOLONE ACETONIDE 5 MG/G
CREAM TOPICAL
Qty: 30 G | Refills: 0 | Status: SHIPPED | OUTPATIENT
Start: 2023-12-13 | End: 2024-05-09

## 2023-12-13 RX ORDER — ATENOLOL 50 MG/1
TABLET ORAL
Qty: 90 TABLET | Refills: 3 | Status: SHIPPED | OUTPATIENT
Start: 2023-12-13

## 2024-01-23 ENCOUNTER — OFFICE VISIT (OUTPATIENT)
Dept: FAMILY MEDICINE | Facility: OTHER | Age: 89
End: 2024-01-23
Attending: FAMILY MEDICINE
Payer: COMMERCIAL

## 2024-01-23 VITALS
DIASTOLIC BLOOD PRESSURE: 76 MMHG | OXYGEN SATURATION: 96 % | SYSTOLIC BLOOD PRESSURE: 134 MMHG | HEART RATE: 66 BPM | TEMPERATURE: 98.3 F | BODY MASS INDEX: 28.04 KG/M2 | WEIGHT: 179 LBS

## 2024-01-23 DIAGNOSIS — E03.9 HYPOTHYROIDISM, UNSPECIFIED TYPE: Primary | ICD-10-CM

## 2024-01-23 DIAGNOSIS — Z00.00 MEDICARE ANNUAL WELLNESS VISIT, SUBSEQUENT: ICD-10-CM

## 2024-01-23 DIAGNOSIS — Z85.3 HISTORY OF RIGHT BREAST CANCER: ICD-10-CM

## 2024-01-23 DIAGNOSIS — E78.5 HYPERLIPIDEMIA LDL GOAL <130: ICD-10-CM

## 2024-01-23 DIAGNOSIS — K21.9 GASTROESOPHAGEAL REFLUX DISEASE WITHOUT ESOPHAGITIS: ICD-10-CM

## 2024-01-23 DIAGNOSIS — F41.9 ANXIETY: ICD-10-CM

## 2024-01-23 PROCEDURE — G0439 PPPS, SUBSEQ VISIT: HCPCS | Performed by: FAMILY MEDICINE

## 2024-01-23 PROCEDURE — 82247 BILIRUBIN TOTAL: CPT | Mod: ZL | Performed by: FAMILY MEDICINE

## 2024-01-23 PROCEDURE — 84443 ASSAY THYROID STIM HORMONE: CPT | Mod: ZL | Performed by: FAMILY MEDICINE

## 2024-01-23 PROCEDURE — 80061 LIPID PANEL: CPT | Mod: ZL | Performed by: FAMILY MEDICINE

## 2024-01-23 PROCEDURE — 36415 COLL VENOUS BLD VENIPUNCTURE: CPT | Mod: ZL | Performed by: FAMILY MEDICINE

## 2024-01-23 ASSESSMENT — ENCOUNTER SYMPTOMS
COUGH: 0
FEVER: 0
JOINT SWELLING: 0
PARESTHESIAS: 0
EYE PAIN: 0
DYSURIA: 0
HEARTBURN: 0
HEADACHES: 0
HEMATOCHEZIA: 0
CONSTIPATION: 0
DIZZINESS: 0
FREQUENCY: 0
NAUSEA: 0
ARTHRALGIAS: 0
SHORTNESS OF BREATH: 1
NERVOUS/ANXIOUS: 0
MYALGIAS: 0
HEMATURIA: 0
ABDOMINAL PAIN: 0
SORE THROAT: 0
DIARRHEA: 0
WEAKNESS: 1
PALPITATIONS: 0
CHILLS: 0

## 2024-01-23 ASSESSMENT — PAIN SCALES - GENERAL: PAINLEVEL: NO PAIN (0)

## 2024-01-23 ASSESSMENT — ACTIVITIES OF DAILY LIVING (ADL): CURRENT_FUNCTION: NO ASSISTANCE NEEDED

## 2024-01-23 NOTE — LETTER
January 24, 2024      Ed JERI Daly  [address] [address] [facility] [address]  [facility] [address]        Dear ,    We are writing to inform you of your test results.    Nice and stable, follow up annually.     Resulted Orders   TSH with free T4 reflex   Result Value Ref Range    TSH 3.69 0.30 - 4.20 uIU/mL   Comprehensive metabolic panel   Result Value Ref Range    Sodium 135 135 - 145 mmol/L      Comment:      Reference intervals for this test were updated on 09/26/2023 to more accurately reflect our healthy population. There may be differences in the flagging of prior results with similar values performed with this method. Interpretation of those prior results can be made in the context of the updated reference intervals.     Potassium 4.6 3.4 - 5.3 mmol/L    Carbon Dioxide (CO2) 21 (L) 22 - 29 mmol/L    Anion Gap 12 7 - 15 mmol/L    Urea Nitrogen 25.3 (H) 8.0 - 23.0 mg/dL    Creatinine 1.18 (H) 0.67 - 1.17 mg/dL    GFR Estimate 59 (L) >60 mL/min/1.73m2    Calcium 8.8 8.8 - 10.2 mg/dL    Chloride 102 98 - 107 mmol/L    Glucose 120 (H) 70 - 99 mg/dL    Alkaline Phosphatase 76 40 - 150 U/L      Comment:      Reference intervals for this test were updated on 11/14/2023 to more accurately reflect our healthy population. There may be differences in the flagging of prior results with similar values performed with this method. Interpretation of those prior results can be made in the context of the updated reference intervals.    AST 24 0 - 45 U/L      Comment:      Reference intervals for this test were updated on 6/12/2023 to more accurately reflect our healthy population. There may be differences in the flagging of prior results with similar values performed with this method. Interpretation of those prior results can be made in the context of the updated reference intervals.    ALT 13 0 - 70 U/L      Comment:      Reference intervals for this test were updated on 6/12/2023 to more accurately reflect our healthy population. There  may be differences in the flagging of prior results with similar values performed with this method. Interpretation of those prior results can be made in the context of the updated reference intervals.      Protein Total 7.1 6.4 - 8.3 g/dL    Albumin 4.2 3.5 - 5.2 g/dL    Bilirubin Total 0.2 <=1.2 mg/dL   Lipid Profile   Result Value Ref Range    Cholesterol 133 <200 mg/dL    Triglycerides 109 <150 mg/dL    Direct Measure HDL 53 >=40 mg/dL    LDL Cholesterol Calculated 58 <=100 mg/dL    Non HDL Cholesterol 80 <130 mg/dL    Patient Fasting > 8hrs? No     Narrative    Cholesterol  Desirable:  <200 mg/dL    Triglycerides  Normal:  Less than 150 mg/dL  Borderline High:  150-199 mg/dL  High:  200-499 mg/dL  Very High:  Greater than or equal to 500 mg/dL    Direct Measure HDL  Female:  Greater than or equal to 50 mg/dL   Male:  Greater than or equal to 40 mg/dL    LDL Cholesterol  Desirable:  <100mg/dL  Above Desirable:  100-129 mg/dL   Borderline High:  130-159 mg/dL   High:  160-189 mg/dL   Very High:  >= 190 mg/dL    Non HDL Cholesterol  Desirable:  130 mg/dL  Above Desirable:  130-159 mg/dL  Borderline High:  160-189 mg/dL  High:  190-219 mg/dL  Very High:  Greater than or equal to 220 mg/dL       If you have any questions or concerns, please call the clinic at the number listed above.       Sincerely,      Rubén Wren MD

## 2024-01-23 NOTE — PROGRESS NOTES
"Preventive Care Visit  [facility] [place]  Rubén Wren MD, Family Medicine  Jan 23, 2024      SUBJECTIVE:   Ed is a 89 year old, presenting for the following:  Wellness Visit      Are you in the first 12 months of your Medicare coverage?  No    Healthy Habits:     In general, how would you rate your overall health?  Good    Frequency of exercise:  6-7 days/week    Duration of exercise:  Less than 15 minutes    Do you usually eat at least 4 servings of fruit and vegetables a day, include whole grains    & fiber and avoid regularly eating high fat or \"junk\" foods?  No    Taking medications regularly:  Yes    Medication side effects:  None    Ability to successfully perform activities of daily living:  No assistance needed    Home Safety:  No safety concerns identified    Hearing Impairment:  Need to ask people to speak up or repeat themselves    In the past 6 months, have you been bothered by leaking of urine? Yes    In general, how would you rate your overall mental or emotional health?  Good    Additional concerns today:  No      Today's PHQ-2 Score:       1/23/2024     2:51 PM   PHQ-2 ( 1999 [employer])   Q1: Little interest or pleasure in doing things 0   Q2: Feeling down, depressed or hopeless 0   PHQ-2 Score 0   Q1: Little interest or pleasure in doing things Not at all   Q2: Feeling down, depressed or hopeless Not at all   PHQ-2 Score 0           Have you ever done Advance Care Planning? (For example, a Health Directive, POLST, or a discussion with a medical provider or your loved ones about your wishes): Yes, advance care planning is on file.       Fall risk  Fallen 2 or more times in the past year?: No  Any fall with injury in the past year?: No    Cognitive Screening   1) Repeat 3 items (Leader, Season, Table)    2) Clock draw: NORMAL  3) 3 item recall: Recalls 2 objects   Results: NORMAL clock, 1-2 items recalled: COGNITIVE IMPAIRMENT LESS LIKELY    Mini-CogTM Copyright S Surinder. Licensed by the author for " use in [facility]; reprinted with permission (nelsonlidya@.[facility]). All rights reserved.      Do you have sleep apnea, excessive snoring or daytime drowsiness? : no    Reviewed and updated as needed this visit by clinical staff   Tobacco  Allergies  Meds              Reviewed and updated as needed this visit by Provider                  Social History     Tobacco Use    Smoking status: Former     Packs/day: 0.50     Years: 47.00     Additional pack years: 0.00     Total pack years: 23.50     Types: Cigarettes     Start date: 1948     Quit date: 1995     Years since quittin.0    Smokeless tobacco: Never   Substance Use Topics    Alcohol use: No     Comment: former             2024     2:50 PM   Alcohol Use   Prescreen: >3 drinks/day or >7 drinks/week? Not Applicable     Do you have a current opioid prescription? No  Do you use any other controlled substances or medications that are not prescribed by a provider? None              Current providers sharing in care for this patient include:   Patient Care Team:  Rubén Wren MD as PCP - General (Family Practice)  Radha Sales NP as Assigned Cancer Care Provider  Rubén Wren MD as Assigned PCP    The following health maintenance items are reviewed in Epic and correct as of today:  Health Maintenance   Topic Date Due    RSV VACCINE (Pregnancy & 60+) (1 - 1-dose 60+ series) Never done    ZOSTER IMMUNIZATION (1 of 2) 2017    ALT  2024    LIPID  2024    MEDICARE ANNUAL WELLNESS VISIT  2024    TSH W/FREE T4 REFLEX  2024    FALL RISK ASSESSMENT  2025    DTAP/TDAP/TD IMMUNIZATION (4 - Td or Tdap) 2026    ADVANCE CARE PLANNING  2028    PHQ-2 (once per calendar year)  Completed    INFLUENZA VACCINE  Completed    Pneumococcal Vaccine: 65+ Years  Completed    COVID-19 Vaccine  Completed    IPV IMMUNIZATION  Aged Out    HPV IMMUNIZATION  Aged Out    MENINGITIS IMMUNIZATION  Aged Out  "   RSV MONOCLONAL ANTIBODY  Aged Out     Lab work is in process        Review of Systems   Constitutional:  Negative for chills and fever.   HENT:  Positive for hearing loss. Negative for congestion, ear pain and sore throat.    Eyes:  Negative for pain and visual disturbance.   Respiratory:  Positive for shortness of breath. Negative for cough.    Cardiovascular:  Negative for chest pain and palpitations.   Gastrointestinal:  Negative for abdominal pain, constipation, diarrhea and nausea.   Genitourinary:  Negative for dysuria, frequency, genital sores, hematuria, penile discharge and urgency.   Musculoskeletal:  Negative for arthralgias, joint swelling and myalgias.   Skin:  Negative for rash.   Neurological:  Positive for weakness. Negative for dizziness and headaches.   Psychiatric/Behavioral:  The patient is not nervous/anxious.         Review of Systems  CONSTITUTIONAL: NEGATIVE for fever, chills, change in weight  INTEGUMENTARY/SKIN: NEGATIVE for worrisome rashes, moles or lesions  EYES: NEGATIVE for vision changes or irritation  ENT/MOUTH: NEGATIVE for ear, mouth and throat problems  RESP: NEGATIVE for significant cough or SOB  BREAST: NEGATIVE for masses, tenderness or discharge  CV: NEGATIVE for chest pain, palpitations or peripheral edema  GI: NEGATIVE for nausea, abdominal pain, heartburn, or change in bowel habits  : NEGATIVE for frequency, dysuria, or hematuria  MUSCULOSKELETAL: NEGATIVE for significant arthralgias or myalgia  NEURO: NEGATIVE for weakness, dizziness or paresthesias  ENDOCRINE: NEGATIVE for temperature intolerance, skin/hair changes  HEME: NEGATIVE for bleeding problems  PSYCHIATRIC: NEGATIVE for changes in mood or affect    OBJECTIVE:   /76   Pulse 66   Temp 98.3  F (36.8  C) (Tympanic)   Wt 81.2 kg (179 lb)   SpO2 96%   BMI 28.04 kg/m     Estimated body mass index is 28.04 kg/m  as calculated from the following:    Height as of 12/1/23: 1.702 m (5' 7\").    Weight as of " "this encounter: 81.2 kg (179 lb).  Physical Exam  GENERAL: alert and no distress  EYES: Eyes grossly normal to inspection, PERRL and conjunctivae and sclerae normal  HENT: ear canals and TM's normal, nose and mouth without ulcers or lesions  NECK: no adenopathy, no asymmetry, masses, or scars  RESP: lungs clear to auscultation - no rales, rhonchi or wheezes  CV: regular rate and rhythm, normal S1 S2, no S3 or S4, no murmur, click or rub, no peripheral edema  ABDOMEN: soft, nontender, no hepatosplenomegaly, no masses and bowel sounds normal  MS: no gross musculoskeletal defects noted, no edema  SKIN: no suspicious lesions or rashes  NEURO: Normal strength and tone, mentation intact and speech normal  PSYCH: mentation appears normal, affect normal/bright  LYMPH: no cervical, supraclavicular, axillary, or inguinal adenopathy    Diagnostic Test Results:  Labs reviewed in Epic    ASSESSMENT / PLAN:   Hypothyroidism, unspecified type  Update.   - TSH with free T4 reflex    Hyperlipidemia LDL goal <130  update  - Comprehensive metabolic panel  - Lipid Profile    Anxiety  Doing well.  Uses the xanax at night only for years without side effects.  We are going to continue this throughout his life.     History of right breast cancer  Doing well.      Gastroesophageal reflux disease without esophagitis  Stable.     Medicare annual wellness visit, subsequent  As above.  Nice review and visit with Ed who is doing great.            Counseling  Reviewed preventive health counseling, as reflected in patient instructions      BMI  Estimated body mass index is 28.04 kg/m  as calculated from the following:    Height as of 12/1/23: 1.702 m (5' 7\").    Weight as of this encounter: 81.2 kg (179 lb).         He reports that he quit smoking about 29 years ago. His smoking use included cigarettes. He started smoking about 75 years ago. He has a 23.5 pack-year smoking history. He has never used smokeless tobacco.      Appropriate preventive " services were discussed with this patient, including applicable screening as appropriate for fall prevention, nutrition, physical activity, Tobacco-use cessation, weight loss and cognition.  Checklist reviewing preventive services available has been given to the patient.    Reviewed patients plan of care and provided an AVS. The Basic Care Plan (routine screening as documented in Health Maintenance) for Edward meets the Care Plan requirement. This Care Plan has been established and reviewed with the Patient.          Signed Electronically by: Rubén Wren MD    Identified Health Risks  I have reviewed Opioid Use Disorder and Substance Use Disorder risk factors and made any needed referrals.

## 2024-01-24 LAB
ALBUMIN SERPL BCG-MCNC: 4.2 G/DL (ref 3.5–5.2)
ALP SERPL-CCNC: 76 U/L (ref 40–150)
ALT SERPL W P-5'-P-CCNC: 13 U/L (ref 0–70)
ANION GAP SERPL CALCULATED.3IONS-SCNC: 12 MMOL/L (ref 7–15)
AST SERPL W P-5'-P-CCNC: 24 U/L (ref 0–45)
BILIRUB SERPL-MCNC: 0.2 MG/DL
BUN SERPL-MCNC: 25.3 MG/DL (ref 8–23)
CALCIUM SERPL-MCNC: 8.8 MG/DL (ref 8.8–10.2)
CHLORIDE SERPL-SCNC: 102 MMOL/L (ref 98–107)
CHOLEST SERPL-MCNC: 133 MG/DL
CREAT SERPL-MCNC: 1.18 MG/DL (ref 0.67–1.17)
DEPRECATED HCO3 PLAS-SCNC: 21 MMOL/L (ref 22–29)
EGFRCR SERPLBLD CKD-EPI 2021: 59 ML/MIN/1.73M2
FASTING STATUS PATIENT QL REPORTED: NO
GLUCOSE SERPL-MCNC: 120 MG/DL (ref 70–99)
HDLC SERPL-MCNC: 53 MG/DL
LDLC SERPL CALC-MCNC: 58 MG/DL
NONHDLC SERPL-MCNC: 80 MG/DL
POTASSIUM SERPL-SCNC: 4.6 MMOL/L (ref 3.4–5.3)
PROT SERPL-MCNC: 7.1 G/DL (ref 6.4–8.3)
SODIUM SERPL-SCNC: 135 MMOL/L (ref 135–145)
TRIGL SERPL-MCNC: 109 MG/DL
TSH SERPL DL<=0.005 MIU/L-ACNC: 3.69 UIU/ML (ref 0.3–4.2)

## 2024-02-07 DIAGNOSIS — K21.9 GASTROESOPHAGEAL REFLUX DISEASE WITHOUT ESOPHAGITIS: ICD-10-CM

## 2024-02-07 DIAGNOSIS — N40.0 BENIGN PROSTATIC HYPERPLASIA, UNSPECIFIED WHETHER LOWER URINARY TRACT SYMPTOMS PRESENT: ICD-10-CM

## 2024-02-07 DIAGNOSIS — C50.911 PRIMARY BREAST MALIGNANCY, RIGHT (H): ICD-10-CM

## 2024-02-07 DIAGNOSIS — E78.5 HYPERLIPIDEMIA LDL GOAL <130: ICD-10-CM

## 2024-02-07 DIAGNOSIS — F41.9 ANXIETY: ICD-10-CM

## 2024-02-07 RX ORDER — PRAVASTATIN SODIUM 40 MG
TABLET ORAL
Qty: 135 TABLET | Refills: 3 | Status: SHIPPED | OUTPATIENT
Start: 2024-02-07

## 2024-02-07 RX ORDER — ALPRAZOLAM 0.5 MG
TABLET ORAL
Qty: 60 TABLET | Refills: 5 | Status: SHIPPED | OUTPATIENT
Start: 2024-02-07 | End: 2024-08-26

## 2024-02-07 RX ORDER — TAMOXIFEN CITRATE 20 MG/1
TABLET ORAL
Qty: 90 TABLET | Refills: 0 | Status: SHIPPED | OUTPATIENT
Start: 2024-02-07 | End: 2024-05-09

## 2024-02-07 RX ORDER — TAMSULOSIN HYDROCHLORIDE 0.4 MG/1
CAPSULE ORAL
Qty: 180 CAPSULE | Refills: 3 | Status: SHIPPED | OUTPATIENT
Start: 2024-02-07

## 2024-02-07 NOTE — TELEPHONE ENCOUNTER
Xanax       Last Written Prescription Date:  12/12/2023  Last Fill Quantity: 60,   # refills: 0  Last Office Visit: 1/23/2024  Future Office visit:       Prilosec       Last Written Prescription Date:  11/06/2023  Last Fill Quantity: 90,   # refills: 0  Last Office Visit: 1/23/2024  Future Office visit:       Pravastatin       Last Written Prescription Date:  11/06/2023  Last Fill Quantity: 135,   # refills: 0  Last Office Visit: 1/23/2024  Future Office visit:       Nolvadex       Last Written Prescription Date:  11/06/2023  Last Fill Quantity: 90,   # refills: 0  Last Office Visit: 1/23/2024  Flomax       Last Written Prescription Date:  11/06/2023  Last Fill Quantity: 180,   # refills: 0  Last Office Visit: 1/23/2024  Future Office visit:

## 2024-04-10 DIAGNOSIS — E03.9 HYPOTHYROIDISM, UNSPECIFIED TYPE: ICD-10-CM

## 2024-04-10 RX ORDER — LEVOTHYROXINE SODIUM 88 UG/1
TABLET ORAL
Qty: 90 TABLET | Refills: 2 | Status: SHIPPED | OUTPATIENT
Start: 2024-04-10

## 2024-08-09 DIAGNOSIS — C50.911 PRIMARY BREAST MALIGNANCY, RIGHT (H): ICD-10-CM

## 2024-08-09 NOTE — TELEPHONE ENCOUNTER
Tomoxifen (Nolvadex) 20 MG tablet     Last Written Prescription Date:  05/09/2024  Last Fill Quantity: 90,   # refills: 0  Last Office Visit: 01/23/2024

## 2024-08-12 RX ORDER — TAMOXIFEN CITRATE 20 MG/1
TABLET ORAL
Qty: 90 TABLET | Refills: 0 | Status: SHIPPED | OUTPATIENT
Start: 2024-08-12

## 2024-08-26 DIAGNOSIS — F41.9 ANXIETY: ICD-10-CM

## 2024-08-26 RX ORDER — ALPRAZOLAM 0.5 MG
TABLET ORAL
Qty: 60 TABLET | Refills: 0 | Status: SHIPPED | OUTPATIENT
Start: 2024-08-26 | End: 2024-10-07

## 2024-10-07 DIAGNOSIS — F41.9 ANXIETY: ICD-10-CM

## 2024-10-07 RX ORDER — ALPRAZOLAM 0.5 MG
TABLET ORAL
Qty: 60 TABLET | Refills: 0 | Status: SHIPPED | OUTPATIENT
Start: 2024-10-07 | End: 2024-11-11

## 2024-10-07 NOTE — TELEPHONE ENCOUNTER
ALPRAZOLAM 0.5 MG TABLET       Last Written Prescription Date:  8/26/24  Last Fill Quantity: 60,   # refills: 0  Last Office Visit: 1/23/24  Future Office visit:    Next 5 appointments (look out 90 days)      Dec 04, 2024 1:50 PM  (Arrive by 1:35 PM)  Return Visit with Radha Sales NP  [facility] ([facility] ) [address] [place] [address] AVE  [facility] [address]  [phone]             Routing refill request to provider for review/approval because:

## 2024-11-11 DIAGNOSIS — F41.9 ANXIETY: ICD-10-CM

## 2024-11-11 DIAGNOSIS — C50.911 PRIMARY BREAST MALIGNANCY, RIGHT (H): ICD-10-CM

## 2024-11-11 RX ORDER — ALPRAZOLAM 0.5 MG
TABLET ORAL
Qty: 60 TABLET | Refills: 0 | Status: SHIPPED | OUTPATIENT
Start: 2024-11-11

## 2024-11-11 RX ORDER — TAMOXIFEN CITRATE 20 MG/1
TABLET ORAL
Qty: 90 TABLET | Refills: 0 | Status: SHIPPED | OUTPATIENT
Start: 2024-11-11

## 2024-11-11 NOTE — TELEPHONE ENCOUNTER
Tamoxifen 20 mg   Last signed 8/12 #90, 0 R  Last office visit 1/23  Has upcoming Oncology visit on 12/4  Jocelynn Jordan, RN  Care Coordination

## 2024-11-14 ENCOUNTER — HOSPITAL ENCOUNTER (EMERGENCY)
Facility: HOSPITAL | Age: 89
Discharge: HOME | End: 2024-11-14
Attending: PHYSICIAN ASSISTANT
Payer: MEDICARE

## 2024-11-14 VITALS
HEIGHT: 66 IN | HEART RATE: 65 BPM | RESPIRATION RATE: 22 BRPM | SYSTOLIC BLOOD PRESSURE: 110 MMHG | WEIGHT: 160 LBS | BODY MASS INDEX: 25.71 KG/M2 | DIASTOLIC BLOOD PRESSURE: 73 MMHG | OXYGEN SATURATION: 97 % | TEMPERATURE: 97.6 F

## 2024-11-14 DIAGNOSIS — M79.605 BILATERAL LEG PAIN: ICD-10-CM

## 2024-11-14 DIAGNOSIS — M79.604 BILATERAL LEG PAIN: ICD-10-CM

## 2024-11-14 LAB
ANION GAP SERPL CALCULATED.3IONS-SCNC: 12 MMOL/L (ref 7–15)
BASOPHILS # BLD AUTO: 0.1 10E3/UL (ref 0–0.2)
BASOPHILS NFR BLD AUTO: 1 %
BUN SERPL-MCNC: 25.3 MG/DL (ref 8–23)
CALCIUM SERPL-MCNC: 8.9 MG/DL (ref 8.8–10.4)
CHLORIDE SERPL-SCNC: 107 MMOL/L (ref 98–107)
CREAT SERPL-MCNC: 1.47 MG/DL (ref 0.67–1.17)
CRP SERPL-MCNC: 13.37 MG/L
EGFRCR SERPLBLD CKD-EPI 2021: 45 ML/MIN/1.73M2
EOSINOPHIL # BLD AUTO: 0.5 10E3/UL (ref 0–0.7)
EOSINOPHIL NFR BLD AUTO: 6 %
ERYTHROCYTE [DISTWIDTH] IN BLOOD BY AUTOMATED COUNT: 14.7 % (ref 10–15)
ERYTHROCYTE [SEDIMENTATION RATE] IN BLOOD BY WESTERGREN METHOD: 34 MM/HR (ref 0–20)
GLUCOSE SERPL-MCNC: 131 MG/DL (ref 70–99)
HCO3 SERPL-SCNC: 20 MMOL/L (ref 22–29)
HCT VFR BLD AUTO: 33.5 % (ref 40–53)
HGB BLD-MCNC: 10.9 G/DL (ref 13.3–17.7)
HOLD SPECIMEN: NORMAL
IMM GRANULOCYTES # BLD: 0 10E3/UL
IMM GRANULOCYTES NFR BLD: 0 %
LYMPHOCYTES # BLD AUTO: 1.9 10E3/UL (ref 0.8–5.3)
LYMPHOCYTES NFR BLD AUTO: 23 %
MCH RBC QN AUTO: 29.7 PG (ref 26.5–33)
MCHC RBC AUTO-ENTMCNC: 32.5 G/DL (ref 31.5–36.5)
MCV RBC AUTO: 91 FL (ref 78–100)
MONOCYTES # BLD AUTO: 0.8 10E3/UL (ref 0–1.3)
MONOCYTES NFR BLD AUTO: 10 %
NEUTROPHILS # BLD AUTO: 4.9 10E3/UL (ref 1.6–8.3)
NEUTROPHILS NFR BLD AUTO: 60 %
NRBC # BLD AUTO: 0 10E3/UL
NRBC BLD AUTO-RTO: 0 /100
PLATELET # BLD AUTO: 245 10E3/UL (ref 150–450)
POTASSIUM SERPL-SCNC: 4.9 MMOL/L (ref 3.4–5.3)
RBC # BLD AUTO: 3.67 10E6/UL (ref 4.4–5.9)
SODIUM SERPL-SCNC: 139 MMOL/L (ref 135–145)
T4 FREE SERPL-MCNC: 1.29 NG/DL (ref 0.9–1.7)
TSH SERPL DL<=0.005 MIU/L-ACNC: 4.96 UIU/ML (ref 0.3–4.2)
WBC # BLD AUTO: 8.1 10E3/UL (ref 4–11)

## 2024-11-14 PROCEDURE — 85652 RBC SED RATE AUTOMATED: CPT | Performed by: PHYSICIAN ASSISTANT

## 2024-11-14 PROCEDURE — G0463 HOSPITAL OUTPT CLINIC VISIT: HCPCS

## 2024-11-14 PROCEDURE — 84520 ASSAY OF UREA NITROGEN: CPT | Performed by: PHYSICIAN ASSISTANT

## 2024-11-14 PROCEDURE — 84443 ASSAY THYROID STIM HORMONE: CPT | Performed by: PHYSICIAN ASSISTANT

## 2024-11-14 PROCEDURE — 85041 AUTOMATED RBC COUNT: CPT | Performed by: PHYSICIAN ASSISTANT

## 2024-11-14 PROCEDURE — 85004 AUTOMATED DIFF WBC COUNT: CPT | Performed by: PHYSICIAN ASSISTANT

## 2024-11-14 PROCEDURE — 84132 ASSAY OF SERUM POTASSIUM: CPT | Performed by: PHYSICIAN ASSISTANT

## 2024-11-14 PROCEDURE — 84439 ASSAY OF FREE THYROXINE: CPT | Performed by: PHYSICIAN ASSISTANT

## 2024-11-14 PROCEDURE — 99213 OFFICE O/P EST LOW 20 MIN: CPT | Performed by: PHYSICIAN ASSISTANT

## 2024-11-14 PROCEDURE — 36415 COLL VENOUS BLD VENIPUNCTURE: CPT | Performed by: PHYSICIAN ASSISTANT

## 2024-11-14 PROCEDURE — 86140 C-REACTIVE PROTEIN: CPT | Performed by: PHYSICIAN ASSISTANT

## 2024-11-14 RX ORDER — PREDNISONE 10 MG/1
TABLET ORAL
Qty: 18 TABLET | Refills: 0 | Status: SHIPPED | OUTPATIENT
Start: 2024-11-14

## 2024-11-14 ASSESSMENT — ENCOUNTER SYMPTOMS
MYALGIAS: 1
JOINT SWELLING: 0
HEADACHES: 0
FEVER: 0
RESPIRATORY NEGATIVE: 1
ARTHRALGIAS: 1
CARDIOVASCULAR NEGATIVE: 1
FATIGUE: 0
WOUND: 1

## 2024-11-14 ASSESSMENT — ACTIVITIES OF DAILY LIVING (ADL)
ADLS_ACUITY_SCORE: 0

## 2024-11-14 NOTE — DISCHARGE INSTRUCTIONS
"Will treat with prednisone. This will cover PMR (polymyalgia rheumatica) and a pinched nerve. This can increase appetite/energy and decrease the need for sleep.   - Take 3 tabs by mouth for 3 days. (Take 3 now, then in the mornings with food)  - Take 2 tabs by mouth for 3 days.   - Take 1 tab by mouth for 3 days.     Call Francisco Wren's office for either follow up visit or advice regarding a phone visit next week.    The inflammatory markers are slightly elevated, but this is not extreme and not a \"slam dunk\" for \"polymyalgia rheumatica\"/PMR. Will need to follow your other labs at your annual exam. They are not perfect, but they are acceptable.      Back here with: pain out of proportion, pain with chewing, fevers, rashes, red/hot/tender swollen joints.   "

## 2024-11-14 NOTE — ED TRIAGE NOTES
Pt presents with concerns of right leg and hip pain starting a few days ago. Pt reports doing yard work which lead to left leg pain. Pt reports finding a wound on left ankle. Pt reports unsure of tick bite.

## 2024-11-14 NOTE — ED PROVIDER NOTES
History     Chief Complaint   Patient presents with    Leg Pain    Hip Pain     HPI  Lio Daly is a 89 year old male, PMHx below, who presents with bilateral upper leg pain (rubbing the hips/thighs) and he found a spot on his left ankle that he thinks may be a bite, although he noticed this area after removing his work boots after working in the yard. Pain is described as soreness to sharp pain and is more severe with weight bearing (LT worse than right over the past 24 hrs). Left leg pain is also made worse with raising/flexing at the hip and goes into the posterior thigh, stops at the knee. He denies any shoulder pain/soreness. He denies any fevers, headaches, joint swelling. No rashes other than the spot on the LT ankle. No known tick bites this summer/fall.       Allergies:  No Known Allergies    Problem List:    Patient Active Problem List    Diagnosis Date Noted    History of right breast cancer 11/24/2021     Priority: Medium    BRCA2 positive 08/17/2018     Priority: Medium    Anxiety 08/21/2017     Priority: Medium    Controlled substance agreement signed 08/21/2017     Priority: Medium    HTN, goal below 140/80 10/11/2016     Priority: Medium    Hypothyroidism, unspecified type 10/11/2016     Priority: Medium    ACP (advance care planning) 06/17/2016     Priority: Medium     Advance Care Planning 7/10/2017: ACP Review of Chart / Resources Provided:  Reviewed chart for advance care plan.  Lio Daly has no plan or code status on file. Discussed available resources and provided with information.   Added by Tonia Noriega        Advance Care Planning 6/17/2016: ACP Review of Chart / Resources Provided:  Reviewed chart for advance care plan.  Lio Daly has been provided information and resources to begin or update their advance care plan.  Added by Flor Wyman      Advance Care Planning 6/20/2017: ACP Review of Chart / Resources Provided:  Reviewed chart for advance care plan.   Lio Daly has been provided information and resources to begin or update their advance care plan.  Added by Flor Wyman            Prostatism 07/14/2015     Priority: Medium    Gastroesophageal reflux disease without esophagitis 05/18/2015     Priority: Medium    Other specified hypothyroidism 05/18/2015     Priority: Medium    Prediabetes      Priority: Medium        Past Medical History:    Past Medical History:   Diagnosis Date    Arthritis     Cancer (H)     Dysphagia 06/28/2004    HTN (hypertension)     Malignant neoplasm of right male breast (H)     Need for prophylactic vaccination and inoculation 12/10/2001    Need for prophylactic vaccination and inoculation, 11/06/2003    Other and unspecified hyperlipidemia 05/01/2001    Prediabetes     Shortness of breath 03/28/2006    Shoulder pain 06/15/2012    Thyroid disease     Unspecified essential hypertension 05/18/2000       Past Surgical History:    Past Surgical History:   Procedure Laterality Date    CARDIAC SURGERY      angiogram 1992 [facility]    COLONOSCOPY      1998    GENITOURINARY SURGERY      prostate bx [place]    GENITOURINARY SURGERY      Prostate bx [facility]    GI SURGERY      EGD    GI SURGERY      Pt has EGD with removal of food from esophagus    MASTECTOMY SIMPLE, SENTINEL NODE, COMBINED Right 7/27/2015    Procedure: COMBINED MASTECTOMY SIMPLE, SENTINEL NODE;  Surgeon: Aliyah Garsia MD;  Location: [facility]    ORTHOPEDIC SURGERY      bilateral knee replacements       Family History:    Family History   Problem Relation Age of Onset    Cancer - colorectal Mother     Eye Disorder Mother         cataracts    Musculoskeletal Disorder Mother         CTS    Colon Cancer Mother     Other Cancer Mother     Cancer Father         lung cancer    Genitourinary Problems Father         prostate problems    Other Cancer Father         lung    C.A.D. Brother         CABG    Eye Disorder Brother         cataracts    Breast Cancer Other     Ovarian  "Cancer Other     Pancreatic Cancer Other     Diabetes No family hx of     Coronary Artery Disease No family hx of     Cerebrovascular Disease No family hx of     Hyperlipidemia No family hx of     Hypertension No family hx of     Prostate Cancer No family hx of     Thyroid Disease No family hx of     Genetic Disorder No family hx of     Asthma No family hx of     Anesthesia Reaction No family hx of        Social History:  Marital Status:   [5]  Social History     Tobacco Use    Smoking status: Former     Current packs/day: 0.00     Average packs/day: 0.5 packs/day for 47.0 years (23.5 ttl pk-yrs)     Types: Cigarettes     Start date: 1948     Quit date: 1995     Years since quittin.8    Smokeless tobacco: Never   Substance Use Topics    Alcohol use: No     Comment: former    Drug use: No        Medications:    predniSONE (DELTASONE) 10 MG tablet  acetaminophen (TYLENOL) 325 MG tablet  ALPRAZolam (XANAX) 0.5 MG tablet  Ascorbic Acid (VITAMIN C PO)  aspirin (ASA) 325 MG EC tablet  atenolol (TENORMIN) 50 MG tablet  dutasteride (AVODART) 0.5 MG capsule  levothyroxine (SYNTHROID/LEVOTHROID) 88 MCG tablet  omeprazole (PRILOSEC) 20 MG DR capsule  pravastatin (PRAVACHOL) 40 MG tablet  sennosides (SENOKOT) 8.6 MG tablet  tamoxifen (NOLVADEX) 20 MG tablet  tamsulosin (FLOMAX) 0.4 MG capsule  triamcinolone (ARISTOCORT HP) 0.5 % external cream          Review of Systems   Constitutional:  Negative for fatigue and fever.   Respiratory: Negative.     Cardiovascular: Negative.    Musculoskeletal:  Positive for arthralgias and myalgias. Negative for joint swelling.   Skin:  Positive for wound (small area LT ankle).   Neurological:  Negative for headaches.       Physical Exam   BP: 110/73  Pulse: 65  Temp: 97.6  F (36.4  C)  Resp: 22  Height: 167.6 cm (5' 6\")  Weight: 72.6 kg (160 lb)  SpO2: 97 %      Physical Exam  Vitals and nursing note reviewed.   Constitutional:       General: He is not in acute " distress.     Appearance: He is not toxic-appearing.   Cardiovascular:      Rate and Rhythm: Normal rate.   Pulmonary:      Effort: Pulmonary effort is normal.   Musculoskeletal:      Lumbar back: Tenderness (as indicated by O) present. No bony tenderness.        Back:         Legs:       Comments: Small (2mm) area bloody crusting as indicated LT ankle. NO surrounding erythema and NO swelling.     Sensation intact medial leg bilaterally. Pt can extend at the knees bilaterally, although < ROM due to pain (LT > RT). Sensation intact over dorsal surface of feet bilaterally. Pt can dorsiflex feet bilaterally. Sensation intact along lateral surface foot bilaterally. Pt can plantarflex bilaterally.      Skin:     General: Skin is warm and dry.   Neurological:      Mental Status: He is alert and oriented to person, place, and time.         ED Course     Results for orders placed or performed during the hospital encounter of 11/14/24 (from the past 24 hours)   CBC with platelets differential    Narrative    The following orders were created for panel order CBC with platelets differential.  Procedure                               Abnormality         Status                     ---------                               -----------         ------                     CBC with platelets and d...[[id number]]  Abnormal            Final result                 Please view results for these tests on the individual orders.   Basic metabolic panel   Result Value Ref Range    Sodium 139 135 - 145 mmol/L    Potassium 4.9 3.4 - 5.3 mmol/L    Chloride 107 98 - 107 mmol/L    Carbon Dioxide (CO2) 20 (L) 22 - 29 mmol/L    Anion Gap 12 7 - 15 mmol/L    Urea Nitrogen 25.3 (H) 8.0 - 23.0 mg/dL    Creatinine 1.47 (H) 0.67 - 1.17 mg/dL    GFR Estimate 45 (L) >60 mL/min/1.73m2    Calcium 8.9 8.8 - 10.4 mg/dL    Glucose 131 (H) 70 - 99 mg/dL   Erythrocyte sedimentation rate auto   Result Value Ref Range    Erythrocyte Sedimentation Rate 34 (H) 0 - 20  mm/hr   CBC with platelets and differential   Result Value Ref Range    WBC Count 8.1 4.0 - 11.0 10e3/uL    RBC Count 3.67 (L) 4.40 - 5.90 10e6/uL    Hemoglobin 10.9 (L) 13.3 - 17.7 g/dL    Hematocrit 33.5 (L) 40.0 - 53.0 %    MCV 91 78 - 100 fL    MCH 29.7 26.5 - 33.0 pg    MCHC 32.5 31.5 - 36.5 g/dL    RDW 14.7 10.0 - 15.0 %    Platelet Count 245 150 - 450 10e3/uL    % Neutrophils 60 %    % Lymphocytes 23 %    % Monocytes 10 %    % Eosinophils 6 %    % Basophils 1 %    % Immature Granulocytes 0 %    NRBCs per 100 WBC 0 <1 /100    Absolute Neutrophils 4.9 1.6 - 8.3 10e3/uL    Absolute Lymphocytes 1.9 0.8 - 5.3 10e3/uL    Absolute Monocytes 0.8 0.0 - 1.3 10e3/uL    Absolute Eosinophils 0.5 0.0 - 0.7 10e3/uL    Absolute Basophils 0.1 0.0 - 0.2 10e3/uL    Absolute Immature Granulocytes 0.0 <=0.4 10e3/uL    Absolute NRBCs 0.0 10e3/uL   Extra Tube    Narrative    The following orders were created for panel order Extra Tube.  Procedure                               Abnormality         Status                     ---------                               -----------         ------                     Extra Blue Top Tube[[id number]]                              Final result               Extra Red Top Tube[[id number]]                               Final result               Extra Heparinized Syringe[[id number]]                        Final result                 Please view results for these tests on the individual orders.   Extra Blue Top Tube   Result Value Ref Range    Hold Specimen JIC    Extra Red Top Tube   Result Value Ref Range    Hold Specimen JIC    Extra Heparinized Syringe   Result Value Ref Range    Hold Specimen JIC    CRP inflammation   Result Value Ref Range    CRP Inflammation 13.37 (H) <5.00 mg/L   TSH with free T4 reflex   Result Value Ref Range    TSH 4.96 (H) 0.30 - 4.20 uIU/mL   T4 free   Result Value Ref Range    Free T4 1.29 0.90 - 1.70 ng/dL       Medications - No data to display    Assessments & Plan  (with Medical Decision Making)     I have reviewed the nursing notes.  I have reviewed the findings, diagnosis, plan and need for follow up with the patient.    Discharge Medication List as of 11/14/2024 11:58 AM        START taking these medications    Details   predniSONE (DELTASONE) 10 MG tablet Take 3 tabs (30mg) by mouth for 3 days, then take 2 tabs (20mg) by mouth for 3 days, then take 1 (10mg) tab by mouth for 3 days., Disp-18 tablet, R-0, E-Prescribe             Final diagnoses:   Bilateral leg pain   Labs obtained as above and are acceptable. We discussed results and clinical picture and after some discussion with patient and daughter, will cover PMR vs radiculitis with prednisone although Ed is not excited about more medications nor prednisone in general. We discussed ASE Rx. Starting at hopefully therapeutic dose for his size (30mg) and taper over 10 days. He will contact PCP's office for F/U, ideally early next week. Reviewed red flags and these are listed in AVS, along with written instructions, for his/family's reference. Will return here/ER with ANY worsening or concern.     11/14/2024   [facility]       Romario Caldwell PA  11/14/24 [id number]

## 2024-12-04 ENCOUNTER — ONCOLOGY VISIT (OUTPATIENT)
Dept: ONCOLOGY | Facility: OTHER | Age: 89
End: 2024-12-04
Attending: NURSE PRACTITIONER
Payer: MEDICARE

## 2024-12-04 VITALS
OXYGEN SATURATION: 97 % | RESPIRATION RATE: 20 BRPM | WEIGHT: 173.06 LBS | HEIGHT: 67 IN | BODY MASS INDEX: 27.16 KG/M2 | TEMPERATURE: 97.9 F | DIASTOLIC BLOOD PRESSURE: 62 MMHG | SYSTOLIC BLOOD PRESSURE: 110 MMHG | HEART RATE: 66 BPM

## 2024-12-04 DIAGNOSIS — Z15.09 BRCA2 POSITIVE: ICD-10-CM

## 2024-12-04 DIAGNOSIS — Z15.01 BRCA2 POSITIVE: ICD-10-CM

## 2024-12-04 DIAGNOSIS — Z85.3 HISTORY OF RIGHT BREAST CANCER: Primary | ICD-10-CM

## 2024-12-04 PROCEDURE — G0463 HOSPITAL OUTPT CLINIC VISIT: HCPCS

## 2024-12-04 ASSESSMENT — PAIN SCALES - GENERAL: PAINLEVEL_OUTOF10: NO PAIN (0)

## 2024-12-04 NOTE — PROGRESS NOTES
"Oncology Follow-up Visit:  December 4, 2024    Reason for Visit:  Patient presents with:  Oncology Clinic Visit: Follow up History of right breast cancer      Nursing Note and documentation reviewed: yes    HPI:   This is a 89-year-old male patient who  presents to the oncology clinic today in follow-up of a history of right breast cancer.  Patient underwent a right mastectomy with axillary node dissection in July 2015. He was placed on 20 mg of tamoxifen and remains on this.      He presents to the clinic today accompanied by his daughter.  He states he is doing well and he is \"fit as a fiddle\".  Denies any concerns with any new lesions or skin changes to the chest wall and no new masses.  He does have ongoing lymphedema to the right upper extremity but this is essentially unchanged.  He states he has been well over the past year.  He is due to see his PCP on January 4 for his annual physical exam.    Note he was evaluated in the emergency room on 11/14/2024 with complaints of bilateral lower extremity discomfort and weakness.  He had been having difficulty with his back and had seen a chiropractor just prior to this which extremely helped his back pain and he questions if he may have done too much due to the lack of discomfort he was having.  He did have an elevation of his CRP and sed rate and he was placed on prednisone which did essentially resolve his symptoms.  He has been off of the prednisone for about a week and a half and he has not had any worsening of his symptoms.    Oncologic History:     Patient had presented with complaints of right nipple irritation over a six-month period and was noted to have a mass in the right breast. Mammogram was completed followed by biopsy and pathology showed an invasive carcinoma. On 7/27/2015, he underwent a right modified radical mastectomy with axillary node dissection by Dr. Garsia. Pathology was consistent with an invasive ductal carcinoma, tumor was measured 2 x 2 x " 2 cm and there was noted perineural and lymphovascular invasion; grade 1. DCIS was present, grade 2 with expansive comedonecrosis. Node dissection revealed 1/17 nodes revealing micrometastasis. Tumor was ER/IA positive, HER-2/thomas negative. Patient was staged pathologic T1c N1mi M0.      At consultation, patient was not interested in adjuvant chemotherapy and was placed on tamoxifen 20 mg daily.      BRCA 2+      Current Chemo Regime/TX: Tamoxifen 20mg daily initiated 8/2015  Current Cycle: n/a  # of completed cycles: n/a      Previous treatment:  n/a    Past Medical History:   Diagnosis Date    Arthritis     Cancer (H)     Dysphagia 06/28/2004    HTN (hypertension)     Malignant neoplasm of right male breast (H)     Need for prophylactic vaccination and inoculation 12/10/2001    Need for prophylactic vaccination and inoculation, 11/06/2003    Other and unspecified hyperlipidemia 05/01/2001    Prediabetes     Shortness of breath 03/28/2006    Shoulder pain 06/15/2012    Thyroid disease     Unspecified essential hypertension 05/18/2000       Past Surgical History:   Procedure Laterality Date    CARDIAC SURGERY      angiogram 1992 [facility]    COLONOSCOPY      1998    GENITOURINARY SURGERY      prostate bx [place]    GENITOURINARY SURGERY      Prostate bx [facility]    GI SURGERY      EGD    GI SURGERY      Pt has EGD with removal of food from esophagus    MASTECTOMY SIMPLE, SENTINEL NODE, COMBINED Right 7/27/2015    Procedure: COMBINED MASTECTOMY SIMPLE, SENTINEL NODE;  Surgeon: Aliyah Garsia MD;  Location: [facility]    ORTHOPEDIC SURGERY      bilateral knee replacements       Family History   Problem Relation Age of Onset    Cancer - colorectal Mother     Eye Disorder Mother         cataracts    Musculoskeletal Disorder Mother         CTS    Colon Cancer Mother     Other Cancer Mother     Cancer Father         lung cancer    Genitourinary Problems Father         prostate problems    Other Cancer Father         lung     MALLORY Brother         CABG    Eye Disorder Brother         cataracts    Breast Cancer Other     Ovarian Cancer Other     Pancreatic Cancer Other     Diabetes No family hx of     Coronary Artery Disease No family hx of     Cerebrovascular Disease No family hx of     Hyperlipidemia No family hx of     Hypertension No family hx of     Prostate Cancer No family hx of     Thyroid Disease No family hx of     Genetic Disorder No family hx of     Asthma No family hx of     Anesthesia Reaction No family hx of        Social History     Socioeconomic History    Marital status:      Spouse name: Marisa    Number of children: Not on file    Years of education: Not on file    Highest education level: Not on file   Occupational History    Not on file   Tobacco Use    Smoking status: Former     Current packs/day: 0.00     Average packs/day: 0.5 packs/day for 47.0 years (23.5 ttl pk-yrs)     Types: Cigarettes     Start date: 1948     Quit date: 1995     Years since quittin.9    Smokeless tobacco: Never   Substance and Sexual Activity    Alcohol use: No     Comment: former    Drug use: No    Sexual activity: Not on file   Other Topics Concern     Service Not Asked    Blood Transfusions Not Asked    Caffeine Concern Yes     Comment: coffee, 2 cups daily    Occupational Exposure Not Asked    Hobby Hazards Not Asked    Sleep Concern Not Asked    Stress Concern Not Asked    Weight Concern Not Asked    Special Diet Not Asked    Back Care Not Asked    Exercise Not Asked    Bike Helmet Not Asked    Seat Belt Not Asked    Self-Exams Not Asked    Parent/sibling w/ CABG, MI or angioplasty before 65F 55M? No   Social History Narrative    Not on file     Social Drivers of Health     Financial Resource Strain: Low Risk  (2024)    Financial Resource Strain     Within the past 12 months, have you or your family members you live with been unable to get utilities (heat, electricity) when it was really needed?: No    Food Insecurity: Low Risk  (1/23/2024)    Food Insecurity     Within the past 12 months, did you worry that your food would run out before you got money to buy more?: No     Within the past 12 months, did the food you bought just not last and you didn t have money to get more?: No   Transportation Needs: Low Risk  (1/23/2024)    Transportation Needs     Within the past 12 months, has lack of transportation kept you from medical appointments, getting your medicines, non-medical meetings or appointments, work, or from getting things that you need?: No   Physical Activity: Not on file   Stress: Not on file   Social Connections: Not on file   Interpersonal Safety: Low Risk  (12/4/2024)    Interpersonal Safety     Do you feel physically and emotionally safe where you currently live?: Yes     Within the past 12 months, have you been hit, slapped, kicked or otherwise physically hurt by someone?: No     Within the past 12 months, have you been humiliated or emotionally abused in other ways by your partner or ex-partner?: No   Housing Stability: Low Risk  (1/23/2024)    Housing Stability     Do you have housing? : Yes     Are you worried about losing your housing?: No       Current Outpatient Medications   Medication Sig Dispense Refill    acetaminophen (TYLENOL) 325 MG tablet Take 325-650 mg by mouth every 6 hours as needed for mild pain      ALPRAZolam (XANAX) 0.5 MG tablet TAKE 1 TO 2 TABLETS BY MOUTH DAILY AT BEDTIME AS NEEDED 60 tablet 0    Ascorbic Acid (VITAMIN C PO) Take 500 mg by mouth daily      aspirin (ASA) 325 MG EC tablet Take 1 tablet (325 mg) by mouth three times a week 30 tablet 0    atenolol (TENORMIN) 50 MG tablet TAKE 1 TABLET BY MOUTH DAILY 90 tablet 3    dutasteride (AVODART) 0.5 MG capsule TAKE 1 CAPSULE BY MOUTH DAILY 90 capsule 3    levothyroxine (SYNTHROID/LEVOTHROID) 88 MCG tablet TAKE 1 TABLET BY MOUTH DAILY 90 tablet 2    omeprazole (PRILOSEC) 20 MG DR capsule TAKE 1 CAPSULE BY MOUTH DAILY 90  "capsule 3    pravastatin (PRAVACHOL) 40 MG tablet TAKE 1 TABLET BY MOUTH EVERY MORNING AND 1 EVERY OTHER EVENING 135 tablet 3    sennosides (SENOKOT) 8.6 MG tablet Take 2 tablets by mouth At Bedtime Reported on 5/17/2017      tamoxifen (NOLVADEX) 20 MG tablet TAKE 1 TABLET BY MOUTH DAILY 90 tablet 0    tamsulosin (FLOMAX) 0.4 MG capsule TAKE 1 CAPSULE BY MOUTH EVERY MORNING AND 1 CAPSULE AT NIGHT 180 capsule 3    triamcinolone (ARISTOCORT HP) 0.5 % external cream APPLY SPARINGLY TO AFFECTED AREA 3 TIMES A DAY (Patient not taking: Reported on 12/4/2024) 30 g 0     No current facility-administered medications for this visit.        No Known Allergies    Review Of Systems:  Constitutional:    denies fever, weight changes, chills, and night sweats.  Eyes:    denies double vision  Ears/Nose/Throat:   denies ear pain, nose problems, difficulty swallowing  Respiratory:   denies shortness of breath, cough   Skin:   denies rash, lesions  Breast/Chest wall:   denies pain, lumps, nipple discharge or skin changes  Cardiovascular:   denies chest pain, palpitations, edema  Gastrointestinal:   denies abdominal pain, bloating, nausea, early satiety; no change in bowel habits or blood in stool  Genitourinary:   denies difficulty with urination, blood in urine  Musculoskeletal:    denies new muscle pain, bone pain  Neurologic:   denies lightheadedness, headaches, numbness or tingling  Psychiatric:   denies anxiety, depression  Hematologic/Lymphatic/Immunologic:   denies easy bruising, easy bleeding, lumps or bumps noted  Endocrine:   Denies increased thirst      Physical Exam:  /62   Pulse 66   Temp 97.9  F (36.6  C) (Tympanic)   Resp 20   Ht 1.702 m (5' 7\")   Wt 78.5 kg (173 lb 1 oz)   SpO2 97%   BMI 27.11 kg/m      GENERAL APPEARANCE: Healthy, alert and in no acute distress.  HEENT: Normocephalic, Sclerae anicteric.   NECK:   No asymmetry or masses, no thyromegaly.  LYMPHATICS: No palpable cervical, supraclavicular, " axillary, or inguinal nodes   RESP: Lungs with faint expiratory wheezes to the upper lobes bilaterally, respirations regular and easy  CARDIOVASCULAR: Regular rate and rhythm. Normal S1, S2  ABDOMEN: Soft, nontender. Bowel sounds auscultated all 4 quadrants. No palpable organomegaly or masses.  BREAST/Chest wall: No concerning skin changes or rashes or lumps to the chest wall   MUSCULOSKELETAL: Extremities without gross deformities noted.  Lymphedema to the right upper extremity  SKIN: No suspicious lesions or rashes to exposed skin  NEURO: Alert and oriented x 3.  Gait steady.  PSYCHIATRIC: Mentation and affect appear normal.  Mood appropriate.    Laboratory:  None completed for today's visit    Imaging Studies:    None completed for today's visit      ASSESSMENT/PLAN:    #1 history of breast cancer: History of  Stage 1, pT1c N1mi M0 carcinoma of the right breast diagnosed in July 2015.  He underwent mastectomy with node dissection with 1/17 nodes revealing micrometastases; tumor was 2cm, ER/ND positive, HER-2/thomas negative.  BRCA2 positive.  He'll continue with tamoxifen 20 mg daily and follow up in August 2025.  He will of completed 10 years of endocrine therapy at that time.    I encouraged patient to call with any questions or concerns.    The longitudinal plan of care for the diagnosis(es)/condition(s) as documented were addressed during this visit. Due to the added complexity in care, I will continue to support Ed in the subsequent management and with ongoing continuity of care.     Radha Sales, NP  APRN, FNP-BC, AOCNP

## 2024-12-04 NOTE — NURSING NOTE
"Oncology Rooming Note    December 4, 2024 1:45 PM   iLo Daly is a 89 year old male who presents for:    Chief Complaint   Patient presents with    Oncology Clinic Visit     Follow up History of right breast cancer      Initial Vitals: /62   Pulse 66   Temp 97.9  F (36.6  C) (Tympanic)   Resp 20   Ht 1.702 m (5' 7\")   Wt 78.5 kg (173 lb 1 oz)   SpO2 97%   BMI 27.11 kg/m   Estimated body mass index is 27.11 kg/m  as calculated from the following:    Height as of this encounter: 1.702 m (5' 7\").    Weight as of this encounter: 78.5 kg (173 lb 1 oz). Body surface area is 1.93 meters squared.  No Pain (0) Comment: Data Unavailable   No LMP for male patient.  Allergies reviewed: Yes  Medications reviewed: Yes    Medications: Medication refills not needed today.  Pharmacy name entered into [employer]: [facility] - [place], [place] - [address]    Frailty Screening:   Is the patient here for a new oncology consult visit in cancer care? 2. No            Brianna Alexandra LPN             "

## 2024-12-06 DIAGNOSIS — I10 HTN, GOAL BELOW 140/80: ICD-10-CM

## 2024-12-09 RX ORDER — ATENOLOL 50 MG/1
TABLET ORAL
Qty: 90 TABLET | Refills: 0 | Status: SHIPPED | OUTPATIENT
Start: 2024-12-09

## 2024-12-30 DIAGNOSIS — F41.9 ANXIETY: ICD-10-CM

## 2024-12-30 DIAGNOSIS — E03.9 HYPOTHYROIDISM, UNSPECIFIED TYPE: ICD-10-CM

## 2024-12-30 DIAGNOSIS — N40.0 PROSTATISM: ICD-10-CM

## 2024-12-30 RX ORDER — ALPRAZOLAM 0.5 MG
TABLET ORAL
Qty: 60 TABLET | Refills: 0 | Status: SHIPPED | OUTPATIENT
Start: 2024-12-30

## 2024-12-30 RX ORDER — LEVOTHYROXINE SODIUM 88 UG/1
TABLET ORAL
Qty: 90 TABLET | Refills: 1 | Status: SHIPPED | OUTPATIENT
Start: 2024-12-30

## 2024-12-30 RX ORDER — DUTASTERIDE 0.5 MG/1
1 CAPSULE, LIQUID FILLED ORAL DAILY
Qty: 90 CAPSULE | Refills: 1 | Status: SHIPPED | OUTPATIENT
Start: 2024-12-30

## 2025-01-29 NOTE — PATIENT INSTRUCTIONS
Refill Request    Medication request: HYDROcodone-acetaminophen  MG Oral Tab Take 1 tablet by mouth every 4-6 hours PRN pain (max 5 tabs in 24 hours).     LOV:1/20/2025 Juan A Mcgee MD   Due back to clinic per last office note:  \"The patient will follow-up with me in 3 months or sooner if needed. \"  NOV: 5/8/2025 Juan A Mcgee MD      [place]: 12/31/24 #150 - 30 day supply    Urine drug screen (if applicable): none  Pain contract: Valid until 1/24/26    LOV plan (if weaning or changing medications): Per  at last office visit: \"Patient will continue with taking no more than 5 Norco per day for his pain. \"              We would like to see you back in 1 year.  No labs.     When you are in need of a refill of your Tamoxifen, please call your pharmacy and they will send us a request.     If you have any questions please call [phone]    Other instructions:  none

## 2025-02-13 ENCOUNTER — OFFICE VISIT (OUTPATIENT)
Dept: FAMILY MEDICINE | Facility: OTHER | Age: OVER 89
End: 2025-02-13
Attending: FAMILY MEDICINE
Payer: MEDICARE

## 2025-02-13 VITALS
HEART RATE: 89 BPM | BODY MASS INDEX: 26.53 KG/M2 | OXYGEN SATURATION: 96 % | DIASTOLIC BLOOD PRESSURE: 78 MMHG | SYSTOLIC BLOOD PRESSURE: 122 MMHG | TEMPERATURE: 98.3 F | WEIGHT: 169 LBS | HEIGHT: 67 IN

## 2025-02-13 DIAGNOSIS — J42 CHRONIC BRONCHITIS, UNSPECIFIED CHRONIC BRONCHITIS TYPE (H): ICD-10-CM

## 2025-02-13 DIAGNOSIS — Z23 NEED FOR PROPHYLACTIC VACCINATION AND INOCULATION AGAINST INFLUENZA: ICD-10-CM

## 2025-02-13 DIAGNOSIS — E78.5 HYPERLIPIDEMIA LDL GOAL <130: ICD-10-CM

## 2025-02-13 DIAGNOSIS — N18.31 CHRONIC KIDNEY DISEASE, STAGE 3A (H): ICD-10-CM

## 2025-02-13 DIAGNOSIS — F41.9 ANXIETY: ICD-10-CM

## 2025-02-13 DIAGNOSIS — N40.0 BENIGN PROSTATIC HYPERPLASIA, UNSPECIFIED WHETHER LOWER URINARY TRACT SYMPTOMS PRESENT: ICD-10-CM

## 2025-02-13 DIAGNOSIS — Z85.3 HISTORY OF RIGHT BREAST CANCER: ICD-10-CM

## 2025-02-13 DIAGNOSIS — R21 RASH: ICD-10-CM

## 2025-02-13 DIAGNOSIS — Z00.00 MEDICARE ANNUAL WELLNESS VISIT, SUBSEQUENT: ICD-10-CM

## 2025-02-13 DIAGNOSIS — R26.89 BALANCE PROBLEMS: ICD-10-CM

## 2025-02-13 DIAGNOSIS — I10 HTN, GOAL BELOW 140/80: ICD-10-CM

## 2025-02-13 DIAGNOSIS — E03.9 HYPOTHYROIDISM, UNSPECIFIED TYPE: Primary | ICD-10-CM

## 2025-02-13 DIAGNOSIS — C50.911 PRIMARY BREAST MALIGNANCY, RIGHT (H): ICD-10-CM

## 2025-02-13 LAB
ALBUMIN SERPL BCG-MCNC: 4.2 G/DL (ref 3.5–5.2)
ALP SERPL-CCNC: 80 U/L (ref 40–150)
ALT SERPL W P-5'-P-CCNC: 12 U/L (ref 0–70)
ANION GAP SERPL CALCULATED.3IONS-SCNC: 12 MMOL/L (ref 7–15)
AST SERPL W P-5'-P-CCNC: 23 U/L (ref 0–45)
BILIRUB SERPL-MCNC: 0.2 MG/DL
BUN SERPL-MCNC: 30 MG/DL (ref 8–23)
CALCIUM SERPL-MCNC: 9.1 MG/DL (ref 8.8–10.4)
CHLORIDE SERPL-SCNC: 105 MMOL/L (ref 98–107)
CHOLEST SERPL-MCNC: 139 MG/DL
CREAT SERPL-MCNC: 1.26 MG/DL (ref 0.67–1.17)
EGFRCR SERPLBLD CKD-EPI 2021: 54 ML/MIN/1.73M2
FASTING STATUS PATIENT QL REPORTED: NO
FASTING STATUS PATIENT QL REPORTED: NO
GLUCOSE SERPL-MCNC: 126 MG/DL (ref 70–99)
HCO3 SERPL-SCNC: 22 MMOL/L (ref 22–29)
HDLC SERPL-MCNC: 46 MG/DL
LDLC SERPL CALC-MCNC: 49 MG/DL
NONHDLC SERPL-MCNC: 93 MG/DL
POTASSIUM SERPL-SCNC: 4.9 MMOL/L (ref 3.4–5.3)
PROT SERPL-MCNC: 7.5 G/DL (ref 6.4–8.3)
SODIUM SERPL-SCNC: 139 MMOL/L (ref 135–145)
TRIGL SERPL-MCNC: 219 MG/DL
TSH SERPL DL<=0.005 MIU/L-ACNC: 2.21 UIU/ML (ref 0.3–4.2)

## 2025-02-13 PROCEDURE — G0008 ADMIN INFLUENZA VIRUS VAC: HCPCS

## 2025-02-13 PROCEDURE — 82310 ASSAY OF CALCIUM: CPT | Mod: ZL | Performed by: FAMILY MEDICINE

## 2025-02-13 PROCEDURE — 84520 ASSAY OF UREA NITROGEN: CPT | Mod: ZL | Performed by: FAMILY MEDICINE

## 2025-02-13 PROCEDURE — G0463 HOSPITAL OUTPT CLINIC VISIT: HCPCS | Mod: 25

## 2025-02-13 PROCEDURE — 84443 ASSAY THYROID STIM HORMONE: CPT | Mod: ZL | Performed by: FAMILY MEDICINE

## 2025-02-13 PROCEDURE — 84155 ASSAY OF PROTEIN SERUM: CPT | Mod: ZL | Performed by: FAMILY MEDICINE

## 2025-02-13 PROCEDURE — 36415 COLL VENOUS BLD VENIPUNCTURE: CPT | Mod: ZL | Performed by: FAMILY MEDICINE

## 2025-02-13 PROCEDURE — 80061 LIPID PANEL: CPT | Mod: ZL | Performed by: FAMILY MEDICINE

## 2025-02-13 RX ORDER — ALPRAZOLAM 0.5 MG
.5-1 TABLET ORAL
Qty: 60 TABLET | Refills: 5 | Status: SHIPPED | OUTPATIENT
Start: 2025-02-13

## 2025-02-13 RX ORDER — TRIAMCINOLONE ACETONIDE 5 MG/G
CREAM TOPICAL
Qty: 30 G | Refills: 5 | Status: SHIPPED | OUTPATIENT
Start: 2025-02-13

## 2025-02-13 RX ORDER — TAMSULOSIN HYDROCHLORIDE 0.4 MG/1
0.4 CAPSULE ORAL 2 TIMES DAILY
Qty: 180 CAPSULE | Refills: 3 | Status: SHIPPED | OUTPATIENT
Start: 2025-02-13

## 2025-02-13 RX ORDER — TAMOXIFEN CITRATE 20 MG/1
20 TABLET ORAL DAILY
Qty: 90 TABLET | Refills: 3 | Status: SHIPPED | OUTPATIENT
Start: 2025-02-13

## 2025-02-13 SDOH — HEALTH STABILITY: PHYSICAL HEALTH: ON AVERAGE, HOW MANY DAYS PER WEEK DO YOU ENGAGE IN MODERATE TO STRENUOUS EXERCISE (LIKE A BRISK WALK)?: 5 DAYS

## 2025-02-13 ASSESSMENT — PAIN SCALES - GENERAL: PAINLEVEL_OUTOF10: NO PAIN (0)

## 2025-02-13 ASSESSMENT — SOCIAL DETERMINANTS OF HEALTH (SDOH): HOW OFTEN DO YOU GET TOGETHER WITH FRIENDS OR RELATIVES?: THREE TIMES A WEEK

## 2025-02-13 NOTE — LETTER
February 13, 2025      Ed JERI Daly  [id number]  [place] [address]        Dear ,    We are writing to inform you of your test results.    Stable all around.  Great.  Follow annually.  Fantastic.     Resulted Orders   Comprehensive metabolic panel   Result Value Ref Range    Sodium 139 135 - 145 mmol/L    Potassium 4.9 3.4 - 5.3 mmol/L    Carbon Dioxide (CO2) 22 22 - 29 mmol/L    Anion Gap 12 7 - 15 mmol/L    Urea Nitrogen 30.0 (H) 8.0 - 23.0 mg/dL    Creatinine 1.26 (H) 0.67 - 1.17 mg/dL    GFR Estimate 54 (L) >60 mL/min/1.73m2      Comment:      eGFR calculated using 2021 CKD-EPI equation.    Calcium 9.1 8.8 - 10.4 mg/dL    Chloride 105 98 - 107 mmol/L    Glucose 126 (H) 70 - 99 mg/dL    Alkaline Phosphatase 80 40 - 150 U/L    AST 23 0 - 45 U/L    ALT 12 0 - 70 U/L    Protein Total 7.5 6.4 - 8.3 g/dL    Albumin 4.2 3.5 - 5.2 g/dL    Bilirubin Total 0.2 <=1.2 mg/dL    Patient Fasting > 8hrs? No    Lipid Profile   Result Value Ref Range    Cholesterol 139 <200 mg/dL    Triglycerides 219 (H) <150 mg/dL    Direct Measure HDL 46 >=40 mg/dL    LDL Cholesterol Calculated 49 <100 mg/dL    Non HDL Cholesterol 93 <130 mg/dL    Patient Fasting > 8hrs? No     Narrative    Cholesterol  Desirable: < 200 mg/dL  Borderline High: 200 - 239 mg/dL  High: >= 240 mg/dL    Triglycerides  Normal: < 150 mg/dL  Borderline High: 150 - 199 mg/dL  High: 200-499 mg/dL  Very High: >= 500 mg/dL    Direct Measure HDL  Female: >= 50 mg/dL   Male: >= 40 mg/dL    LDL Cholesterol  Desirable: < 100 mg/dL  Above Desirable: 100 - 129 mg/dL   Borderline High: 130 - 159 mg/dL   High:  160 - 189 mg/dL   Very High: >= 190 mg/dL    Non HDL Cholesterol  Desirable: < 130 mg/dL  Above Desirable: 130 - 159 mg/dL  Borderline High: 160 - 189 mg/dL  High: 190 - 219 mg/dL  Very High: >= 220 mg/dL   TSH   Result Value Ref Range    TSH 2.21 0.30 - 4.20 uIU/mL       If you have any questions or concerns, please call the clinic at the number listed  above.       Sincerely,      Rubén Wren MD    Electronically signed

## 2025-02-13 NOTE — PROGRESS NOTES
"Preventive Care Visit  [facility]  Rubén Wren MD, Family Medicine  Feb 13, 2025      Assessment & Plan     Hypothyroidism, unspecified type  Update tsh and follow.    - TSH; Future    Hyperlipidemia LDL goal <130  Update lipids and follow.  No change in the meds.    - Comprehensive metabolic panel; Future  - Lipid Profile; Future    Need for prophylactic vaccination and inoculation against influenza  Given.   - INFLUENZA HIGH DOSE, TRIVALENT, PF (FLUZONE)    Medicare annual wellness visit, subsequent  Doing well.  Routine cares and follow.  Nice visit.      Anxiety  Stable.  Reliable use of xanax.  Not ideal at his age but it's worked for years and we're not changing anything.      History of right breast cancer  Doing well.  Tamoxifen sent.      HTN, goal below 140/80  Stable.     Balance problems  Recommend PT.    - Physical Therapy  Referral; Future            BMI  Estimated body mass index is 26.47 kg/m  as calculated from the following:    Height as of this encounter: 1.702 m (5' 7\").    Weight as of this encounter: 76.7 kg (169 lb).       Counseling  Appropriate preventive services were addressed with this patient via screening, questionnaire, or discussion as appropriate for fall prevention, nutrition, physical activity, Tobacco-use cessation, social engagement, weight loss and cognition.  Checklist reviewing preventive services available has been given to the patient.  Reviewed patient's diet, addressing concerns and/or questions.   The patient was instructed to see the dentist every 6 months.   Discussed possible causes of fatigue. Updated plan of care.  Patient reported difficulty with activities of daily living were addressed today.The patient was provided with written information regarding signs of hearing loss.           No follow-ups on file.    Subjective   Ed is a 89 year old, presenting for the following:  Wellness Visit and Imm/Inj (Flu Shot)        2/13/2025     1:02 PM "   Additional Questions   Roomed by Tonia   Accompanied by daughter           HPI        Health Care Directive  Patient does not have a Health Care Directive: has acp on file       2/13/2025   General Health   How would you rate your overall physical health? (!) FAIR   Feel stress (tense, anxious, or unable to sleep) Not at all         2/13/2025   Nutrition   Diet: Regular (no restrictions)         2/13/2025   Exercise   Days per week of moderate/strenous exercise 5 days         2/13/2025   Social Factors   Frequency of gathering with friends or relatives Three times a week   Worry food won't last until get money to buy more No   Food not last or not have enough money for food? No   Do you have housing? (Housing is defined as stable permanent housing and does not include staying ouside in a car, in a tent, in an abandoned building, in an overnight shelter, or couch-surfing.) Yes   Are you worried about losing your housing? No   Lack of transportation? No   Unable to get utilities (heat,electricity)? No         2/13/2025   Fall Risk   Fallen 2 or more times in the past year? Yes   Trouble with walking or balance? Yes   Gait Speed Test (Document in seconds) 4          2/13/2025   Activities of Daily Living- Home Safety   Needs help with the following daily activites Transportation   Safety concerns in the home None of the above         2/13/2025   Dental   Dentist two times every year? (!) NO         2/13/2025   Hearing Screening   Hearing concerns? (!) I NEED TO ASK PEOPLE TO SPEAK UP OR REPEAT THEMSELVES.    (!) TROUBLE UNDERSTANDING SPEECH ON THE TELEPHONE       Multiple values from one day are sorted in reverse-chronological order         2/13/2025   Driving Risk Screening   Patient/family members have concerns about driving No         2/13/2025   General Alertness/Fatigue Screening   Have you been more tired than usual lately? (!) YES         2/13/2025   Urinary Incontinence Screening   Bothered by leaking urine in  past 6 months No            Today's PHQ-2 Score:       2025     1:02 PM   PHQ-2 (  [employer])   Q1: Little interest or pleasure in doing things 0   Q2: Feeling down, depressed or hopeless 0   PHQ-2 Score 0    Q1: Little interest or pleasure in doing things Not at all   Q2: Feeling down, depressed or hopeless Not at all   PHQ-2 Score 0       Patient-reported           2025   Substance Use   Alcohol more than 3/day or more than 7/wk Not Applicable   Do you have a current opioid prescription? No   How severe/bad is pain from 1 to 10? 2/10   Do you use any other substances recreationally? No     Social History     Tobacco Use    Smoking status: Former     Current packs/day: 0.00     Average packs/day: 0.5 packs/day for 47.0 years (23.5 ttl pk-yrs)     Types: Cigarettes     Start date: 1948     Quit date: 1995     Years since quittin.0    Smokeless tobacco: Never   Substance Use Topics    Alcohol use: No     Comment: former    Drug use: No             Reviewed and updated as needed this visit by Provider                    Past Medical History:   Diagnosis Date    Arthritis     Cancer (H)     Dysphagia 2004    HTN (hypertension)     Malignant neoplasm of right male breast (H)     Need for prophylactic vaccination and inoculation 12/10/2001    Need for prophylactic vaccination and inoculation, 2003    Other and unspecified hyperlipidemia 2001    Prediabetes     Shortness of breath 2006    Shoulder pain 06/15/2012    Thyroid disease     Unspecified essential hypertension 2000     Past Surgical History:   Procedure Laterality Date    CARDIAC SURGERY      angiogram  [facility]    COLONOSCOPY          GENITOURINARY SURGERY      prostate bx [place]    GENITOURINARY SURGERY      Prostate bx [facility]    GI SURGERY      EGD    GI SURGERY      Pt has EGD with removal of food from esophagus    MASTECTOMY SIMPLE, SENTINEL NODE, COMBINED Right 2015    Procedure:  COMBINED MASTECTOMY SIMPLE, SENTINEL NODE;  Surgeon: Aliyah Garsia MD;  Location: [facility]    ORTHOPEDIC SURGERY      bilateral knee replacements     Current providers sharing in care for this patient include:  Patient Care Team:  Rubén Wren MD as PCP - General (Family Practice)  Radha Sales NP as Assigned Cancer Care Provider  Rubén Wren MD as Assigned PCP    The following health maintenance items are reviewed in Epic and correct as of today:  Health Maintenance   Topic Date Due    RSV VACCINE (1 - 1-dose 75+ series) Never done    ZOSTER IMMUNIZATION (2 of 3) 02/03/2017    INFLUENZA VACCINE (1) 09/01/2024    COVID-19 Vaccine (7 - 2024-25 season) 09/01/2024    ALT  01/23/2025    LIPID  01/23/2025    MEDICARE ANNUAL WELLNESS VISIT  01/23/2025    BMP  11/14/2025    TSH W/FREE T4 REFLEX  11/14/2025    FALL RISK ASSESSMENT  02/13/2026    DTAP/TDAP/TD IMMUNIZATION (4 - Td or Tdap) 11/09/2026    ADVANCE CARE PLANNING  01/23/2029    PHQ-2 (once per calendar year)  Completed    Pneumococcal Vaccine: 50+ Years  Completed    HPV IMMUNIZATION  Aged Out    MENINGITIS IMMUNIZATION  Aged Out         Review of Systems  CONSTITUTIONAL: NEGATIVE for fever, chills, change in weight  INTEGUMENTARY/SKIN: NEGATIVE for worrisome rashes, moles or lesions  EYES: NEGATIVE for vision changes or irritation  ENT/MOUTH: NEGATIVE for ear, mouth and throat problems  RESP: NEGATIVE for significant cough or SOB  BREAST: NEGATIVE for masses, tenderness or discharge  CV: NEGATIVE for chest pain, palpitations or peripheral edema  GI: NEGATIVE for nausea, abdominal pain, heartburn, or change in bowel habits  : NEGATIVE for frequency, dysuria, or hematuria  MUSCULOSKELETAL: NEGATIVE for significant arthralgias or myalgia  NEURO: NEGATIVE for weakness, dizziness or paresthesias  ENDOCRINE: NEGATIVE for temperature intolerance, skin/hair changes  HEME: NEGATIVE for bleeding problems  PSYCHIATRIC: NEGATIVE for changes in mood  "or affect     Objective    Exam  /78   Pulse 89   Temp 98.3  F (36.8  C) (Tympanic)   Ht 1.702 m (5' 7\")   Wt 76.7 kg (169 lb)   SpO2 96%   BMI 26.47 kg/m     Estimated body mass index is 26.47 kg/m  as calculated from the following:    Height as of this encounter: 1.702 m (5' 7\").    Weight as of this encounter: 76.7 kg (169 lb).    Physical Exam  GENERAL: alert and no distress  EYES: Eyes grossly normal to inspection, PERRL and conjunctivae and sclerae normal  HENT: ear canals and TM's normal, nose and mouth without ulcers or lesions  NECK: no adenopathy, no asymmetry, masses, or scars  RESP: lungs clear to auscultation - no rales, rhonchi or wheezes  CV: regular rate and rhythm, normal S1 S2, no S3 or S4, no murmur, click or rub, no peripheral edema  ABDOMEN: soft, nontender, no hepatosplenomegaly, no masses and bowel sounds normal  MS: no gross musculoskeletal defects noted, no edema  SKIN: no suspicious lesions or rashes  NEURO: Normal strength and tone, mentation intact and speech normal  PSYCH: mentation appears normal, affect normal/bright  Labs stable.      The longitudinal plan of care for the diagnosis(es)/condition(s) as documented were addressed during this visit. Due to the added complexity in care, I will continue to support Ed in the subsequent management and with ongoing continuity of care.        2/13/2025   Mini Cog   Clock Draw Score 2 Normal   3 Item Recall 2 objects recalled   Mini Cog Total Score 4              Signed Electronically by: Rubén Wren MD    "

## 2025-03-05 ENCOUNTER — THERAPY VISIT (OUTPATIENT)
Dept: PHYSICAL THERAPY | Facility: HOSPITAL | Age: OVER 89
End: 2025-03-05
Attending: FAMILY MEDICINE
Payer: MEDICARE

## 2025-03-05 DIAGNOSIS — R26.89 BALANCE PROBLEMS: ICD-10-CM

## 2025-03-05 PROCEDURE — 97162 PT EVAL MOD COMPLEX 30 MIN: CPT | Mod: GP

## 2025-03-05 PROCEDURE — 97530 THERAPEUTIC ACTIVITIES: CPT | Mod: GP

## 2025-03-05 NOTE — PROGRESS NOTES
PHYSICAL THERAPY EVALUATION  Type of Visit: Evaluation       Fall Risk Screen:  Fall screen completed by: PT  Have you fallen 2 or more times in the past year?: Yes  Have you fallen and had an injury in the past year?: Yes  Is patient a fall risk?: Yes; Department fall risk interventions implemented    Subjective         Presenting condition or subjective complaint: balance  Date of onset: 02/13/25    Relevant medical history: Arthritis; Cancer   Dates & types of surgery:    PAST MEDICAL HISTORY:   Past Medical History:   Diagnosis Date    Arthritis     Cancer (H)     Dysphagia 06/28/2004    HTN (hypertension)     Malignant neoplasm of right male breast (H)     Need for prophylactic vaccination and inoculation 12/10/2001    Need for prophylactic vaccination and inoculation, 11/06/2003    Other and unspecified hyperlipidemia 05/01/2001    Prediabetes     Shortness of breath 03/28/2006    Shoulder pain 06/15/2012    Thyroid disease     Unspecified essential hypertension 05/18/2000       PAST SURGICAL HISTORY:   Past Surgical History:   Procedure Laterality Date    CARDIAC SURGERY      angiogram 1992 [facility]    COLONOSCOPY      1998    GENITOURINARY SURGERY      prostate bx [place]    GENITOURINARY SURGERY      Prostate bx [facility]    GI SURGERY      EGD    GI SURGERY      Pt has EGD with removal of food from esophagus    MASTECTOMY SIMPLE, SENTINEL NODE, COMBINED Right 7/27/2015    Procedure: COMBINED MASTECTOMY SIMPLE, SENTINEL NODE;  Surgeon: Aliyah Garsia MD;  Location: [facility]    ORTHOPEDIC SURGERY      bilateral knee replacements       FAMILY HISTORY:   Family History   Problem Relation Age of Onset    Cancer - colorectal Mother     Eye Disorder Mother         cataracts    Musculoskeletal Disorder Mother         CTS    Colon Cancer Mother     Other Cancer Mother     Cancer Father         lung cancer    Genitourinary Problems Father         prostate problems    Other Cancer Father         lung    C.A.LEYDA  Brother         CABG    Eye Disorder Brother         cataracts    Breast Cancer Other     Ovarian Cancer Other     Pancreatic Cancer Other     Diabetes No family hx of     Coronary Artery Disease No family hx of     Cerebrovascular Disease No family hx of     Hyperlipidemia No family hx of     Hypertension No family hx of     Prostate Cancer No family hx of     Thyroid Disease No family hx of     Genetic Disorder No family hx of     Asthma No family hx of     Anesthesia Reaction No family hx of        SOCIAL HISTORY:   Social History     Tobacco Use    Smoking status: Former     Current packs/day: 0.00     Average packs/day: 0.5 packs/day for 47.0 years (23.5 ttl pk-yrs)     Types: Cigarettes     Start date: 1948     Quit date: 1995     Years since quittin.2    Smokeless tobacco: Never   Substance Use Topics    Alcohol use: No     Comment: former         Prior diagnostic imaging/testing results: Other     Prior therapy history for the same diagnosis, illness or injury: No      Prior Level of Function  Transfers: Assistive equipment  Ambulation: Assistive equipment    Living Environment  Social support: Alone   Type of home: House   Stairs to enter the home: Yes 4 Is there a railing: Yes     Ramp: No   Stairs inside the home: No       Help at home: Self Cares (home health aide/personal care attendant, family, etc)  Equipment owned: Straight Cane     Employment: Not Applicable    Hobbies/Interests:  AA meetings (40year medalion- 13 steps to get into meetings)    Patient goals for therapy: tightrope walking    Pain assessment: Pain denied     Objective     Screening questions:  Recent medication changes?  No  Recent visual changes?  No  History of migraines?  No  Alar Ligament Test   Negative  Transverse Ligament Instability Test  Negative   Cognition/Mental Status: Alert & Oriented x 3  Communications: English    Patient denies any recent URI, inner ear symptoms, or sinus congestion.  Functional  Status  Functional Activity  Currently Working: Yes  Medical History  Surgery History, Medical Conditions, Medication History, Comments:  Reviewed medical history form with patient.  See chart for details.  Symptom Report: DHI        OBJECTIVE   Objective  System Review  Integumentary - Intact to all visible surfaces. Patient denies any open wounds or sores.   Hearing - Intact to finger rub bilaterally and patient denies any recent hearing loss     Oculomotor & Vestibulo-Ocular Exam  Examination Findings  Skew Deviation - None noted   Resting nystagmus - None noted   Gaze-Evoked Nystagmus - WNL   Saccades - WNL  VOR- Cancellation - WNL  Head Thrust Right - WNL  Head Thrust Left - WNL  Static Visual Acuity With Glasses on  20/20  Dynamic Visual Acuity  20/50  4 lines lost  Vergence - Diminished  Accomodation -Diminished  AROM  WFL to hallpike joshua testing     General Physical Status  Vertebral Artery Testing Position: Modified Upright testing  Right - Negative  Left -Negative  Coordination Testing  Finger to nose  WNL  Postural Control: Diminished  Romberg: Assistance to prevent fall  Single Leg Stance Right Foot: Assistance to prevent fall  Single Leg Stance Left Foot: Assistance to prevent fall  Hallpike-Joshua- not indicated at this time  BOW - Not indicated  LEAN - Not indicated  Resting Nystagmus: WNL  Gaze Holding Nystagmus: WNL  Headshake induced nystagmus:  nystagmus not noted        Assessment & Plan   CLINICAL IMPRESSIONS  Medical Diagnosis: Balance problems (R26.89)    Treatment Diagnosis: Balance problems (R26.89)   Impression/Assessment: Patient is a 89 year old male with balance concerns consistent with proprioceptive deficits presentation.  The following significant findings have been identified: Impaired balance, Decreased proprioception, Impaired gait, and Decreased activity tolerance. These impairments interfere with their ability to perform self care tasks, recreational activities, household chores,  driving , household mobility, and community mobility as compared to previous level of function.     Clinical Decision Making (Complexity):  Clinical Presentation: Evolving/Changing  Clinical Presentation Rationale: based on medical and personal factors listed in PT evaluation  Clinical Decision Making (Complexity): Moderate complexity    PLAN OF CARE  Treatment Interventions:  Modalities: Hot Pack  Interventions: Gait Training, Manual Therapy, Neuromuscular Re-education, Therapeutic Activity, Therapeutic Exercise, Self-Care/Home Management, Canalith Repositioning    Long Term Goals     PT Goal 1  Goal Identifier: STG 1  Goal Description: Patient will be independent with a short-term home exercise program.  Target Date: 04/02/25  PT Goal 2  Goal Identifier: STG 2  Goal Description: Patient will understand and demonstrate improved posture and techniques such that patient places less strain over spine and supporting musculature and aids in maintaining center of mass within base of support.  Target Date: 04/02/25  PT Goal 3  Goal Identifier: LTG 1  Goal Description: Improve score on utilized outcome measures to correlate with clinically significant change.  Target Date: 05/28/25  PT Goal 4  Goal Identifier: LTG 2  Goal Description: Patient will endorse confidence in ability to manage home exercise program independently to promote continued progression and management of back pain symptoms following discharge from PT services.  Target Date: 05/28/25      Frequency of Treatment: 1x/week tapered to discharge  Duration of Treatment: 12 weeks    Recommended Referrals to Other Professionals: Occupational Therapy  Education Assessment:   Learner/Method: Patient;Listening;Reading;Demonstration;Pictures/Video;No Barriers to Learning    Risks and benefits of evaluation/treatment have been explained.   Patient/Family/caregiver agrees with Plan of Care.     Evaluation Time:     PT Eval, Moderate Complexity Minutes (66235): 25        Signing Clinician: Amalia Villanueva, LOIS        [facility]                                                                                   OUTPATIENT PHYSICAL THERAPY      PLAN OF TREATMENT FOR OUTPATIENT REHABILITATION   Patient's Last Name, First Name, Lio Rose YOB: 1934   Provider's Name   [facility]   Medical Record No.  [id number]     Onset Date: 02/13/25  Start of Care Date: 03/05/25     Medical Diagnosis:  Balance problems (R26.89)      PT Treatment Diagnosis:  Balance problems (R26.89) Plan of Treatment  Frequency/Duration: 1x/week tapered to discharge/ 12 weeks    Certification date from 03/05/25 to 05/28/25         See note for plan of treatment details and functional goals     Amalia Villanueva, PT                         I CERTIFY THE NEED FOR THESE SERVICES FURNISHED UNDER        THIS PLAN OF TREATMENT AND WHILE UNDER MY CARE     (Physician attestation of this document indicates review and certification of the therapy plan).              Referring Provider:  Rubén Wren    Initial Assessment  See Epic Evaluation- Start of Care Date: 03/05/25

## 2025-03-11 DIAGNOSIS — E78.5 HYPERLIPIDEMIA LDL GOAL <130: ICD-10-CM

## 2025-03-11 DIAGNOSIS — I10 HTN, GOAL BELOW 140/80: ICD-10-CM

## 2025-03-11 RX ORDER — ATENOLOL 50 MG/1
TABLET ORAL
Qty: 90 TABLET | Refills: 3 | Status: SHIPPED | OUTPATIENT
Start: 2025-03-11

## 2025-03-11 RX ORDER — PRAVASTATIN SODIUM 40 MG
TABLET ORAL
Qty: 135 TABLET | Refills: 3 | Status: SHIPPED | OUTPATIENT
Start: 2025-03-11

## 2025-03-19 ENCOUNTER — THERAPY VISIT (OUTPATIENT)
Dept: PHYSICAL THERAPY | Facility: HOSPITAL | Age: OVER 89
End: 2025-03-19
Attending: FAMILY MEDICINE
Payer: MEDICARE

## 2025-03-19 DIAGNOSIS — R26.89 BALANCE PROBLEMS: Primary | ICD-10-CM

## 2025-03-19 PROCEDURE — 97112 NEUROMUSCULAR REEDUCATION: CPT | Mod: GP

## 2025-03-31 DIAGNOSIS — K21.9 GASTROESOPHAGEAL REFLUX DISEASE WITHOUT ESOPHAGITIS: ICD-10-CM

## 2025-04-01 RX ORDER — OMEPRAZOLE 20 MG/1
CAPSULE, DELAYED RELEASE ORAL
Qty: 90 CAPSULE | Refills: 3 | Status: SHIPPED | OUTPATIENT
Start: 2025-04-01

## 2025-04-29 ENCOUNTER — THERAPY VISIT (OUTPATIENT)
Dept: PHYSICAL THERAPY | Facility: HOSPITAL | Age: OVER 89
End: 2025-04-29
Attending: FAMILY MEDICINE
Payer: MEDICARE

## 2025-04-29 DIAGNOSIS — R26.89 BALANCE PROBLEMS: Primary | ICD-10-CM

## 2025-04-29 PROCEDURE — 97112 NEUROMUSCULAR REEDUCATION: CPT | Mod: GP,CQ

## 2025-05-06 ENCOUNTER — THERAPY VISIT (OUTPATIENT)
Dept: PHYSICAL THERAPY | Facility: HOSPITAL | Age: OVER 89
End: 2025-05-06
Attending: FAMILY MEDICINE
Payer: MEDICARE

## 2025-05-06 DIAGNOSIS — R26.89 BALANCE PROBLEMS: Primary | ICD-10-CM

## 2025-05-06 PROCEDURE — 97110 THERAPEUTIC EXERCISES: CPT | Mod: GP

## 2025-05-06 PROCEDURE — 97112 NEUROMUSCULAR REEDUCATION: CPT | Mod: GP

## 2025-05-13 ENCOUNTER — THERAPY VISIT (OUTPATIENT)
Dept: PHYSICAL THERAPY | Facility: HOSPITAL | Age: OVER 89
End: 2025-05-13
Attending: FAMILY MEDICINE
Payer: MEDICARE

## 2025-05-13 DIAGNOSIS — R26.89 BALANCE PROBLEMS: Primary | ICD-10-CM

## 2025-05-13 PROCEDURE — 97112 NEUROMUSCULAR REEDUCATION: CPT | Mod: GP

## 2025-05-13 PROCEDURE — 97110 THERAPEUTIC EXERCISES: CPT | Mod: GP

## 2025-05-20 ENCOUNTER — THERAPY VISIT (OUTPATIENT)
Dept: PHYSICAL THERAPY | Facility: HOSPITAL | Age: OVER 89
End: 2025-05-20
Attending: FAMILY MEDICINE
Payer: MEDICARE

## 2025-05-20 DIAGNOSIS — R26.89 BALANCE PROBLEMS: Primary | ICD-10-CM

## 2025-05-20 PROCEDURE — 97112 NEUROMUSCULAR REEDUCATION: CPT | Mod: GP,CQ

## 2025-05-20 PROCEDURE — 97110 THERAPEUTIC EXERCISES: CPT | Mod: GP,CQ

## 2025-05-27 ENCOUNTER — THERAPY VISIT (OUTPATIENT)
Dept: PHYSICAL THERAPY | Facility: HOSPITAL | Age: OVER 89
End: 2025-05-27
Attending: FAMILY MEDICINE
Payer: MEDICARE

## 2025-05-27 DIAGNOSIS — R26.89 BALANCE PROBLEMS: Primary | ICD-10-CM

## 2025-05-27 PROCEDURE — 97112 NEUROMUSCULAR REEDUCATION: CPT | Mod: GP

## 2025-05-27 PROCEDURE — 97530 THERAPEUTIC ACTIVITIES: CPT | Mod: GP

## 2025-05-27 NOTE — PROGRESS NOTES
05/27/25 0500   Appointment Info   Signing clinician's name / credentials Amalia Villanueva DPT (Tom)   Total/Authorized Visits 365   Visits Used 12   Medical Diagnosis Balance problems (R26.89)   PT Tx Diagnosis Balance problems (R26.89)   Quick Adds Certification   Progress Note/Certification   Start of Care Date 03/05/25   Onset of illness/injury or Date of Surgery 02/13/25   Therapy Frequency 1x/week tapered to discharge   Predicted Duration 12 weeks   Certification date from 05/27/25   Certification date to 08/19/25   Progress Note Completed Date 05/27/25   GOALS   PT Goals 5   PT Goal 1   Goal Identifier STG 1   Goal Description Patient will be independent with a short-term home exercise program.   Goal Progress Meeting   Target Date 04/02/25   Date Met 05/06/25   PT Goal 2   Goal Identifier STG 2   Goal Description Patient will understand and demonstrate improved posture and techniques such that patient places less strain over spine and supporting musculature and aids in maintaining center of mass within base of support.   Goal Progress Meeting   Target Date 04/02/25   Date Met 05/06/25   PT Goal 3   Goal Identifier LTG 1   Goal Description Improve score on utilized outcome measures to correlate with clinically significant change.   Goal Progress MET- 3MBWT significantly improved since evaluation   Target Date 05/28/25   Date Met 05/27/25   PT Goal 4   Goal Identifier LTG 2   Goal Description Patient will endorse confidence in ability to manage home exercise program independently to promote continued progression and management of back pain symptoms following discharge from PT services.   Goal Progress Progressing satisfactorily; 60% improved   Target Date 05/28/25   PT Goal 5   Goal Identifier LTG 3   Goal Description Patient will demonstrate ability to perform 10x sit to stand from standard height chair with SBA and no fall backs/insufficient initiation.   Rationale to maximize safety and independence within  the home   Target Date 08/19/25   Subjective Report   Subjective Report Pt arrives for continued treatment of balance concerns. Pt notes that he feels his balance is improving significantly Pt notes that he does not feel rearward balance concerns any longer and is more confident with his stepping; Pt notes a subjective improvement of 60% from baseline   Objective Measures   Objective Measures Objective Measure 2;Objective Measure 3   Objective Measure 1   Objective Measure 3MBWT   Details 11.3 second average per attempt (3 attempts, one near LoB with MIN-A support to correct)   Objective Measure 2   Objective Measure 10x sit<>stand   Details 72 seconds- >5x needing multiple initiations from 22in height chair   Treatment Interventions (PT)   Interventions Neuromuscular Re-education;Therapeutic Activity   Therapeutic Activity   Therapeutic Activities: dynamic activities to improve functional performance minutes (36968) 18   Ther Act 1 - Details 4 rounds of Nustep with feedback on maintaining consistent wattage and providing feedback on HR during rests; 4 rounds of 3 mins at level 4-6-6-8 maintaining [place] between 20-30 today   Skilled Intervention RPE correlation, energy conservation/delegation   Patient Response/Progress Good- endorses understanding; RPE at 4-5-6-8 today; HR at [id number]   Neuromuscular Re-education   Neuromuscular re-ed of mvmt, balance, coord, kinesthetic sense, posture, proprioception minutes (82220) 20   Neuro Re-ed 1 - Details Backwards walking x3 rounds of 3m with gait belt under guidance to do it as fast as safely possible (1x near LoB with MIN-A intervention); 3 rounds of 3mins on treadmill walking backwards at 0.6mph with cues to look forward and MOD UE support on railings; Pt intermittently tasked with finding objects and shapes in room   Skilled Intervention Balance and dual task training   Patient Response/Progress Fair, the pt required CGA-min assist with gait belt in place to safety  perform activites.  However improved reactive responce noted.   Plan   Home program Continue   Updates to plan of care Added focus of sit<>stand independence and control   Plan for next session Sit<>stand training; Leg press (if able to get onto machine safely); Sit<>stand from variable heights         [facility]                                                                                   OUTPATIENT PHYSICAL THERAPY    PLAN OF TREATMENT FOR OUTPATIENT REHABILITATION   Patient's Last Name, First Name, Lio Rose YOB: 1934   Provider's Name   [facility]   Medical Record No.  [id number]     Onset Date: 02/13/25  Start of Care Date: 03/05/25     Medical Diagnosis:  Balance problems (R26.89)      PT Treatment Diagnosis:  Balance problems (R26.89) Plan of Treatment  Frequency/Duration: 1x/week tapered to discharge/ 12 weeks    Certification date from (P) 05/27/25 to (P) 08/19/25         See note for plan of treatment details and functional goals     Amalia Villanueva, PT                         I CERTIFY THE NEED FOR THESE SERVICES FURNISHED UNDER        THIS PLAN OF TREATMENT AND WHILE UNDER MY CARE     (Physician attestation of this document indicates review and certification of the therapy plan).              Referring Provider:  Rubén Wren    Initial Assessment  See Epic Evaluation- Start of Care Date: 03/05/25

## 2025-06-04 ENCOUNTER — THERAPY VISIT (OUTPATIENT)
Dept: PHYSICAL THERAPY | Facility: HOSPITAL | Age: OVER 89
End: 2025-06-04
Attending: FAMILY MEDICINE
Payer: MEDICARE

## 2025-06-04 DIAGNOSIS — R26.89 BALANCE PROBLEMS: Primary | ICD-10-CM

## 2025-06-04 PROCEDURE — 97530 THERAPEUTIC ACTIVITIES: CPT | Mod: GP,CQ

## 2025-06-04 PROCEDURE — 97110 THERAPEUTIC EXERCISES: CPT | Mod: GP,CQ

## 2025-06-11 ENCOUNTER — THERAPY VISIT (OUTPATIENT)
Dept: PHYSICAL THERAPY | Facility: HOSPITAL | Age: OVER 89
End: 2025-06-11
Attending: FAMILY MEDICINE
Payer: MEDICARE

## 2025-06-11 DIAGNOSIS — R26.89 BALANCE PROBLEMS: Primary | ICD-10-CM

## 2025-06-11 PROCEDURE — 97110 THERAPEUTIC EXERCISES: CPT | Mod: GP,CQ

## 2025-06-16 DIAGNOSIS — E03.9 HYPOTHYROIDISM, UNSPECIFIED TYPE: ICD-10-CM

## 2025-06-16 DIAGNOSIS — N40.0 PROSTATISM: ICD-10-CM

## 2025-06-17 RX ORDER — LEVOTHYROXINE SODIUM 88 UG/1
88 TABLET ORAL DAILY
Qty: 90 TABLET | Refills: 1 | Status: SHIPPED | OUTPATIENT
Start: 2025-06-17

## 2025-06-17 RX ORDER — DUTASTERIDE 0.5 MG/1
1 CAPSULE, LIQUID FILLED ORAL DAILY
Qty: 90 CAPSULE | Refills: 0 | Status: SHIPPED | OUTPATIENT
Start: 2025-06-17

## 2025-06-17 NOTE — TELEPHONE ENCOUNTER
DUTASTERIDE 0.5 MG CAPSULE       Last Written Prescription Date:  12/30/24  Last Fill Quantity: 90,   # refills: 1  Last Office Visit: 2/13/25  Future Office visit:    Next 5 appointments (look out 90 days)      Aug 29, 2025 1:50 PM  (Arrive by 1:35 PM)  Return Visit with Radha Sales NP  [facility] ([facility] ) [address] [place] [id number]  [phone]             Routing refill request to provider for review/approval because:    BPH Agents [id number]/[id number]/2025 02:53 PM   Protocol Details Medication indicated for associated diagnosis

## 2025-08-23 DIAGNOSIS — F41.9 ANXIETY: ICD-10-CM

## 2025-08-25 RX ORDER — ALPRAZOLAM 0.5 MG
TABLET ORAL
Qty: 60 TABLET | Refills: 0 | Status: SHIPPED | OUTPATIENT
Start: 2025-08-25